# Patient Record
Sex: FEMALE | Race: WHITE | NOT HISPANIC OR LATINO | URBAN - METROPOLITAN AREA
[De-identification: names, ages, dates, MRNs, and addresses within clinical notes are randomized per-mention and may not be internally consistent; named-entity substitution may affect disease eponyms.]

---

## 2021-11-18 ENCOUNTER — INPATIENT (INPATIENT)
Facility: HOSPITAL | Age: 86
LOS: 3 days | Discharge: NURSING, REHAB OR LONG-TERM CARE | DRG: 603 | End: 2021-11-22
Attending: INTERNAL MEDICINE | Admitting: HOSPITALIST
Payer: MEDICARE

## 2021-11-18 VITALS
SYSTOLIC BLOOD PRESSURE: 109 MMHG | HEIGHT: 60 IN | WEIGHT: 89.95 LBS | TEMPERATURE: 98 F | DIASTOLIC BLOOD PRESSURE: 71 MMHG | OXYGEN SATURATION: 97 % | RESPIRATION RATE: 18 BRPM | HEART RATE: 91 BPM

## 2021-11-18 PROCEDURE — 99285 EMERGENCY DEPT VISIT HI MDM: CPT

## 2021-11-18 PROCEDURE — 72170 X-RAY EXAM OF PELVIS: CPT | Mod: 26

## 2021-11-18 PROCEDURE — 93010 ELECTROCARDIOGRAM REPORT: CPT

## 2021-11-18 PROCEDURE — 72100 X-RAY EXAM L-S SPINE 2/3 VWS: CPT | Mod: 26

## 2021-11-18 NOTE — ED PROVIDER NOTE - PHYSICAL EXAMINATION
Gen: NAD, thin lady  Neuro: AOx3  Head: atraumatic, normocephalic  Chest: CTAB, s1s2, RRR, no MRG. Outline of pacemaker present in upper L chest  Abdomen: soft, NTND, +BS  MSK: 4/5 strength in b/l legs, full ROM, able to move independently. Intact sensation.   Skin: no bruising noted

## 2021-11-18 NOTE — ED PROVIDER NOTE - PROGRESS NOTE DETAILS
I was called for evaluation for CDU for PT consult. I spoke with patient's emergency contact Katlyn Brumfiedl [phone]. She states that patient is alert and oriented to name and  and that she has "moderate dementia." States that sometimes that she can become confused.   Had a fall on Oct 21, 2021. Was hospitalized and sent to rehab, now residing in the [facility]. At that time states that patient hurt her R foot, for which she is now being treated with Doxycycline (per paperwork from rehab on day 2) for cellulitis.   I discussed conversation with ED team and reevaluated patient with team. Plan now for admission for PT evaluation and cellulitis. I then explained plan for admission for antibiotics and PT consult with Isiah- who is agreeable to plan.  - Amanda Carolina PA-C I was called for evaluation for CDU for PT consult. I spoke with patient's emergency contact Katlyn Brumfield [phone]. She states that patient is alert and oriented to name/ , and sometimes place and that she has "moderate dementia." States that sometimes that she can become confused.   Had a fall on Oct 21, 2021. Was hospitalized and sent to rehab, now residing in the [facility]. At that time states that patient hurt her R foot, for which she is now being treated with Doxycycline (per paperwork from rehab on day 2) for cellulitis.   I discussed conversation with ED team and reevaluated patient with team. Plan now for admission for PT evaluation and cellulitis. I then explained plan for admission for antibiotics and PT consult with Isiah- who is agreeable to plan.  - Amanda Carolina PA-C

## 2021-11-18 NOTE — ED PROVIDER NOTE - ATTENDING CONTRIBUTION TO CARE
89 y/o lady w/no known PMH presents from home after a fall this AM at [facility] onto her buttock from wheelchair, no head injury or loc, no signs of trauma or reproducible pain films to lspine and pelvis ordered, no head injury. pt also with r foot droop stating it is old to confirm with family, if so likely d.c back to nh with nl films. 89 y/o lady w/no known PMH presents from home after a fall this AM at nh onto her buttock from wheelchair, no head injury or loc, no signs of trauma or reproducible pain films to lspine and pelvis ordered, no head injury. pt also with r foot droop stating it is old to confirm with family. signed out pending work up.

## 2021-11-18 NOTE — ED PROVIDER NOTE - OBJECTIVE STATEMENT
89 y/o lady w/no known PMH presents from home after a fall this AM.     She was in her bathroom, trying to get from standing to her wheelchair/rocking chair, when she slipped and landed on her coccyx. She did not feel pain, was able to get back into her bed. She denies LOC or head strike during the fall. She came to the ED just to make sure "everything was ok". No loss of sensation/numbness/tingling in any of her extremities. No bowel or bladder incontinence.   She denies recent illness, fevers, chills, nausea/vomiting, chest pain, dyspnea, abdominal pain, headaches, diarrhea/constipation, dysuria.     12 point ROS otherwise negative.

## 2021-11-18 NOTE — ED PROVIDER NOTE - CLINICAL SUMMARY MEDICAL DECISION MAKING FREE TEXT BOX
89 y/o lady presents after a mechanical fall onto her coccyx. Will get XR pelvis and lumbar spine. She is not in pain, exam not concerning for cord compression. Will plan to DC if imaging is non-revealing. Case discussed w/Dr. Gomez. 89 y/o lady presents after a mechanical fall onto her coccyx. Will get XR pelvis and lumbar spine. She is not in pain, exam not concerning for cord compression. Will plan to DC if imaging is non-revealing. Case discussed w/Dr. Gomez.  Progress update 11/19 1253 hrs: Neuro evaluated patient and believe L foot drop is chronic, recommend admission for PT eval and possible AFO boot. Patient L foot shows open wound on dorsum (covered by dry bandage). Foot is warm. Obtained collateral from lizeth Brumfield, who stated that foot was injured back in October. Paperwork from [facility] shows Doxycycline since 11/17. Will escalate to Unasyn now given warmth and recent fall. Basic labs ordered, XR of L foot ordered. Will consult Podiatry and plan to admit to Medicine.

## 2021-11-19 DIAGNOSIS — F05 DELIRIUM DUE TO KNOWN PHYSIOLOGICAL CONDITION: ICD-10-CM

## 2021-11-19 DIAGNOSIS — L03.90 CELLULITIS, UNSPECIFIED: ICD-10-CM

## 2021-11-19 LAB
ALBUMIN SERPL ELPH-MCNC: 3.7 G/DL — SIGNIFICANT CHANGE UP (ref 3.3–5)
ALP SERPL-CCNC: 77 U/L — SIGNIFICANT CHANGE UP (ref 40–120)
ALT FLD-CCNC: 18 U/L — SIGNIFICANT CHANGE UP (ref 10–45)
ANION GAP SERPL CALC-SCNC: 13 MMOL/L — SIGNIFICANT CHANGE UP (ref 5–17)
AST SERPL-CCNC: 24 U/L — SIGNIFICANT CHANGE UP (ref 10–40)
BILIRUB SERPL-MCNC: 0.4 MG/DL — SIGNIFICANT CHANGE UP (ref 0.2–1.2)
BUN SERPL-MCNC: 16 MG/DL — SIGNIFICANT CHANGE UP (ref 7–23)
CALCIUM SERPL-MCNC: 9.1 MG/DL — SIGNIFICANT CHANGE UP (ref 8.4–10.5)
CHLORIDE SERPL-SCNC: 97 MMOL/L — SIGNIFICANT CHANGE UP (ref 96–108)
CO2 SERPL-SCNC: 23 MMOL/L — SIGNIFICANT CHANGE UP (ref 22–31)
CREAT SERPL-MCNC: 0.79 MG/DL — SIGNIFICANT CHANGE UP (ref 0.5–1.3)
CRP SERPL-MCNC: 46 MG/L — HIGH (ref 0–4)
ERYTHROCYTE [SEDIMENTATION RATE] IN BLOOD: 48 MM/HR — HIGH (ref 0–20)
GLUCOSE SERPL-MCNC: 93 MG/DL — SIGNIFICANT CHANGE UP (ref 70–99)
HCT VFR BLD CALC: 29.6 % — LOW (ref 34.5–45)
HGB BLD-MCNC: 9.6 G/DL — LOW (ref 11.5–15.5)
MCHC RBC-ENTMCNC: 27.4 PG — SIGNIFICANT CHANGE UP (ref 27–34)
MCHC RBC-ENTMCNC: 32.4 GM/DL — SIGNIFICANT CHANGE UP (ref 32–36)
MCV RBC AUTO: 84.3 FL — SIGNIFICANT CHANGE UP (ref 80–100)
NRBC # BLD: 0 /100 WBCS — SIGNIFICANT CHANGE UP (ref 0–0)
PLATELET # BLD AUTO: 326 K/UL — SIGNIFICANT CHANGE UP (ref 150–400)
POTASSIUM SERPL-MCNC: 4 MMOL/L — SIGNIFICANT CHANGE UP (ref 3.5–5.3)
POTASSIUM SERPL-SCNC: 4 MMOL/L — SIGNIFICANT CHANGE UP (ref 3.5–5.3)
PROT SERPL-MCNC: 6.6 G/DL — SIGNIFICANT CHANGE UP (ref 6–8.3)
RBC # BLD: 3.51 M/UL — LOW (ref 3.8–5.2)
RBC # FLD: 15.3 % — HIGH (ref 10.3–14.5)
SARS-COV-2 RNA SPEC QL NAA+PROBE: SIGNIFICANT CHANGE UP
SODIUM SERPL-SCNC: 133 MMOL/L — LOW (ref 135–145)
WBC # BLD: 8.4 K/UL — SIGNIFICANT CHANGE UP (ref 3.8–10.5)
WBC # FLD AUTO: 8.4 K/UL — SIGNIFICANT CHANGE UP (ref 3.8–10.5)

## 2021-11-19 PROCEDURE — 99223 1ST HOSP IP/OBS HIGH 75: CPT

## 2021-11-19 PROCEDURE — 99222 1ST HOSP IP/OBS MODERATE 55: CPT | Mod: GC

## 2021-11-19 PROCEDURE — 99222 1ST HOSP IP/OBS MODERATE 55: CPT

## 2021-11-19 PROCEDURE — 73630 X-RAY EXAM OF FOOT: CPT | Mod: 26,RT

## 2021-11-19 RX ORDER — MUPIROCIN 20 MG/G
1 OINTMENT TOPICAL
Refills: 0 | Status: DISCONTINUED | OUTPATIENT
Start: 2021-11-19 | End: 2021-11-22

## 2021-11-19 RX ORDER — BACITRACIN ZINC 500 UNIT/G
0 OINTMENT IN PACKET (EA) TOPICAL
Qty: 0 | Refills: 0 | DISCHARGE

## 2021-11-19 RX ORDER — AMPICILLIN SODIUM AND SULBACTAM SODIUM 250; 125 MG/ML; MG/ML
1.5 INJECTION, POWDER, FOR SUSPENSION INTRAMUSCULAR; INTRAVENOUS ONCE
Refills: 0 | Status: COMPLETED | OUTPATIENT
Start: 2021-11-19 | End: 2021-11-19

## 2021-11-19 RX ORDER — HALOPERIDOL DECANOATE 100 MG/ML
0.5 INJECTION INTRAMUSCULAR ONCE
Refills: 0 | Status: COMPLETED | OUTPATIENT
Start: 2021-11-19 | End: 2021-11-19

## 2021-11-19 RX ORDER — AMPICILLIN SODIUM AND SULBACTAM SODIUM 250; 125 MG/ML; MG/ML
1.5 INJECTION, POWDER, FOR SUSPENSION INTRAMUSCULAR; INTRAVENOUS EVERY 6 HOURS
Refills: 0 | Status: DISCONTINUED | OUTPATIENT
Start: 2021-11-19 | End: 2021-11-19

## 2021-11-19 RX ORDER — LATANOPROST 0.05 MG/ML
1 SOLUTION/ DROPS OPHTHALMIC; TOPICAL AT BEDTIME
Refills: 0 | Status: DISCONTINUED | OUTPATIENT
Start: 2021-11-19 | End: 2021-11-22

## 2021-11-19 RX ORDER — SOD,AMMONIUM,POTASSIUM LACTATE
0 CREAM (GRAM) TOPICAL
Qty: 0 | Refills: 0 | DISCHARGE

## 2021-11-19 RX ORDER — SODIUM CHLORIDE 9 MG/ML
1000 INJECTION INTRAMUSCULAR; INTRAVENOUS; SUBCUTANEOUS
Refills: 0 | Status: DISCONTINUED | OUTPATIENT
Start: 2021-11-19 | End: 2021-11-20

## 2021-11-19 RX ORDER — AMPICILLIN SODIUM AND SULBACTAM SODIUM 250; 125 MG/ML; MG/ML
INJECTION, POWDER, FOR SUSPENSION INTRAMUSCULAR; INTRAVENOUS
Refills: 0 | Status: DISCONTINUED | OUTPATIENT
Start: 2021-11-19 | End: 2021-11-19

## 2021-11-19 RX ORDER — HALOPERIDOL DECANOATE 100 MG/ML
0.5 INJECTION INTRAMUSCULAR ONCE
Refills: 0 | Status: DISCONTINUED | OUTPATIENT
Start: 2021-11-19 | End: 2021-11-19

## 2021-11-19 RX ORDER — CEFAZOLIN SODIUM 1 G
1000 VIAL (EA) INJECTION EVERY 8 HOURS
Refills: 0 | Status: DISCONTINUED | OUTPATIENT
Start: 2021-11-19 | End: 2021-11-19

## 2021-11-19 RX ORDER — ACETAMINOPHEN 500 MG
650 TABLET ORAL EVERY 6 HOURS
Refills: 0 | Status: DISCONTINUED | OUTPATIENT
Start: 2021-11-19 | End: 2021-11-21

## 2021-11-19 RX ORDER — LEVOTHYROXINE SODIUM 125 MCG
50 TABLET ORAL DAILY
Refills: 0 | Status: DISCONTINUED | OUTPATIENT
Start: 2021-11-19 | End: 2021-11-22

## 2021-11-19 RX ORDER — METOPROLOL TARTRATE 50 MG
25 TABLET ORAL DAILY
Refills: 0 | Status: DISCONTINUED | OUTPATIENT
Start: 2021-11-19 | End: 2021-11-22

## 2021-11-19 RX ORDER — COLLAGENASE CLOSTRIDIUM HIST. 250 UNIT/G
1 OINTMENT (GRAM) TOPICAL
Qty: 0 | Refills: 0 | DISCHARGE

## 2021-11-19 RX ORDER — HEPARIN SODIUM 5000 [USP'U]/ML
5000 INJECTION INTRAVENOUS; SUBCUTANEOUS EVERY 12 HOURS
Refills: 0 | Status: DISCONTINUED | OUTPATIENT
Start: 2021-11-19 | End: 2021-11-22

## 2021-11-19 RX ORDER — LANOLIN ALCOHOL/MO/W.PET/CERES
3 CREAM (GRAM) TOPICAL AT BEDTIME
Refills: 0 | Status: DISCONTINUED | OUTPATIENT
Start: 2021-11-19 | End: 2021-11-22

## 2021-11-19 RX ORDER — CEFAZOLIN SODIUM 1 G
1000 VIAL (EA) INJECTION EVERY 12 HOURS
Refills: 0 | Status: DISCONTINUED | OUTPATIENT
Start: 2021-11-19 | End: 2021-11-20

## 2021-11-19 RX ORDER — FAMOTIDINE 10 MG/ML
1 INJECTION INTRAVENOUS
Qty: 0 | Refills: 0 | DISCHARGE

## 2021-11-19 RX ADMIN — HEPARIN SODIUM 5000 UNIT(S): 5000 INJECTION INTRAVENOUS; SUBCUTANEOUS at 18:59

## 2021-11-19 RX ADMIN — SODIUM CHLORIDE 50 MILLILITER(S): 9 INJECTION INTRAMUSCULAR; INTRAVENOUS; SUBCUTANEOUS at 15:15

## 2021-11-19 RX ADMIN — HALOPERIDOL DECANOATE 0.5 MILLIGRAM(S): 100 INJECTION INTRAMUSCULAR at 09:36

## 2021-11-19 RX ADMIN — Medication 100 MILLIGRAM(S): at 19:00

## 2021-11-19 RX ADMIN — HALOPERIDOL DECANOATE 0.5 MILLIGRAM(S): 100 INJECTION INTRAMUSCULAR at 08:23

## 2021-11-19 RX ADMIN — LATANOPROST 1 DROP(S): 0.05 SOLUTION/ DROPS OPHTHALMIC; TOPICAL at 23:49

## 2021-11-19 RX ADMIN — AMPICILLIN SODIUM AND SULBACTAM SODIUM 100 GRAM(S): 250; 125 INJECTION, POWDER, FOR SUSPENSION INTRAMUSCULAR; INTRAVENOUS at 07:29

## 2021-11-19 NOTE — BEHAVIORAL HEALTH ASSESSMENT NOTE - OTHER
Unable to assess as above. Niece is a protective factor [facility] unable to assess. acute deterioration in function

## 2021-11-19 NOTE — ED ADULT NURSE NOTE - IS THE PATIENT ABLE TO BE SCREENED?
No If you are a smoker, it is important for your health to stop smoking. Please be aware that second hand smoke is also harmful.

## 2021-11-19 NOTE — DISCHARGE NOTE PROVIDER - CARE PROVIDER_API CALL
Wan Belcher)  Psychiatry  [address] [place] [address]  Phone: [phone]  Fax: [phone]  Established Patient  Follow Up Time:

## 2021-11-19 NOTE — BEHAVIORAL HEALTH ASSESSMENT NOTE - CASE SUMMARY
89F , living in [facility], with no formal PPHx, no h/o SA or psych admissions, no active substance abuse, with PMH significant for h/o PPM a/w fall, psychiatry consulted for management of agitation.  Pt presently calm, resting in stretcher, states we are "in the [place], it's so keren," gives her full name, disoriented to date/situation.  Denies SIIP/HIIP, denies AVH or paranoia.  Denies depressed mood or anxiety.  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  Recs: Haldol PRN as above provided QT<500, agree with fellow's assessment and plan as above.  CL psych will f/u.

## 2021-11-19 NOTE — H&P ADULT - NSHPPHYSICALEXAM_GEN_ALL_CORE
PHYSICAL EXAMINATION:  Vital Signs Last 24 Hrs  T(C): 36.7 (18 Nov 2021 20:33), Max: 36.7 (18 Nov 2021 20:33)  T(F): 98.1 (18 Nov 2021 20:33), Max: 98.1 (18 Nov 2021 20:33)  HR: 93 (19 Nov 2021 05:34) (91 - 93)  BP: 181/86 (19 Nov 2021 05:34) (109/71 - 181/86)  BP(mean): --  RR: 18 (19 Nov 2021 05:34) (18 - 18)  SpO2: 99% (19 Nov 2021 05:34) (97% - 99%)  CAPILLARY BLOOD GLUCOSE            GENERAL: NAD, well-groomed,  HEAD:  atraumatic, normocephalic  EYES: sclera anicteric  ENMT: mucous membranes moist  NECK: supple, No JVD  CHEST/LUNG: clear to auscultation bilaterally;    no      rales   ,   no rhonchi,   HEART: normal S1, S2  ABDOMEN: BS+, soft, ND, NT   EXTREMITIES:    no    edema    b/l LEs  NEURO: awake, ,     moves all extremities  right foot  drop, with mild  redness  SKIN: no     rash

## 2021-11-19 NOTE — BEHAVIORAL HEALTH ASSESSMENT NOTE - NSBHCONSULTRECOMMENDOTHER_PSY_A_CORE FT
Haldol 0.25 mg PO/IV/IM q 6hrs PRN severe agitation, Please monitor QTC < 500, patient may need a corrected Qtc measurement as she has a pacemaker (as reported by her HCP); cardiology input appreciated. Psychiatry will continue to monitor and follow.

## 2021-11-19 NOTE — H&P ADULT - ASSESSMENT
89 yo RH female     with no known PMHx      presents to the ED after a fall.     Per ED team, patient was in her bathroom, trying to sit down when she slipped and fell onto her coccyx. She was able to get off the floor and back into her bed. Denied coccyx pain, LOC, head strike. Patient came to the ED to "make sure everything was okay."    XR lumbar spine AP and lateral negative for acute fracture.   Neurology consulted for R foot drop. 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 Chronic, per patient.     * s/p fall, suspect from foor  drop  xray pelvis  and foot, no fx   no  syncope   pe r pt, foot  drop is  c/c, hence  per  neuro, no w/p needed   PT eval  *   redness  /  shallow ulcer, right foot, seen by podiatry  on iv ancef   on dvt ppx       ra       89 yo RH female     with no known PMHx      presents to the ED after a fall.     Per ED team, patient was in her bathroom, trying to sit down when she slipped and fell onto her coccyx. She was able to get off the floor and back into her bed. Denied coccyx pain, LOC, head strike. Patient came to the ED to "make sure everything was okay."    XR lumbar spine AP and lateral negative for acute fracture.   Neurology consulted for R foot drop. 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 Chronic, per patient.     * s/p fall, suspect from foot   drop  per  hcp, pt  has  had  mlple falls.  and foot ha s been  swollen since last fall 10/21/ 21, at  home  xray pelvis  and foot, no fx   no  syncope   per  pt, foot  drop is  c/c, hence  per  neuro, no w/p needed   PT eval  *   redness  /  shallow ulcer, right foot, seen by podiatry  on iv ancef   on dvt ppx     GOC   hcp Benjamin stanley  . pt is  a full code/  resides  at [facility]       89 yo RH female     with no known PMHx      presents to the ED after a fall.  h/o mlple  falls, dementia  with constatnt agitation pe r hcp     Per ED team, patient was in her bathroom, trying to sit down when she slipped and fell onto her coccyx. She was able to get off the floor and back into her bed. Denied coccyx pain, LOC, head strike. Patient came to the ED to "make sure everything was okay."    XR lumbar spine AP and lateral negative for acute fracture.   Neurology consulted for R foot drop. 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 Chronic, per patient.     * s/p fall, suspect from foot   drop  per  hcp, pt  has  had  mlple falls.  and foot ha s been  swollen since last fall 10/21/ 21, at  home  xray pelvis  and foot, no fx   no  syncope   per  pt, foot  drop is  c/c, hence  per  neuro, no w/p needed   PT eval  *   redness  /  shallow ulcer, right foot, seen by podiatry  on iv ancef  *  Dementia  with constant agitation per neice/ and is requesting psych eval   on dvt ppx     GOC   hcp Katlyn. niece  pt has no children  . pt is  a full code    resides  at [facility]       89 yo RH female     with no known PMHx      presents to the ED after a fall.  h/o mlple  falls, dementia  with constatnt agitation pe r hcp     Per ED team, patient was in her bathroom, trying to sit down when she slipped and fell onto her coccyx. She was able to get off the floor and back into her bed. Denied coccyx pain, LOC, head strike. Patient came to the ED to "make sure everything was okay."    XR lumbar spine AP and lateral negative for acute fracture.   Neurology consulted for R foot drop. 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 Chronic, per patient.     * s/p fall, suspect from foot   drop  per  hcp, pt  has  had  mlple falls.  and foot ha s been  swollen since last fall 10/21/ 21, at  home  xray pelvis  and foot, no fx   no  syncope   per  pt, foot  drop is  c/c, hence  per  neuro, no w/p needed   PT eval  *   had   redness per  podiatry, , right foot, seen by podiatry   minimal cellulitis, on iv ancef  for today per iD, switch to keflex  in am  *  Dementia  with constant agitation per neice/ and is requesting psych eval   on dvt ppx     GOC   hcp Katlyn. niece  pt has no children  . pt is  a full code    resides  at [facility]

## 2021-11-19 NOTE — BEHAVIORAL HEALTH ASSESSMENT NOTE - NSBHCONSULTMEDSEVERE_PSY_A_CORE FT
Haldol 0.25 mg PO/IV/IM q 6hrs PRN severe agitation, Please monitor QTC < 500, patient may need a corrected Qtc measurement as she has a pacemaker (as reported by her HCP) Haldol 0.25mg-0.5mg PO/IV/IM q 6hrs PRN severe agitation, Please monitor QTC < 500, patient may need a corrected Qtc measurement as she has a pacemaker (as reported by her HCP)

## 2021-11-19 NOTE — BEHAVIORAL HEALTH ASSESSMENT NOTE - NSBHCHARTREVIEWVS_PSY_A_CORE FT
ICU Vital Signs Last 24 Hrs  T(C): 36.4 (19 Nov 2021 10:21), Max: 36.7 (18 Nov 2021 20:33)  T(F): 97.6 (19 Nov 2021 10:21), Max: 98.1 (18 Nov 2021 20:33)  HR: 90 (19 Nov 2021 10:21) (90 - 93)  BP: 155/82 (19 Nov 2021 10:21) (109/71 - 181/86)  BP(mean): --  ABP: --  ABP(mean): --  RR: 19 (19 Nov 2021 10:21) (18 - 19)  SpO2: 99% (19 Nov 2021 10:21) (97% - 99%)

## 2021-11-19 NOTE — DISCHARGE NOTE PROVIDER - NSDCMRMEDTOKEN_GEN_ALL_CORE_FT
acetaminophen 500 mg oral tablet: 2 tab(s) orally 3 times a day, As Needed  Daily Melodie oral tablet: 1 tab(s) orally once a day  folic acid 1 mg oral tablet: 1 tab(s) orally once a day  levothyroxine 50 mcg (0.05 mg) oral tablet: 1 tab(s) orally once a day  multivitamin with minerals: 1 tab(s) orally once a day  travoprost 0.004% ophthalmic solution: 1 drop(s) to each affected eye once a day (in the evening)  Vitamin B1 100 mg oral tablet: 1 tab(s) orally once a day   acetaminophen 500 mg oral tablet: 2 tab(s) orally 3 times a day, As Needed  AFO  brace for the Right foot drop:   Daily Melodie oral tablet: 1 tab(s) orally once a day  folic acid 1 mg oral tablet: 1 tab(s) orally once a day  levothyroxine 50 mcg (0.05 mg) oral tablet: 1 tab(s) orally once a day  multivitamin with minerals: 1 tab(s) orally once a day  travoprost 0.004% ophthalmic solution: 1 drop(s) to each affected eye once a day (in the evening)  Vitamin B1 100 mg oral tablet: 1 tab(s) orally once a day   acetaminophen 500 mg oral tablet: 2 tab(s) orally 3 times a day, As Needed  AFO  brace for the Right foot drop:   Daily Melodie oral tablet: 1 tab(s) orally once a day  folic acid 1 mg oral tablet: 1 tab(s) orally once a day  haloperidol 0.5 mg oral tablet: 1 tab(s) orally once a day (at bedtime)  levothyroxine 50 mcg (0.05 mg) oral tablet: 1 tab(s) orally once a day  losartan 25 mg oral tablet: 1 tab(s) orally once a day  melatonin 3 mg oral tablet: 1 tab(s) orally once a day (at bedtime), As needed, Insomnia  metoprolol succinate 25 mg oral tablet, extended release: 1 tab(s) orally once a day  mupirocin 2% topical ointment: 1 application topically 2 times a day  Physical Therapy:   travoprost 0.004% ophthalmic solution: 1 drop(s) to each affected eye once a day (in the evening)  Vitamin B1 100 mg oral tablet: 1 tab(s) orally once a day

## 2021-11-19 NOTE — H&P ADULT - HISTORY OF PRESENT ILLNESS
HPI: 89 yo RH female     with no known PMHx     presents to the ED after a fall.     Per ED team, patient was in her bathroom, trying to sit down when she slipped and fell onto her coccyx. She was able to get off the floor and back into her bed. Denied coccyx pain, LOC, head strike.    Patient came to the ED to "make sure everything was okay."     XR lumbar spine AP and lateral negative for acute fracture.     Neurology consulted for R foot drop. At the time of neurology assessment, patient was woken from sleep in the early AM and was confused. She did not remember that she was in the hospital and asked repeatedly why she came here and if everything was okay.     Regarding the R foot drop, patient states this is a chronic issue that she has had for "years." States she never saw a doctor about it because she has been able to "deal with it" by being very careful when she walks.     She states she has a walker at home but rarely uses it, typically ambulating without assistive device.     She also notes the foot drop is associated with chronic RLE calf and shin "muscle pain."   Denies HA, dizziness, vision changes, numbness/tingling, new weakness.

## 2021-11-19 NOTE — BEHAVIORAL HEALTH ASSESSMENT NOTE - RISK ASSESSMENT
Low Acute Suicide Risk Risk factors: acute medical condition, decline in rational thinking ability   Protective factors: no current SIIP/HIIP, no h/o SA/SIB, no h/o psych admissions, social supports

## 2021-11-19 NOTE — BEHAVIORAL HEALTH ASSESSMENT NOTE - SUMMARY
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 Patient is AAOx 0 currently and notably a poor historian and continues to report she is cold and to talk to "son Katlyn." Patient vaguely recalls she had a fall but is uncooperative for the remainder of the assessment. Unable to assess if presence of hypomania/sully, PTSD, psychosis or OCD symptoms. Denies any SI/HI/AVH currently. 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 Patient is AAOx 0 currently and notably a poor historian and continues to report she is cold and to talk to "son Katlyn." Patient vaguely recalls she had a fall but is uncooperative for the remainder of the assessment. Denies any SI/HI/AVH currently.

## 2021-11-19 NOTE — ED ADULT NURSE REASSESSMENT NOTE - NS ED NURSE REASSESS COMMENT FT1
Pt. attempting to get out of bed and verbally aggressive with staff. Pt. placed on 1:1 for risk for elopement. Received report from Maurilio KIM. Pt. angry and attempting to get out of bed. Verbally aggressive with staff. Pt. placed on 1:1 for risk for elopement.

## 2021-11-19 NOTE — CONSULT NOTE ADULT - SUBJECTIVE AND OBJECTIVE BOX
Patient is a 89y old  Female who presents with a chief complaint of   From HPI" HPI:    HPI: 89 yo RH female     with no known PMHx     presents to the ED after a fall.     Per ED team, patient was in her bathroom, trying to sit down when she slipped and fell onto her coccyx. She was able to get off the floor and back into her bed. Denied coccyx pain, LOC, head strike.    Patient came to the ED to "make sure everything was okay."     XR lumbar spine AP and lateral negative for acute fracture.     Neurology consulted for R foot drop. At the time of neurology assessment, patient was woken from sleep in the early AM and was confused. She did not remember that she was in the hospital and asked repeatedly why she came here and if everything was okay.     Regarding the R foot drop, patient states this is a chronic issue that she has had for "years." States she never saw a doctor about it because she has been able to "deal with it" by being very careful when she walks.     She states she has a walker at home but rarely uses it, typically ambulating without assistive device.     She also notes the foot drop is associated with chronic RLE calf and shin "muscle pain."   Denies HA, dizziness, vision changes, numbness/tingling, new weakness.     (19 Nov 2021 09:21)  "    Above verified-agree with above unless noted below.    ID consulted     PAST MEDICAL & SURGICAL HISTORY:  No pertinent past medical history        Social history:   denies   Smoking,    ETOH,      IVDU       FAMILY HISTORY:  - Family history of medical problems in all first degree relatives reviewed.None of these were found to be related to patients current illness.    REVIEW OF SYSTEMS  pt not reliable historian  denies any complaints     General:	Denies any malaise fatigue or chills. Fevers absent    Skin:No rash  	  Ophthalmologic:Denies any visual complaints,discharge redness or photophobia  	  ENMT:No nasal discharge,headache,sinus congestion or throat pain.No dental complaints    Respiratory and Thorax:No cough,sputum or chest pain.Denies shortness of breath  	  Cardiovascular:	No chest pain,palpitaions or dizziness    Gastrointestinal:	NO nausea,abdominal pain or diarrhea.    Genitourinary:	No dysuria,frequency. No flank pain    Musculoskeletal:	No joint swelling or pain.No weakness    Neurological:No confusion,diziness.No extremity weakness.No bladder or bowel incontinence	      Allergic/Immunologic:	No hives or rash   Allergies    No Known Allergies    Intolerances        Antimicrobials:    ceFAZolin   IVPB 1000 milliGRAM(s) IV Intermittent every 12 hours      Prior Antimicrobials:  MEDICATIONS  (STANDING):    ampicillin/sulbactam  IVPB   100 mL/Hr IV Intermittent (11-19-21 @ 07:29)      Other medications reviewed      Vital Signs Last 24 Hrs  T(C): 36.4 (19 Nov 2021 10:21), Max: 36.7 (18 Nov 2021 20:33)  T(F): 97.6 (19 Nov 2021 10:21), Max: 98.1 (18 Nov 2021 20:33)  HR: 90 (19 Nov 2021 10:21) (90 - 93)  BP: 155/82 (19 Nov 2021 10:21) (109/71 - 181/86)  BP(mean): --  RR: 19 (19 Nov 2021 10:21) (18 - 19)  SpO2: 99% (19 Nov 2021 10:21) (97% - 99%)    PHYSICAL EXAM:Pleasant patient in no acute distress.      Constitutional:Comfortable.Awake and alert  No cachexia     Eyes:PERRL EOMI.NO discharge or conjunctival injection    ENMT:No sinus tenderness.No thrush.No pharyngeal exudate or erythema.Fair dental hygiene    Neck:Supple,No LN,no JVD      Respiratory:Good air entry bilaterally,CTA    Cardiovascular:S1 S2 wnl, ESM no rub or gallops    Gastrointestinal:Soft BS(+) no tenderness no masses ,No rebound or guarding    Genitourinary:No CVA tendereness         Extremities:R foot dorsal surface small ulcer- no discharge malodor or purulence  No surrounding erythema - minimals welling     Vascular:peripheral pulses felt    Neurological:Alert awake but confused         Musculoskeletal:No joint swelling or LOM              Labs:                            9.6    8.40  )-----------( 326      ( 19 Nov 2021 05:46 )             29.6     WBC Count: 8.40 (11-19-21 @ 05:46)      11-19    133<L>  |  97  |  16  ----------------------------<  93  4.0   |  23  |  0.79    Ca    9.1      19 Nov 2021 05:46    TPro  6.6  /  Alb  3.7  /  TBili  0.4  /  DBili  x   /  AST  24  /  ALT  18  /  AlkPhos  77  11-19        < from: Xray Foot AP + Lateral + Oblique, Right (11.19.21 @ 05:24) >    IMPRESSION:    Diffuse osteopenia. No acute fracture or dislocation. No cortical erosions or gas tracking.    MRI would provide a more sensitive evaluation, if clinically warranted.    --- End of Report ---            < end of copied text >            Imaging studies(films) were independently reviewed.Findings as detailed in report above   
Podiatry pager #: [phone]/ [phone]    Patient is a 89y old  Female who presents with a chief complaint of RF erythema    HPI:    She was in her bathroom, trying to get from standing to her wheelchair/rocking chair, when she slipped and landed on her coccyx. She did not feel pain, was able to get back into her bed. She denies LOC or head strike during the fall. She came to the ED just to make sure "everything was ok". No loss of sensation/numbness/tingling in any of her extremities. No bowel or bladder incontinence.   	She denies recent illness, fevers, chills, nausea/vomiting, chest pain, dyspnea, abdominal pain, headaches, diarrhea/constipation, dysuria.     	12 point ROS otherwise negative.  PAST MEDICAL & SURGICAL HISTORY:      MEDICATIONS  (STANDING):  ampicillin/sulbactam  IVPB      ampicillin/sulbactam  IVPB 1.5 Gram(s) IV Intermittent once  ampicillin/sulbactam  IVPB 1.5 Gram(s) IV Intermittent every 6 hours    MEDICATIONS  (PRN):      Allergies    No Known Allergies    Intolerances        VITALS:    Vital Signs Last 24 Hrs  T(C): 36.7 (18 Nov 2021 20:33), Max: 36.7 (18 Nov 2021 20:33)  T(F): 98.1 (18 Nov 2021 20:33), Max: 98.1 (18 Nov 2021 20:33)  HR: 93 (19 Nov 2021 05:34) (91 - 93)  BP: 181/86 (19 Nov 2021 05:34) (109/71 - 181/86)  BP(mean): --  RR: 18 (19 Nov 2021 05:34) (18 - 18)  SpO2: 99% (19 Nov 2021 05:34) (97% - 99%)    LABS:                          9.6    8.40  )-----------( 326      ( 19 Nov 2021 05:46 )             29.6       11-19    133<L>  |  97  |  16  ----------------------------<  93  4.0   |  23  |  0.79    Ca    9.1      19 Nov 2021 05:46    TPro  6.6  /  Alb  3.7  /  TBili  0.4  /  DBili  x   /  AST  24  /  ALT  18  /  AlkPhos  77  11-19      CAPILLARY BLOOD GLUCOSE              LOWER EXTREMITY PHYSICAL EXAM:    Vasular: DP/PT 1/4, B/L, CFT <3 seconds B/L, Temperature gradient WNL, B/L.   Neuro: Epicritic sensation diminished to the level of digits, B/L.  Musculoskeletal/Ortho: unremarkable    Skin: Right foot erythema to the ankle, wound to subQ on dorsum of the foot w/ fibrotic wound base, does not probe beyond subQ, no malodor, no drainage      RADIOLOGY & ADDITIONAL STUDIES:    
CHIEF COMPLAINT:Patient is a 89y old  Female who presents with a chief complaint of     HPI:  89 yo RH female with no known PMHxpresents to the ED after a fall.     Per ED team, patient was in her bathroom, trying to sit down when she slipped and fell onto her coccyx. She was able to get off the floor and back into her bed. Denied coccyx pain, LOC, head strike.    Patient came to the ED to "make sure everything was okay."     XR lumbar spine AP and lateral negative for acute fracture.     Neurology consulted for R foot drop. At the time of neurology assessment, patient was woken from sleep in the early AM and was confused. She did not remember that she was in the hospital and asked repeatedly why she came here and if everything was okay.     Regarding the R foot drop, patient states this is a chronic issue that she has had for "years." States she never saw a doctor about it because she has been able to "deal with it" by being very careful when she walks.     She states she has a walker at home but rarely uses it, typically ambulating without assistive device.     She also notes the foot drop is associated with chronic RLE calf and shin "muscle pain."   Denies HA, dizziness, vision changes, numbness/tingling, new weakness.        PAST MEDICAL & SURGICAL HISTORY:  No pertinent past medical history        MEDICATIONS  (STANDING):  ceFAZolin   IVPB 1000 milliGRAM(s) IV Intermittent every 12 hours  heparin   Injectable 5000 Unit(s) SubCutaneous every 12 hours  latanoprost 0.005% Ophthalmic Solution 1 Drop(s) Both EYES at bedtime  levothyroxine 50 MICROGram(s) Oral daily  mupirocin 2% Ointment 1 Application(s) Topical two times a day  sodium chloride 0.9%. 1000 milliLiter(s) (50 mL/Hr) IV Continuous <Continuous>    MEDICATIONS  (PRN):      FAMILY HISTORY:      SOCIAL HISTORY:    [x] Non-smoker  [ ] Smoker  [ ] Alcohol    Allergies    No Known Allergies    Intolerances    	    REVIEW OF SYSTEMS:  CONSTITUTIONAL: No fever, weight loss, or fatigue  EYES: No eye pain, visual disturbances, or discharge  ENT:  No difficulty hearing, tinnitus, vertigo; No sinus or throat pain  NECK: No pain or stiffness  RESPIRATORY: No cough, wheezing, chills or hemoptysis; No Shortness of Breath  CARDIOVASCULAR: No chest pain, palpitations, passing out, dizziness, or leg swelling  GASTROINTESTINAL: No abdominal or epigastric pain. No nausea, vomiting, or hematemesis; No diarrhea or constipation. No melena or hematochezia.  GENITOURINARY: No dysuria, frequency, hematuria, or incontinence  NEUROLOGICAL: No headaches, memory loss, loss of strength, numbness, or tremors  SKIN: No itching, burning, rashes, or lesions   LYMPH Nodes: No enlarged glands  ENDOCRINE: No heat or cold intolerance; No hair loss  MUSCULOSKELETAL: No joint pain or swelling; No muscle, back, or extremity pain  PSYCHIATRIC: No depression, anxiety, mood swings, or difficulty sleeping  HEME/LYMPH: No easy bruising, or bleeding gums  ALLERGY AND IMMUNOLOGIC: No hives or eczema	    [ ] All others negative	  [x ] Unable to obtain    PHYSICAL EXAM:  T(C): 36.9 (11-19-21 @ 16:29), Max: 36.9 (11-19-21 @ 16:29)  HR: 81 (11-19-21 @ 16:29) (81 - 93)  BP: 170/89 (11-19-21 @ 16:29) (109/71 - 181/86)  RR: 17 (11-19-21 @ 16:29) (17 - 19)  SpO2: 97% (11-19-21 @ 16:29) (97% - 99%)  Wt(kg): --  I&O's Summary      Appearance: Normal	  HEENT:   Normal oral mucosa, PERRL, EOMI	  Lymphatic: No lymphadenopathy  Cardiovascular: Normal S1 S2, No JVD, + murmurs, No edema  Respiratory: rghonchi  Gastrointestinal:  Soft, Non-tender, + BS	  Skin: No rashes, No ecchymoses, No cyanosis	  Neurologic: Non-focal  Extremities: Normal range of motion, No clubbing, cyanosis or edema  Vascular: decrease pulses    TELEMETRY: 	    ECG:  	  RADIOLOGY:  OTHER: 	  	  LABS:	 	    CARDIAC MARKERS:                              9.6    8.40  )-----------( 326      ( 19 Nov 2021 05:46 )             29.6     11-19    133<L>  |  97  |  16  ----------------------------<  93  4.0   |  23  |  0.79    Ca    9.1      19 Nov 2021 05:46    TPro  6.6  /  Alb  3.7  /  TBili  0.4  /  DBili  x   /  AST  24  /  ALT  18  /  AlkPhos  77  11-19    proBNP:   Lipid Profile:   HgA1c:   TSH:       PREVIOUS DIAGNOSTIC TESTING:      < from: Xray Foot AP + Lateral + Oblique, Right (11.19.21 @ 05:24) >  Diffuse osteopenia. No acute fracture or dislocation. No cortical erosions or gas tracking.    MRI would provide a more sensitive evaluation, if clinically warranted.    Pt is 89yo who presents w/ RF cellulitis and wound to subQ  -pt seen and examined  -Afebrile, no leukocytosis  -Right foot erythema to the ankle, wound to subQ on dorsum of the foot w/ fibrotic wound base, does not probe beyond subQ, no malodor, no drainage  -wound culture not obtained as it would yield contaminant  -Recommend IV antibiotics  -Pod plan for local wound care      
JUSTINE OROURKE  Female  MRN-[id number]    HPI: 89 yo RH female with no known PMHx presents to the ED after a fall. Per ED team, patient was in her bathroom, trying to sit down when she slipped and fell onto her coccyx. She was able to get off the floor and back into her bed. Denied coccyx pain, LOC, head strike. Patient came to the ED to "make sure everything was okay." XR lumbar spine AP and lateral negative for acute fracture. Neurology consulted for R foot drop. At the time of neurology assessment, patient was woken from sleep in the early AM and was confused. She did not remember that she was in the hospital and asked repeatedly why she came here and if everything was okay. Regarding the R foot drop, patient states this is a chronic issue that she has had for "years." States she never saw a doctor about it because she has been able to "deal with it" by being very careful when she walks. She states she has a walker at home but rarely uses it, typically ambulating without assistive device. She also notes the foot drop is associated with chronic RLE calf and shin "muscle pain." Denies HA, dizziness, vision changes, numbness/tingling, new weakness.    ROS: All negative except as mentioned in HPI.    PAST MEDICAL & SURGICAL HISTORY:  No known Past Medical History    Allergies    No Known Allergies    Vital Signs Last 24 Hrs  T(C): 36.7 (18 Nov 2021 20:33), Max: 36.7 (18 Nov 2021 20:33)  T(F): 98.1 (18 Nov 2021 20:33), Max: 98.1 (18 Nov 2021 20:33)  HR: 91 (18 Nov 2021 20:33) (91 - 91)  BP: 109/71 (18 Nov 2021 20:33) (109/71 - 109/71)  RR: 18 (18 Nov 2021 20:33) (18 - 18)  SpO2: 97% (18 Nov 2021 20:33) (97% - 97%)    General Exam:  Constitutional: Lying on stretcher, NAD.  Head: Normocephalic, atraumatic.  Extremities: No edema.    Neuro Exam:   MS: Sleeping. Awakens to voice. Oriented to self, age, month, and year. Unable to state location. Speech is fluent, not slurred. Able to name objects and repeat. Follows commands.  CN: PERRL. VFF. EOMI. V1-V3 intact. Face symmetric. Hearing decreased bilaterally to normal conversation. Tongue midline.   Motor:            Deltoid	Biceps	Triceps	   	R	5	5	5	5	 	  	L	5	5	5	5	    		H-Flex	K-Flex	K-Ext	Ankle In   Ankle Ev   D-Flex   P-Flex  	R	5	5	5	5	  3	      0	      4	 	   	L	5	5	5	5	  5	      5	      5	    Sensory: Intact to LT throughout. No extinction.  Reflexes: 2+ throughout bilateral upper extremities. 2+ bilateral Patellars, 1+ bilateral Achilles. Babinski absent bilaterally.   Coordination: No dysmetria on FNF or on HTS bilaterally.  Gait: Patient did not want to try to walk at time of exam.    RADIOLOGY:    -11/18 XR Lumbar Spine AP + Lateral (Preliminary Read): No acute fracture.

## 2021-11-19 NOTE — BEHAVIORAL HEALTH ASSESSMENT NOTE - NSBHCHARTREVIEWIMAGING_PSY_A_CORE FT
INTERPRETATION:  CLINICAL INFORMATION: Cellulitis. Evaluate for osteomyelitis and/or fracture.    TECHNIQUE: 3-views of the right foot    COMPARISON: None    FINDINGS: There is no acute fracture or dislocation. There are scattered mild midfoot degenerative changes. The soft tissues are unremarkable. No cortical erosions or gas tracking. There is diffuse osteopenia. Vascular atherosclerotic calcifications are present.    IMPRESSION:    Diffuse osteopenia. No acute fracture or dislocation. No cortical erosions or gas tracking.    MRI would provide a more sensitive evaluation, if clinically warranted.

## 2021-11-19 NOTE — DISCHARGE NOTE PROVIDER - NSDCFUADDINST_GEN_ALL_CORE_FT
Podiatry Discharge Instructions:  Follow up: Please follow up with Dr. Villegas within 1 week of discharge from the hospital, please call [phone] for appointment and discuss that you recently were seen in the hospital.  Wound Care: Please apply mupirocin to RF wound daily followed by 4x4 guaze and juvencio.   Weight bearing: Please weight bear as tolerated   Antibiotics: Please continue as instructed.

## 2021-11-19 NOTE — BEHAVIORAL HEALTH ASSESSMENT NOTE - NSBHCHARTREVIEWLAB_PSY_A_CORE FT
9.6    8.40  )-----------( 326      ( 19 Nov 2021 05:46 )             29.6     11-19    133<L>  |  97  |  16  ----------------------------<  93  4.0   |  23  |  0.79    Ca    9.1      19 Nov 2021 05:46    TPro  6.6  /  Alb  3.7  /  TBili  0.4  /  DBili  x   /  AST  24  /  ALT  18  /  AlkPhos  77  11-19

## 2021-11-19 NOTE — H&P ADULT - NSHPLABSRESULTS_GEN_ALL_CORE
LABS:                        9.6    8.40  )-----------( 326      ( 19 Nov 2021 05:46 )             29.6     11-19    133<L>  |  97  |  16  ----------------------------<  93  4.0   |  23  |  0.79    Ca    9.1      19 Nov 2021 05:46    TPro  6.6  /  Alb  3.7  /  TBili  0.4  /  DBili  x   /  AST  24  /  ALT  18  /  AlkPhos  77  11-19

## 2021-11-19 NOTE — DISCHARGE NOTE PROVIDER - NSDCCPCAREPLAN_GEN_ALL_CORE_FT
PRINCIPAL DISCHARGE DIAGNOSIS  Diagnosis: Cellulitis  Assessment and Plan of Treatment: Right Foot ulcer  Seen by Infectious disease and Podiatry  Complete course or oral antibiotic therapy  Continue with local wound care to foot ulcer:   Aply mupirocin to right foot wound daily, and dress with sterile 4x4 gauze and juvencio.      SECONDARY DISCHARGE DIAGNOSES  Diagnosis: Delirium due to another medical condition  Assessment and Plan of Treatment: Supportive care    Diagnosis: Right foot drop  Assessment and Plan of Treatment: Received right AFO to control the drop-foot condition.  A Darco shoe was incorporated with the brace ,and 2 socks.  Physical therapy for increase mobility and strengthening     PRINCIPAL DISCHARGE DIAGNOSIS  Diagnosis: Cellulitis  Assessment and Plan of Treatment: Right Foot ulcer  Seen by Infectious disease and Podiatry  Completed course or oral antibiotic therapy  Continue with local wound care to foot ulcer:   Aply mupirocin to right foot wound daily, and dress with sterile 4x4 gauze and juvencio.      SECONDARY DISCHARGE DIAGNOSES  Diagnosis: Delirium due to another medical condition  Assessment and Plan of Treatment: Supportive care    Diagnosis: Right foot drop  Assessment and Plan of Treatment: Received right AFO to control the drop-foot condition.  A Darco shoe was incorporated with the brace ,and 2 socks.  Physical therapy for increase mobility and strengthening

## 2021-11-19 NOTE — BEHAVIORAL HEALTH ASSESSMENT NOTE - HPI (INCLUDE ILLNESS QUALITY, SEVERITY, DURATION, TIMING, CONTEXT, MODIFYING FACTORS, ASSOCIATED SIGNS AND SYMPTOMS)
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 Patient is AAOx 0 currently and notably a poor historian and continues to report she is cold and to talk to "son Katlyn." Patient vaguely recalls she had a fall but is uncooperative for the remainder of the assessment. Unable to assess if presence of hypomania/sully, PTSD, psychosis or OCD symptoms. Denies any SI/HI/AVH currently.    Spoke with Patient's Niece ( Brother's daughter) Katlyn who identifies herself as HCP:    Per niece, patient was fairly high functioning prior to the last three years when she started to experience some cognitive decline. Patient has been noncompliant with medication such as synthroid and also has had gait imbalance. Patient had one additional fall in October 2021 and was hospitalized in [place]. She was admitted to [facility] assisted living a couple weeks ago but has her own one bedroom apartment which niece states appeared to be inhabitable. Notes patient has been incontinent of stool in the interim and has not been doing well ADL/IADL wise. She has been handling patient's financial. No history of psychiatric admissions or parasuicidal gestures reported. No recreational substance use reported.

## 2021-11-19 NOTE — CONSULT NOTE ADULT - ATTENDING COMMENTS
I have examined the pt at bedside.  The risks and the benefits of the proposed diagnostic/therapeutic approach were thoroughly discussed.   I agree with the above plan and have modified it where necessary.    89yo woman admitted to ER for a recent accidental fall.  She was found to have R foot ulcer, with possible cellulitis. Started on ABX.  We were consulted for foot drop, which by pt's admission is old, years old.    Pt partially oriented in the ER, but fully cooperative.   No focal signs, save for inability to flex or extend R foot. Sensation maintained.    Exam points to a possible R common peroneal damage. Pt needs EMG/NCV, unavailable in [facility].  Will refer to outpt neurology for evaluation.    Pt given Haldol in ER, please refrain from giving antipsychotics, rather re-orient and if needed, use low dose Seroquel or Olanzapine, if not contraindicated.

## 2021-11-19 NOTE — CONSULT NOTE ADULT - ASSESSMENT
89 yo RH female with no known PMHxpresents to the ED after a fall.     Per ED team, patient was in her bathroom, trying to sit down when she slipped and fell onto her coccyx. She was able to get off the floor and back into her bed. Denied coccyx pain, LOC, head strike.    Patient came to the ED to "make sure everything was okay."     XR lumbar spine AP and lateral negative for acute fracture.     Neurology consulted for R foot drop. At the time of neurology assessment, patient was woken from sleep in the early AM and was confused. She did not remember that she was in the hospital and asked repeatedly why she came here and if everything was okay.     Regarding the R foot drop, patient states this is a chronic issue that she has had for "years." States she never saw a doctor about it because she has been able to "deal with it" by being very careful when she walks.     She states she has a walker at home but rarely uses it, typically ambulating without assistive device.     She also notes the foot drop is associated with chronic RLE calf and shin "muscle pain."   Denies HA, dizziness, vision changes, numbness/tingling, new weakness.  unknown fall or syncope  suspect orthostatic hypotension  check orthostatic  add metoprolol er 25 mg daily  foot ulcer, iv abx ?PVD may consider LE arterial doppler  lipid pane  tsh/ b12 level  awaiting ECG  
Pt is 89yo who presents w/ RF cellulitis and wound to subQ  -pt seen and examined  -Afebrile, no leukocytosis  -Right foot erythema to the ankle, wound to subQ on dorsum of the foot w/ fibrotic wound base, does not probe beyond subQ, no malodor, no drainage  -wound culture not obtained as it would yield contaminant  -Recommend IV antibiotics  -Pod plan for local wound care  -Will follow
Assessment: 89 yo RH female with no known PMHx presents to the ED after a fall. Per ED team, patient was in her bathroom, trying to sit down when she slipped and fell onto her coccyx. She was able to get off the floor and back into her bed. Denied coccyx pain, LOC, head strike. Patient came to the ED to "make sure everything was okay." XR lumbar spine AP and lateral negative for acute fracture. Neurology consulted for R foot drop. 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 Chronic, per patient.    Recommendations:  [] Would send CBC, CMP, Magnesium, Phosphorus, and CPK.  [] Confirm chronicity of R foot drop. If it is confirmed to be a chronic problem, then would not pursue imaging.  [] Patient should be fitted with a AFO for the R foot.  [] PT/OT evals.  [] Rest of care per ED.    Case to be discussed with neurology attending Dr. Lorenzo.
89 yo woman with ?dementia,pleasantly confused  s/p fall at home  R foot ulcer- not deep  No/minimal  s/s cellulitis on my exam -discussed with podiatry -there was erythema and swelling per podiatry earlier  no s/s systemic dissemination  Rec:  A) Foot ulcer  ? cellulitis  Monitor clinically  continue IV ancef for now -can change to po keflex soon  short course   wound care per podiatry    B) falls  will defer to primary team     Will tailor plan for ID issues  per course,results.Will defer to primary team on management of other issues.  Assessment, plan and recommendations as detailed above were discussed with the medical/primary  team-Dr martinez.  Infectious Diseases Service will cover over weekend.  Please call [phone] if issues

## 2021-11-19 NOTE — ED ADULT NURSE NOTE - ED STAT RN HANDOFF WHERE
Problem: Mobility Impaired (Adult and Pediatric) Goal: *Acute Goals and Plan of Care (Insert Text) Description LTG: 
(1.)Mr. Marroquin will move from supine to sit and sit to supine  in bed with SUPERVISION within 7 treatment day(s). (2.)Mr. Marroquin will transfer from bed to chair and chair to bed with SUPERVISION using the least restrictive device within 7 treatment day(s). (3.)Mr. Marroquin will ambulate with SUPERVISION for 650 feet with the least restrictive device within 7 treatment day(s). (4)Mr. Marroquin will perform HEP with cues and assistance to increase safety on her feet in 7 days. (5)Mr. Marroquin will go up a flight of steps CGA with rail in 7 days. ________________________________________________________________________________________________ Outcome: Progressing Towards Goal 
  
PHYSICAL THERAPY: Initial Assessment and AM 4/9/2019 INPATIENT: PT Visit Days : 1 [place] / [place] / Product Type: Medicare /   
  
NAME/AGE/GENDER: Eugene Hernandez is a 76 y.o. male PRIMARY DIAGNOSIS: Small bowel obstruction ([address] [K56.609] Small bowel obstruction ([facility] [address] Small bowel obstruction ([facility] [address] Procedure(s) (LRB): 
EXPLORATORY LAPAROTOMY, LYSIS OF ADHESIONS, SMALL BOWEL RESECTION X1, ENTEROTOMY REPAIR X3, REPAIR OF CYSTOTOMY (N/A) 6 Days Post-Op ICD-10: Treatment Diagnosis:  
 Generalized Muscle Weakness (M62.81) Other abnormalities of gait and mobility (R26.89) Precaution/Allergies: 
Patient has no known allergies. ASSESSMENT:  
 
Mr. Óscar Radford presents with general weakness and decreased functional mobility s/p EXPLORATORY LAPAROTOMY, LYSIS OF ADHESIONS, SMALL BOWEL RESECTION X1, ENTEROTOMY REPAIR X3, REPAIR OF CYSTOTOMY on 4/3/19. He lives alone and was independent prior to hospitalization. He plans to go home. He ambulated in the [facility] CGA/min assist and used IV pole for an assistive device. He is generally weak with some unsteadiness on his feet.   Will follow. This section established at most recent assessment PROBLEM LIST (Impairments causing functional limitations): 
Decreased Strength Decreased ADL/Functional Activities Decreased Transfer Abilities Decreased Ambulation Ability/Technique Decreased Balance Decreased Activity Tolerance Increased Fatigue Increased Shortness of Breath Decreased Flexibility/Joint Mobility Decreased Graham with Home Exercise Program 
 INTERVENTIONS PLANNED: (Benefits and precautions of physical therapy have been discussed with the patient.) Balance Exercise Bed Mobility Family Education Gait Training Home Exercise Program (HEP) Range of Motion (ROM) Therapeutic Activites Therapeutic Exercise/Strengthening Transfer Training TREATMENT PLAN: Frequency/Duration: daily for duration of hospital stay Rehabilitation Potential For Stated Goals: Good RECOMMENDED REHABILITATION/EQUIPMENT: (at time of discharge pending progress): Due to the probability of continued deficits (see above) this patient will likely need continued skilled physical therapy after discharge. Equipment:  
None at this time HISTORY:  
History of Present Injury/Illness (Reason for Referral): EXPLORATORY LAPAROTOMY, LYSIS OF ADHESIONS, SMALL BOWEL RESECTION X1, ENTEROTOMY REPAIR X3, REPAIR OF CYSTOTOMY on 4/9/19. Past Medical History/Comorbidities:  
Mr. Demetri Coe  has a past medical history of Family history of malignant neoplasm of gastrointestinal tract, Fecal incontinence, Former cigarette smoker, History of rectal cancer, Hypothyroid, Infection and inflammatory reaction due to other internal prosthetic devices, implants and grafts, subsequent encounter (2017), Insomnia, Osteoarthritis, hand, Personal history of colonic polyps (9/2013), Personal history of malignant neoplasm of rectum, rectosigmoid junction, and anus (9/2013), and Psychiatric disorder.  He also has no past medical history of Adverse effect of anesthesia, Aneurysm ([address] Arrhythmia, Asthma, Autoimmune disease ([address] CAD (coronary artery disease), Chronic kidney disease, Chronic obstructive pulmonary disease ([address] Chronic pain, Coagulation disorder ([address] Diabetes ([address] Difficult intubation, Endocarditis, GERD (gastroesophageal reflux disease), Heart failure ([address] Hypertension, Ill-defined condition, Liver disease, Malignant hyperthermia due to anesthesia, Morbid obesity ([address] Nausea & vomiting, Other ill-defined conditions(799.89), Pseudocholinesterase deficiency, PUD (peptic ulcer disease), Rheumatic fever, Seizures ([address] Stroke ([address] Thromboembolus ([address] Unspecified adverse effect of anesthesia, or Unspecified sleep apnea. Mr. Dave Young  has a past surgical history that includes endoscopy, colon, diagnostic (last 2/3/15); hx polypectomy; hx other surgical (10/7/2013); hx colectomy (11/2013); hx colectomy (Right, 8/2014); hx hernia repair (3/2015, 5/2016); hx gi (4/2016); hx colostomy (5/11/16); hx hernia repair; hx other surgical (Bilateral); colonoscopy thru stoma (2/12/2018); colonoscopy (N/A, 2/12/2018); and hx vascular access (Left, 4/14). Social History/Living Environment:  
Home Environment: Other (comment)(townhouse/condo) One/Two Story Residence: Two story Living Alone: Yes Support Systems: Family member(s), Friends \ neighbors Patient Expects to be Discharged to[de-identified] Other (comment)(townhouse/condo) Current DME Used/Available at Home: None Prior Level of Function/Work/Activity: 
independent Number of Personal Factors/Comorbidities that affect the Plan of Care: 1-2: MODERATE COMPLEXITY EXAMINATION:  
Most Recent Physical Functioning:  
Gross Assessment: 
AROM: Generally decreased, functional 
Strength: Generally decreased, functional 
         
  
Posture: 
Posture (WDL): Exceptions to [facility] Posture Assessment: Forward head, Rounded shoulders Balance: 
Sitting: Intact; High guard Standing: With support Bed Mobility: Supine to Sit: Contact guard assistance Sit to Supine: Minimum assistance Wheelchair Mobility: 
  
Transfers: 
Sit to Stand: Contact guard assistance;Minimum assistance Stand to Sit: Contact guard assistance;Minimum assistance Gait: 
  
Speed/Alicia: Delayed Step Length: Left shortened;Right shortened Gait Abnormalities: Decreased step clearance Distance (ft): 650 Feet (ft) Assistive Device: Other (comment)(IV pole) Ambulation - Level of Assistance: Contact guard assistance;Minimal assistance Interventions: Safety awareness training;Verbal cues Duration: 10 Minutes Body Structures Involved: 
Bones Joints Muscles Ligaments Body Functions Affected: Movement Related Activities and Participation Affected: Mobility Number of elements that affect the Plan of Care: 3: MODERATE COMPLEXITY CLINICAL PRESENTATION:  
Presentation: Stable and uncomplicated: LOW COMPLEXITY CLINICAL DECISION MAKING:  
[facility] AM-PAC? ?6 Clicks? Basic Mobility Inpatient Short Form How much difficulty does the patient currently have. .. Unable A Lot A Little None 1. Turning over in bed (including adjusting bedclothes, sheets and blankets)? ? 1   ? 2   ? 3   ? 4  
2. Sitting down on and standing up from a chair with arms ( e.g., wheelchair, bedside commode, etc.)   ? 1   ? 2   ? 3   ? 4  
3. Moving from lying on back to sitting on the side of the bed?   ? 1   ? 2   ? 3   ? 4 How much help from another person does the patient currently need. .. Total A Lot A Little None 4. Moving to and from a bed to a chair (including a wheelchair)? ? 1   ? 2   ? 3   ? 4  
5. Need to walk in hospital room? ? 1   ? 2   ? 3   ? 4  
6. Climbing 3-5 steps with a railing? ? 1   ? 2   ? 3   ? 4  
© 2007, Trustees of [facility], under license to [employer]. All rights reserved Score:  Initial: 18 Most Recent: X (Date: -- ) Interpretation of Tool:  Represents activities that are increasingly more difficult (i.e. Bed mobility, Transfers, Gait). Medical Necessity:    
Patient is expected to demonstrate progress in strength, range of motion and balance 
 to decrease assistance required with theraputic exercises and functional mobility Candance Huger Reason for Services/Other Comments: 
Patient continues to require present interventions due to patient's inability to perform theraputic exercises and functional mobility Candance Huger Use of outcome tool(s) and clinical judgement create a POC that gives a: Clear prediction of patient's progress: LOW COMPLEXITY  
  
 
 
 
TREATMENT:  
(In addition to Assessment/Re-Assessment sessions the following treatments were rendered) Pre-treatment Symptoms/Complaints:  fatigue no pain 
Pain: Initial:  
   Post Session:  0 Gait Training (10 Minutes):  Gait training to improve and/or restore physical functioning as related to mobility, strength and balance. Ambulated 650 Feet (ft) with Contact guard assistance;Minimal assistance using a Other (comment)(IV pole) and minimal Safety awareness training;Verbal cues related to their stance phase and stride length to promote proper body alignment, promote proper body posture, promote proper body mechanics and promote proper body breathing techniques. Braces/Orthotics/Lines/Etc:  
IV 
brito catheter 
nasogastric tube O2 Device: Nasal cannula(3L) Treatment/Session Assessment:   
Response to Treatment:  Pt. Did fine. Could not get O2 sats to read after gait but no shortness of breath Interdisciplinary Collaboration:  
Registered Nurse Rehabilitation Attendant Respiratory Therapist 
After treatment position/precautions:  
Supine in bed Bed/Chair-wheels locked Bed in low position Call light within reach Nurse at bedside Compliance with Program/Exercises: Will assess as treatment progresses  no questions. Recommendations/Intent for next treatment session:   \"Next visit will focus on advancements to more challenging activities and reduction in assistance provided\". Total Treatment Duration: PT Patient Time In/Time Out Time In: 4405 Time Out: 1155 Moreno Gilbert, PT  
    
 
 
 gold 18-Oct-2021 06:00

## 2021-11-19 NOTE — ED ADULT NURSE NOTE - OBJECTIVE STATEMENT
89 y/o female presenting to the ER c/o lower back pain. Pt reports going to bathroom while getting up from standing from wheel chair pt slipped and lande don her coccyx, pt didn't feel any pain, was able to get up independently and get back into bed. Pt presents to [facility] A&Ox1, disoriented to time and place, pt denies LOC/head trauma after sliding from wheelchair to ground, denies use of blood thinners, upon arrival pt has R. foot drop which pt endorses has been there "forever, i've been dealing w/ this for years". Pt denies PMHx and use of medications. Pt denies chest pain, SOB/difficulty breathing, fever/chills, HA, abd pain, N/V/D, lightheadedness/dizziness, numbness/tingling. Pt A&Ox1, breathing spontaneous and unlabored, abd soft, nondistended/nontender, bed locked and in lowest position.

## 2021-11-20 LAB
ANION GAP SERPL CALC-SCNC: 12 MMOL/L — SIGNIFICANT CHANGE UP (ref 5–17)
BUN SERPL-MCNC: 10 MG/DL — SIGNIFICANT CHANGE UP (ref 7–23)
CALCIUM SERPL-MCNC: 8.8 MG/DL — SIGNIFICANT CHANGE UP (ref 8.4–10.5)
CHLORIDE SERPL-SCNC: 101 MMOL/L — SIGNIFICANT CHANGE UP (ref 96–108)
CO2 SERPL-SCNC: 21 MMOL/L — LOW (ref 22–31)
COVID-19 NUCLEOCAPSID GAM AB INTERP: NEGATIVE — SIGNIFICANT CHANGE UP
COVID-19 NUCLEOCAPSID TOTAL GAM ANTIBODY RESULT: 0.08 INDEX — SIGNIFICANT CHANGE UP
COVID-19 SPIKE DOMAIN AB INTERP: POSITIVE
COVID-19 SPIKE DOMAIN ANTIBODY RESULT: >250 U/ML — HIGH
CREAT SERPL-MCNC: 0.69 MG/DL — SIGNIFICANT CHANGE UP (ref 0.5–1.3)
GLUCOSE SERPL-MCNC: 78 MG/DL — SIGNIFICANT CHANGE UP (ref 70–99)
HCT VFR BLD CALC: 28.7 % — LOW (ref 34.5–45)
HGB BLD-MCNC: 9 G/DL — LOW (ref 11.5–15.5)
MCHC RBC-ENTMCNC: 26.7 PG — LOW (ref 27–34)
MCHC RBC-ENTMCNC: 31.4 GM/DL — LOW (ref 32–36)
MCV RBC AUTO: 85.2 FL — SIGNIFICANT CHANGE UP (ref 80–100)
NRBC # BLD: 0 /100 WBCS — SIGNIFICANT CHANGE UP (ref 0–0)
PLATELET # BLD AUTO: 292 K/UL — SIGNIFICANT CHANGE UP (ref 150–400)
POTASSIUM SERPL-MCNC: 3.6 MMOL/L — SIGNIFICANT CHANGE UP (ref 3.5–5.3)
POTASSIUM SERPL-SCNC: 3.6 MMOL/L — SIGNIFICANT CHANGE UP (ref 3.5–5.3)
RBC # BLD: 3.37 M/UL — LOW (ref 3.8–5.2)
RBC # FLD: 15.2 % — HIGH (ref 10.3–14.5)
SARS-COV-2 IGG+IGM SERPL QL IA: 0.08 INDEX — SIGNIFICANT CHANGE UP
SARS-COV-2 IGG+IGM SERPL QL IA: >250 U/ML — HIGH
SARS-COV-2 IGG+IGM SERPL QL IA: NEGATIVE — SIGNIFICANT CHANGE UP
SARS-COV-2 IGG+IGM SERPL QL IA: POSITIVE
SODIUM SERPL-SCNC: 134 MMOL/L — LOW (ref 135–145)
TSH SERPL-MCNC: 2.23 UIU/ML — SIGNIFICANT CHANGE UP (ref 0.27–4.2)
VIT B12 SERPL-MCNC: 330 PG/ML — SIGNIFICANT CHANGE UP (ref 232–1245)
WBC # BLD: 6.12 K/UL — SIGNIFICANT CHANGE UP (ref 3.8–10.5)
WBC # FLD AUTO: 6.12 K/UL — SIGNIFICANT CHANGE UP (ref 3.8–10.5)

## 2021-11-20 RX ORDER — HALOPERIDOL DECANOATE 100 MG/ML
0.5 INJECTION INTRAMUSCULAR AT BEDTIME
Refills: 0 | Status: DISCONTINUED | OUTPATIENT
Start: 2021-11-20 | End: 2021-11-22

## 2021-11-20 RX ORDER — LOSARTAN POTASSIUM 100 MG/1
25 TABLET, FILM COATED ORAL DAILY
Refills: 0 | Status: DISCONTINUED | OUTPATIENT
Start: 2021-11-20 | End: 2021-11-22

## 2021-11-20 RX ORDER — CEPHALEXIN 500 MG
500 CAPSULE ORAL EVERY 12 HOURS
Refills: 0 | Status: DISCONTINUED | OUTPATIENT
Start: 2021-11-20 | End: 2021-11-22

## 2021-11-20 RX ADMIN — MUPIROCIN 1 APPLICATION(S): 20 OINTMENT TOPICAL at 17:36

## 2021-11-20 RX ADMIN — HEPARIN SODIUM 5000 UNIT(S): 5000 INJECTION INTRAVENOUS; SUBCUTANEOUS at 06:03

## 2021-11-20 RX ADMIN — Medication 50 MICROGRAM(S): at 06:04

## 2021-11-20 RX ADMIN — Medication 500 MILLIGRAM(S): at 17:35

## 2021-11-20 RX ADMIN — Medication 100 MILLIGRAM(S): at 06:04

## 2021-11-20 RX ADMIN — Medication 25 MILLIGRAM(S): at 06:03

## 2021-11-20 RX ADMIN — HALOPERIDOL DECANOATE 0.5 MILLIGRAM(S): 100 INJECTION INTRAMUSCULAR at 21:31

## 2021-11-20 RX ADMIN — MUPIROCIN 1 APPLICATION(S): 20 OINTMENT TOPICAL at 06:03

## 2021-11-20 RX ADMIN — HEPARIN SODIUM 5000 UNIT(S): 5000 INJECTION INTRAVENOUS; SUBCUTANEOUS at 17:35

## 2021-11-20 RX ADMIN — LOSARTAN POTASSIUM 25 MILLIGRAM(S): 100 TABLET, FILM COATED ORAL at 09:14

## 2021-11-20 NOTE — PROGRESS NOTE ADULT - ASSESSMENT
89 yo RH female     with no known PMHx      presents to the ED after a fall.  h/o mlple  falls, dementia  with constatnt agitation pe r hcp     Per ED team, patient was in her bathroom, trying to sit down when she slipped and fell onto her coccyx. She was able to get off the floor and back into her bed. Denied coccyx pain, LOC, head strike. Patient came to the ED to "make sure everything was okay."    XR lumbar spine AP and lateral negative for acute fracture.   Neurology consulted for R foot drop. 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 Chronic, per patient.     * s/p fall, suspect from foot   drop  per  hcp, pt  has  had  mlple falls.  and foot ha s been  swollen since last fall 10/21/ 21, at  home  xray pelvis  and foot, no fx   no  syncope   per  pt, foot  drop is  c/c, hence  per  neuro, no w/p needed   PT eval  *   had   redness per  podiatry, , right foot, seen by podiatry   minimal cellulitis, on iv ancef  per iD, switch to keflex   today/  no redness  now  *  Dementia  with constant agitation per neice/ and is requesting psych eval   on dvt ppx     GOC   hcp Katlyn. niece  pt has no children  . pt is  a full code    resides  at [facility]              89 yo RH female     with no known PMHx      presents to the ED after a fall.  h/o mlple  falls, dementia  with constatnt agitation pe r hcp     Per ED team, patient was in her bathroom, trying to sit down when she slipped and fell onto her coccyx. She was able to get off the floor and back into her bed. Denied coccyx pain, LOC, head strike. Patient came to the ED to "make sure everything was okay."    XR lumbar spine AP and lateral negative for acute fracture.   Neurology consulted for R foot drop. 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 Chronic, per patient.     * s/p fall, suspect from foot   drop  per  hcp, pt  has  had  mlple falls.  and foot ha s been  swollen since last fall 10/21/ 21, at  home  xray pelvis  and foot, no fx   no  syncope   per  pt, foot  drop is  c/c, hence  per  neuro, no w/p needed   PT eval  *   had   redness per  podiatry, , right foot, seen by podiatry   minimal cellulitis, on iv ancef  per iD, switch to keflex   today/  no redness  now  *  Dementia  with constant agitation per neice/ and is requesting psych eval  no  need  for     blood  work, a s per  jimbo,  given pt;s  advanced  dementia   on haldol prn per  psych   on dvt ppx  labs  pending        [facility]   hcp Katlyn. niece  pt has no children  . pt is  a full code    resides  at [facility]

## 2021-11-21 PROCEDURE — 99232 SBSQ HOSP IP/OBS MODERATE 35: CPT

## 2021-11-21 RX ORDER — HALOPERIDOL DECANOATE 100 MG/ML
1 INJECTION INTRAMUSCULAR EVERY 4 HOURS
Refills: 0 | Status: DISCONTINUED | OUTPATIENT
Start: 2021-11-21 | End: 2021-11-22

## 2021-11-21 RX ORDER — PREGABALIN 225 MG/1
1000 CAPSULE ORAL DAILY
Refills: 0 | Status: DISCONTINUED | OUTPATIENT
Start: 2021-11-21 | End: 2021-11-21

## 2021-11-21 RX ADMIN — Medication 50 MICROGRAM(S): at 06:28

## 2021-11-21 RX ADMIN — HALOPERIDOL DECANOATE 1 MILLIGRAM(S): 100 INJECTION INTRAMUSCULAR at 23:24

## 2021-11-21 RX ADMIN — MUPIROCIN 1 APPLICATION(S): 20 OINTMENT TOPICAL at 20:56

## 2021-11-21 RX ADMIN — Medication 25 MILLIGRAM(S): at 06:27

## 2021-11-21 RX ADMIN — LATANOPROST 1 DROP(S): 0.05 SOLUTION/ DROPS OPHTHALMIC; TOPICAL at 00:45

## 2021-11-21 RX ADMIN — Medication 500 MILLIGRAM(S): at 20:56

## 2021-11-21 RX ADMIN — LOSARTAN POTASSIUM 25 MILLIGRAM(S): 100 TABLET, FILM COATED ORAL at 06:28

## 2021-11-21 RX ADMIN — HEPARIN SODIUM 5000 UNIT(S): 5000 INJECTION INTRAVENOUS; SUBCUTANEOUS at 20:58

## 2021-11-21 RX ADMIN — LATANOPROST 1 DROP(S): 0.05 SOLUTION/ DROPS OPHTHALMIC; TOPICAL at 23:25

## 2021-11-21 RX ADMIN — Medication 3 MILLIGRAM(S): at 00:41

## 2021-11-21 RX ADMIN — Medication 500 MILLIGRAM(S): at 06:27

## 2021-11-21 NOTE — PROGRESS NOTE ADULT - ASSESSMENT
89 yo woman with ?dementia,pleasantly confused  s/p fall at home  R foot ulcer- not deep  No/minimal  s/s cellulitis on my exam -discussed with podiatry -there was erythema and swelling per podiatry earlier  no s/s systemic dissemination  Rec:  A) Foot ulcer  ? cellulitis appears benign  suggest: Continue po keflex through 11/24  short course   wound care per podiatry    B) falls  will defer to primary team   consider Neuro evaluation of RLE parathesias, foot drop

## 2021-11-21 NOTE — CHART NOTE - NSCHARTNOTEFT_GEN_A_CORE
JUSTINE OROURKE    Notified by RN patient is very agitated and not able to take po Haldol.      Interventions taken     Haldol 1mg iv prn for severe agitation  psych consult  cont assess and monitor  f/u with am team.                    Jonnie Courtney ANP-BC  Spectralink #[id number]

## 2021-11-21 NOTE — PHYSICAL THERAPY INITIAL EVALUATION ADULT - ADDITIONAL COMMENTS
As per the pt/chart, she lives alone in a Sr. living appt ([facility]). +elevator. PTA, pt stated she was able to ambulate and perform her BADls Independently using RW.

## 2021-11-21 NOTE — PROGRESS NOTE ADULT - ASSESSMENT
89 yo RH female     with no known PMHx      presents to the ED after a fall.  h/o mlple  falls, dementia  with constatnt agitation pe r hcp     Per ED team, patient was in her bathroom, trying to sit down when she slipped and fell onto her coccyx. She was able to get off the floor and back into her bed. Denied coccyx pain, LOC, head strike. Patient came to the ED to "make sure everything was okay."    XR lumbar spine AP and lateral negative for acute fracture.   Neurology consulted for R foot drop. 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 Chronic, per patient.     * s/p fall, suspect from foot   drop  per  hcp, pt  has  had  mlple falls.  and foot ha s been  swollen since last fall 10/21/ 21, at  home  xray pelvis  and foot, no fx   no  syncope   per  pt, foot  drop is  c/c, hence  per  neuro, no w/p needed   PT eval  *   had   redness per  podiatry, , right foot, seen by podiatry   minimal cellulitis, on iv ancef  per iD,   on  keflex  now,   no redness  now  *  Dementia  with constant agitation per neice/ and is requesting psych eval  no  need  for     blood  work, a s per  jimbo,  given pt;s  advanced  dementia   on haldol prn per  psych   on dvt ppz  plan.   d/c  on  monday     [facility]   hcp Katlyn. niece  pt has no children  . pt is  a full code    resides  at [facility]

## 2021-11-22 VITALS
RESPIRATION RATE: 18 BRPM | TEMPERATURE: 98 F | HEART RATE: 86 BPM | OXYGEN SATURATION: 96 % | SYSTOLIC BLOOD PRESSURE: 158 MMHG | DIASTOLIC BLOOD PRESSURE: 80 MMHG

## 2021-11-22 LAB — SARS-COV-2 RNA SPEC QL NAA+PROBE: SIGNIFICANT CHANGE UP

## 2021-11-22 PROCEDURE — 93005 ELECTROCARDIOGRAM TRACING: CPT

## 2021-11-22 PROCEDURE — 82607 VITAMIN B-12: CPT

## 2021-11-22 PROCEDURE — 80048 BASIC METABOLIC PNL TOTAL CA: CPT

## 2021-11-22 PROCEDURE — 99285 EMERGENCY DEPT VISIT HI MDM: CPT | Mod: 25

## 2021-11-22 PROCEDURE — 84443 ASSAY THYROID STIM HORMONE: CPT

## 2021-11-22 PROCEDURE — U0003: CPT

## 2021-11-22 PROCEDURE — 73630 X-RAY EXAM OF FOOT: CPT

## 2021-11-22 PROCEDURE — 86769 SARS-COV-2 COVID-19 ANTIBODY: CPT

## 2021-11-22 PROCEDURE — 86140 C-REACTIVE PROTEIN: CPT

## 2021-11-22 PROCEDURE — 99232 SBSQ HOSP IP/OBS MODERATE 35: CPT

## 2021-11-22 PROCEDURE — 96374 THER/PROPH/DIAG INJ IV PUSH: CPT

## 2021-11-22 PROCEDURE — 80053 COMPREHEN METABOLIC PANEL: CPT

## 2021-11-22 PROCEDURE — 85027 COMPLETE CBC AUTOMATED: CPT

## 2021-11-22 PROCEDURE — 72170 X-RAY EXAM OF PELVIS: CPT

## 2021-11-22 PROCEDURE — 85652 RBC SED RATE AUTOMATED: CPT

## 2021-11-22 PROCEDURE — 97162 PT EVAL MOD COMPLEX 30 MIN: CPT

## 2021-11-22 PROCEDURE — U0005: CPT

## 2021-11-22 PROCEDURE — 72100 X-RAY EXAM L-S SPINE 2/3 VWS: CPT

## 2021-11-22 RX ORDER — LANOLIN ALCOHOL/MO/W.PET/CERES
1 CREAM (GRAM) TOPICAL
Qty: 14 | Refills: 0
Start: 2021-11-22 | End: 2021-12-05

## 2021-11-22 RX ORDER — MUPIROCIN 20 MG/G
1 OINTMENT TOPICAL
Qty: 28 | Refills: 0
Start: 2021-11-22 | End: 2021-12-05

## 2021-11-22 RX ORDER — METOPROLOL TARTRATE 50 MG
1 TABLET ORAL
Qty: 14 | Refills: 0
Start: 2021-11-22 | End: 2021-12-05

## 2021-11-22 RX ORDER — HALOPERIDOL DECANOATE 100 MG/ML
1 INJECTION INTRAMUSCULAR
Qty: 14 | Refills: 0
Start: 2021-11-22 | End: 2021-12-05

## 2021-11-22 RX ORDER — MULTIVIT-MIN/FERROUS GLUCONATE 9 MG/15 ML
1 LIQUID (ML) ORAL
Qty: 0 | Refills: 0 | DISCHARGE

## 2021-11-22 RX ORDER — LOSARTAN POTASSIUM 100 MG/1
1 TABLET, FILM COATED ORAL
Qty: 14 | Refills: 0
Start: 2021-11-22 | End: 2021-12-05

## 2021-11-22 RX ADMIN — Medication 50 MICROGRAM(S): at 06:32

## 2021-11-22 RX ADMIN — MUPIROCIN 1 APPLICATION(S): 20 OINTMENT TOPICAL at 06:33

## 2021-11-22 RX ADMIN — MUPIROCIN 1 APPLICATION(S): 20 OINTMENT TOPICAL at 17:37

## 2021-11-22 RX ADMIN — LOSARTAN POTASSIUM 25 MILLIGRAM(S): 100 TABLET, FILM COATED ORAL at 06:32

## 2021-11-22 RX ADMIN — Medication 25 MILLIGRAM(S): at 06:33

## 2021-11-22 NOTE — PROGRESS NOTE ADULT - ASSESSMENT
89 yo RH female     with no known PMHx      presents to the ED after a fall.  h/o mlple  falls, dementia  with constatnt agitation pe r hcp     Per ED team, patient was in her bathroom, trying to sit down when she slipped and fell onto her coccyx. She was able to get off the floor and back into her bed. Denied coccyx pain, LOC, head strike. Patient came to the ED to "make sure everything was okay."    XR lumbar spine AP and lateral negative for acute fracture.   Neurology consulted for R foot drop. 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 Chronic, per patient.     * s/p fall, suspect from foot   drop  per  hcp, pt  has  had  mlple falls.  and foot ha s been  swollen since last fall 10/21/ 21, at  home  xray pelvis  and foot, no fx   no  syncope   per  pt, foot  drop is  c/c, hence  per  neuro, no w/p needed   PT eval  *   had   redness per  podiatry, , right foot, seen by podiatry   minimal cellulitis, on iv ancef  per iD,   on  keflex  now,   no redness  now  *  Dementia  with constant agitation per neice/ and is requesting psych eval  no  need  for     blood  work, a s per  jimbo,  given pt;s  advanced  dementia   on haldol prn per  psych   on dvt ppz  plan.   d/c  on  monday  cleraed for  d/c      C   hcp Katlyn. niece  pt has no children  . pt is  a full code    resides  at [facility]

## 2021-11-22 NOTE — PROGRESS NOTE ADULT - ASSESSMENT
89 yo woman with ?dementia,pleasantly confused  s/p fall at home  R foot ulcer- not deep  ? cellulitis  recd 3 days of ancef-keflex already   no s/s systemic dissemination  foot exam not s/o infection  Rec:  A) Foot ulcer  ? cellulitis  Monitor clinically  observe off abx  wound care per podiatry    B) falls  will defer to primary team   Will defer to primary team on management of other issues.  Assessment, plan and recommendations as detailed above were discussed with the medical/primary  team-Dr martinez.  ID will follow as needed,please call [phone] if any questions or issues.

## 2021-11-22 NOTE — PROGRESS NOTE ADULT - ASSESSMENT
89yo w/ RF cellulitis now resolved, and wound to subQ  -pt seen and examined  -Afebrile, no leukocytosis  -Right foot erythema resolved, wound to subQ on dorsum of the foot w/ fibrotic wound base, does not probe beyond subQ, no malodor, no drainage  -wound culture not obtained as it would yield contaminant  -Recommend continued LWC w/ mupirocin and bandaid to dorsal R foot wound daily  - no further pod intervention at this time, re-consult as needed   - seen w/ attending

## 2021-11-22 NOTE — DISCHARGE NOTE NURSING/CASE MANAGEMENT/SOCIAL WORK - PATIENT PORTAL LINK FT
You can access the FollowMyHealth Patient Portal offered by [facility] by registering at the following website: [email or web address]. By joining [facility]’s FollowMyHealth portal, you will also be able to view your health information using other applications (apps) compatible with our system.

## 2021-11-22 NOTE — PROGRESS NOTE ADULT - SUBJECTIVE AND OBJECTIVE BOX
CARDIOLOGY     PROGRESS  NOTE   ________________________________________________    CHIEF COMPLAINT:Patient is a 89y old  Female who presents with a chief complaint of slip and fall (21 Nov 2021 14:00)  doing better.  	  REVIEW OF SYSTEMS:  CONSTITUTIONAL: No fever, weight loss, or fatigue  EYES: No eye pain, visual disturbances, or discharge  ENT:  No difficulty hearing, tinnitus, vertigo; No sinus or throat pain  NECK: No pain or stiffness  RESPIRATORY: No cough, wheezing, chills or hemoptysis; No Shortness of Breath  CARDIOVASCULAR: No chest pain, palpitations, passing out, dizziness, or leg swelling  GASTROINTESTINAL: No abdominal or epigastric pain. No nausea, vomiting, or hematemesis; No diarrhea or constipation. No melena or hematochezia.  GENITOURINARY: No dysuria, frequency, hematuria, or incontinence  NEUROLOGICAL: No headaches, memory loss, loss of strength, numbness, or tremors  SKIN: No itching, burning, rashes, or lesions   LYMPH Nodes: No enlarged glands  ENDOCRINE: No heat or cold intolerance; No hair loss  MUSCULOSKELETAL: No joint pain or swelling; No muscle, back, or extremity pain  PSYCHIATRIC: No depression, anxiety, mood swings, or difficulty sleeping  HEME/LYMPH: No easy bruising, or bleeding gums  ALLERGY AND IMMUNOLOGIC: No hives or eczema	    [ ] All others negative	  [ ] Unable to obtain    PHYSICAL EXAM:  T(C): 36.6 (11-22-21 @ 05:09), Max: 36.8 (11-21-21 @ 20:58)  HR: 78 (11-22-21 @ 05:09) (70 - 94)  BP: 157/71 (11-22-21 @ 05:09) (137/56 - 159/78)  RR: 18 (11-22-21 @ 05:09) (18 - 18)  SpO2: 96% (11-22-21 @ 05:09) (95% - 97%)  Wt(kg): --  I&O's Summary    20 Nov 2021 07:01  -  21 Nov 2021 07:00  --------------------------------------------------------  IN: 540 mL / OUT: 0 mL / NET: 540 mL    21 Nov 2021 07:01  -  22 Nov 2021 06:37  --------------------------------------------------------  IN: 560 mL / OUT: 900 mL / NET: -340 mL        Appearance: Normal	  HEENT:   Normal oral mucosa, PERRL, EOMI	  Lymphatic: No lymphadenopathy  Cardiovascular: Normal S1 S2, No JVD, + murmurs, No edema  Gastrointestinal:  Soft, Non-tender, + BS	  Skin: No rashes, No ecchymoses, No cyanosis	  Neurologic: Non-focal  Extremities: Normal range of motion, No clubbing, cyanosis or edema  Vascular: Peripheral pulses palpable 2+ bilaterally    MEDICATIONS  (STANDING):  cephalexin 500 milliGRAM(s) Oral every 12 hours  haloperidol     Tablet 0.5 milliGRAM(s) Oral at bedtime  heparin   Injectable 5000 Unit(s) SubCutaneous every 12 hours  latanoprost 0.005% Ophthalmic Solution 1 Drop(s) Both EYES at bedtime  levothyroxine 50 MICROGram(s) Oral daily  losartan 25 milliGRAM(s) Oral daily  metoprolol succinate ER 25 milliGRAM(s) Oral daily  mupirocin 2% Ointment 1 Application(s) Topical two times a day      TELEMETRY: 	    ECG:  	  RADIOLOGY:  OTHER: 	  	  LABS:	 	    CARDIAC MARKERS:                                9.0    6.12  )-----------( 292      ( 20 Nov 2021 11:56 )             28.7     11-20    134<L>  |  101  |  10  ----------------------------<  78  3.6   |  21<L>  |  0.69    Ca    8.8      20 Nov 2021 11:56      proBNP:   Lipid Profile:   HgA1c:   TSH: Thyroid Stimulating Hormone, Serum: 2.23 uIU/mL (11-20 @ 14:31)          Assessment and plan  ---------------------------  89 yo RH female with no known PMHxpresents to the ED after a fall.     Per ED team, patient was in her bathroom, trying to sit down when she slipped and fell onto her coccyx. She was able to get off the floor and back into her bed. Denied coccyx pain, LOC, head strike.    Patient came to the ED to "make sure everything was okay."     XR lumbar spine AP and lateral negative for acute fracture.     Neurology consulted for R foot drop. At the time of neurology assessment, patient was woken from sleep in the early AM and was confused. She did not remember that she was in the hospital and asked repeatedly why she came here and if everything was okay.     Regarding the R foot drop, patient states this is a chronic issue that she has had for "years." States she never saw a doctor about it because she has been able to "deal with it" by being very careful when she walks.     She states she has a walker at home but rarely uses it, typically ambulating without assistive device.     She also notes the foot drop is associated with chronic RLE calf and shin "muscle pain."   Denies HA, dizziness, vision changes, numbness/tingling, new weakness.  unknown fall or syncope  suspect orthostatic hypotension  check orthostatic  add metoprolol er 25 mg daily  foot ulcer, iv abx ?PVD may consider LE arterial doppler  lipid panel  tsh/ b12 level notred  ecg atrial sensed with paced  still with increase bp increase losartan to 50 mg daily  check orthostatic      	        
  Follow Up:  foot ulcer    Interval History/ROS: "disgusted, I want to go home and be with my dog"  notes parathesias in RLE    Allergies  No Known Allergies    ANTIMICROBIALS:  cephalexin 500 every 12 hours      OTHER MEDS:  MEDICATIONS  (STANDING):  acetaminophen     Tablet .. 650 every 6 hours PRN  haloperidol     Tablet 0.5 at bedtime  heparin   Injectable 5000 every 12 hours  levothyroxine 50 daily  losartan 25 daily  melatonin 3 at bedtime PRN  metoprolol succinate ER 25 daily      Vital Signs Last 24 Hrs  T(C): 36.5 (21 Nov 2021 05:10), Max: 37.2 (20 Nov 2021 16:31)  T(F): 97.7 (21 Nov 2021 05:10), Max: 99 (20 Nov 2021 16:31)  HR: 94 (21 Nov 2021 09:31) (70 - 94)  BP: 137/56 (21 Nov 2021 09:31) (132/76 - 158/75)  BP(mean): --  RR: 18 (21 Nov 2021 05:10) (18 - 18)  SpO2: 95% (21 Nov 2021 09:31) (93% - 97%)    PHYSICAL EXAM:  General: WN/WD NAD, Non-toxic  Neurology: A&Ox3, nonfocal, right foot drop  Respiratory: Clear to auscultation bilaterally  CV: RRR, S1S2, no murmurs, rubs or gallops  Abdominal: Soft, Non-tender, non-distended, normal bowel sounds  Extremities: No edema, dorsum of foot with small eschar with area of reactive erythema, no purulence noted  Line Sites: Clear  Skin: No rash                          9.0    6.12  )-----------( 292      ( 20 Nov 2021 11:56 )             28.7       11-20    134<L>  |  101  |  10  ----------------------------<  78  3.6   |  21<L>  |  0.69    Ca    8.8      20 Nov 2021 11:56    RADIOLOGY:  < from: Xray Lumbar Spine AP + Lateral (11.18.21 @ 23:15) >  No acute fracture identified radiographically. Extensive degenerative changes as above.    < end of copied text >      Bernardo Bundy MD; Division of Infectious Disease; Pager: [phone]; nights and weekends: [phone]
           CARDIOLOGY     PROGRESS  NOTE   ________________________________________________    CHIEF COMPLAINT:Patient is a 89y old  Female who presents with a chief complaint of change of mental status (19 Nov 2021 18:06)  no complain.  	  REVIEW OF SYSTEMS:  CONSTITUTIONAL: No fever, weight loss, or fatigue  EYES: No eye pain, visual disturbances, or discharge  ENT:  No difficulty hearing, tinnitus, vertigo; No sinus or throat pain  NECK: No pain or stiffness  RESPIRATORY: No cough, wheezing, chills or hemoptysis; No Shortness of Breath  CARDIOVASCULAR: No chest pain, palpitations, passing out, dizziness, or leg swelling  GASTROINTESTINAL: No abdominal or epigastric pain. No nausea, vomiting, or hematemesis; No diarrhea or constipation. No melena or hematochezia.  GENITOURINARY: No dysuria, frequency, hematuria, or incontinence  NEUROLOGICAL: No headaches, memory loss, loss of strength, numbness, or tremors  SKIN: No itching, burning, rashes, or lesions   LYMPH Nodes: No enlarged glands  ENDOCRINE: No heat or cold intolerance; No hair loss  MUSCULOSKELETAL: No joint pain or swelling; No muscle, back, or extremity pain  PSYCHIATRIC: No depression, anxiety, mood swings, or difficulty sleeping  HEME/LYMPH: No easy bruising, or bleeding gums  ALLERGY AND IMMUNOLOGIC: No hives or eczema	    [ ] All others negative	  [ ] Unable to obtain    PHYSICAL EXAM:  T(C): 36.6 (11-20-21 @ 06:27), Max: 36.9 (11-19-21 @ 16:29)  HR: 76 (11-20-21 @ 06:27) (76 - 90)  BP: 169/78 (11-20-21 @ 06:27) (155/82 - 172/90)  RR: 16 (11-20-21 @ 06:27) (15 - 19)  SpO2: 96% (11-20-21 @ 06:27) (95% - 99%)  Wt(kg): --  I&O's Summary    19 Nov 2021 07:01  -  20 Nov 2021 07:00  --------------------------------------------------------  IN: 700 mL / OUT: 0 mL / NET: 700 mL        Appearance: Normal	  HEENT:   Normal oral mucosa, PERRL, EOMI	  Lymphatic: No lymphadenopathy  Cardiovascular: Normal S1 S2, No JVD, + murmurs, No edema  Respiratory: Lungs clear to auscultation	  Gastrointestinal:  Soft, Non-tender, + BS	  Skin: No rashes, No ecchymoses, No cyanosis	  Neurologic: Non-focal  Extremities: Normal range of motion, No clubbing, cyanosis or edema  Vascular: Peripheral pulses palpable 2+ bilaterally    MEDICATIONS  (STANDING):  cephalexin 500 milliGRAM(s) Oral every 12 hours  haloperidol     Tablet 0.5 milliGRAM(s) Oral at bedtime  heparin   Injectable 5000 Unit(s) SubCutaneous every 12 hours  latanoprost 0.005% Ophthalmic Solution 1 Drop(s) Both EYES at bedtime  levothyroxine 50 MICROGram(s) Oral daily  metoprolol succinate ER 25 milliGRAM(s) Oral daily  mupirocin 2% Ointment 1 Application(s) Topical two times a day      TELEMETRY: 	    ECG:  	  RADIOLOGY:  OTHER: 	  	  LABS:	 	    CARDIAC MARKERS:                                9.6    8.40  )-----------( 326      ( 19 Nov 2021 05:46 )             29.6     11-19    133<L>  |  97  |  16  ----------------------------<  93  4.0   |  23  |  0.79    Ca    9.1      19 Nov 2021 05:46    TPro  6.6  /  Alb  3.7  /  TBili  0.4  /  DBili  x   /  AST  24  /  ALT  18  /  AlkPhos  77  11-19    proBNP:   Lipid Profile:   HgA1c:   TSH:         Assessment and plan  ---------------------------  89 yo RH female with no known PMHxpresents to the ED after a fall.     Per ED team, patient was in her bathroom, trying to sit down when she slipped and fell onto her coccyx. She was able to get off the floor and back into her bed. Denied coccyx pain, LOC, head strike.    Patient came to the ED to "make sure everything was okay."     XR lumbar spine AP and lateral negative for acute fracture.     Neurology consulted for R foot drop. At the time of neurology assessment, patient was woken from sleep in the early AM and was confused. She did not remember that she was in the hospital and asked repeatedly why she came here and if everything was okay.     Regarding the R foot drop, patient states this is a chronic issue that she has had for "years." States she never saw a doctor about it because she has been able to "deal with it" by being very careful when she walks.     She states she has a walker at home but rarely uses it, typically ambulating without assistive device.     She also notes the foot drop is associated with chronic RLE calf and shin "muscle pain."   Denies HA, dizziness, vision changes, numbness/tingling, new weakness.  unknown fall or syncope  suspect orthostatic hypotension  check orthostatic  add metoprolol er 25 mg daily  foot ulcer, iv abx ?PVD may consider LE arterial doppler  lipid pane  tsh/ b12 level  awaiting ECG, ordered  add losartan 25 mg daily  check orthostatic    	        
           CARDIOLOGY     PROGRESS  NOTE   ________________________________________________    CHIEF COMPLAINT:Patient is a 89y old  Female who presents with a chief complaint of change of mental status (19 Nov 2021 18:06)  no complain/ sleeping.  	  REVIEW OF SYSTEMS:  CONSTITUTIONAL: No fever, weight loss, or fatigue  EYES: No eye pain, visual disturbances, or discharge  ENT:  No difficulty hearing, tinnitus, vertigo; No sinus or throat pain  NECK: No pain or stiffness  RESPIRATORY: No cough, wheezing, chills or hemoptysis; No Shortness of Breath  CARDIOVASCULAR: No chest pain, palpitations, passing out, dizziness, or leg swelling  GASTROINTESTINAL: No abdominal or epigastric pain. No nausea, vomiting, or hematemesis; No diarrhea or constipation. No melena or hematochezia.  GENITOURINARY: No dysuria, frequency, hematuria, or incontinence  NEUROLOGICAL: No headaches, memory loss, loss of strength, numbness, or tremors  SKIN: No itching, burning, rashes, or lesions   LYMPH Nodes: No enlarged glands  ENDOCRINE: No heat or cold intolerance; No hair loss  MUSCULOSKELETAL: No joint pain or swelling; No muscle, back, or extremity pain  PSYCHIATRIC: No depression, anxiety, mood swings, or difficulty sleeping  HEME/LYMPH: No easy bruising, or bleeding gums  ALLERGY AND IMMUNOLOGIC: No hives or eczema	    [ ] All others negative	  [ ] Unable to obtain    PHYSICAL EXAM:  T(C): 36.5 (11-21-21 @ 05:10), Max: 37.2 (11-20-21 @ 16:31)  HR: 70 (11-21-21 @ 05:10) (70 - 82)  BP: 140/69 (11-21-21 @ 05:10) (132/76 - 158/75)  RR: 18 (11-21-21 @ 05:10) (18 - 18)  SpO2: 93% (11-21-21 @ 05:10) (93% - 97%)  Wt(kg): --  I&O's Summary    20 Nov 2021 07:01  -  21 Nov 2021 07:00  --------------------------------------------------------  IN: 540 mL / OUT: 0 mL / NET: 540 mL        Appearance: Normal	  HEENT:   Normal oral mucosa, PERRL, EOMI	  Lymphatic: No lymphadenopathy  Cardiovascular: Normal S1 S2, No JVD, +murmurs, No edema  Respiratory: Lungs clear to auscultation	  Psychiatry: sleeping  Gastrointestinal:  Soft, Non-tender, + BS	  Skin: No rashes, No ecchymoses, No cyanosis	  Neurologic: Non-focal  Extremities: Normal range of motion, No clubbing, cyanosis or edema  Vascular: Peripheral pulses palpable 2+ bilaterally    MEDICATIONS  (STANDING):  cephalexin 500 milliGRAM(s) Oral every 12 hours  haloperidol     Tablet 0.5 milliGRAM(s) Oral at bedtime  heparin   Injectable 5000 Unit(s) SubCutaneous every 12 hours  latanoprost 0.005% Ophthalmic Solution 1 Drop(s) Both EYES at bedtime  levothyroxine 50 MICROGram(s) Oral daily  losartan 25 milliGRAM(s) Oral daily  metoprolol succinate ER 25 milliGRAM(s) Oral daily  mupirocin 2% Ointment 1 Application(s) Topical two times a day      TELEMETRY: 	    ECG:  	  RADIOLOGY:  OTHER: 	  	  LABS:	 	    CARDIAC MARKERS:                                9.0    6.12  )-----------( 292      ( 20 Nov 2021 11:56 )             28.7     11-20    134<L>  |  101  |  10  ----------------------------<  78  3.6   |  21<L>  |  0.69    Ca    8.8      20 Nov 2021 11:56      proBNP:   Lipid Profile:   HgA1c:   TSH: Thyroid Stimulating Hormone, Serum: 2.23 uIU/mL (11-20 @ 14:31)      Vitamin B12, Serum in AM (11.20.21 @ 14:31)    Vitamin B12, Serum: 330 pg/mL    < from: 12 Lead ECG (11.19.21 @ 17:30) >  Diagnosis Line Atrial-sensed ventricular-paced rhythm  ABNORMAL ECG  NO PREVIOUS ECGS AVAILABLE        Assessment and plan  ---------------------------  89 yo RH female with no known PMHxpresents to the ED after a fall.     Per ED team, patient was in her bathroom, trying to sit down when she slipped and fell onto her coccyx. She was able to get off the floor and back into her bed. Denied coccyx pain, LOC, head strike.    Patient came to the ED to "make sure everything was okay."     XR lumbar spine AP and lateral negative for acute fracture.     Neurology consulted for R foot drop. At the time of neurology assessment, patient was woken from sleep in the early AM and was confused. She did not remember that she was in the hospital and asked repeatedly why she came here and if everything was okay.     Regarding the R foot drop, patient states this is a chronic issue that she has had for "years." States she never saw a doctor about it because she has been able to "deal with it" by being very careful when she walks.     She states she has a walker at home but rarely uses it, typically ambulating without assistive device.     She also notes the foot drop is associated with chronic RLE calf and shin "muscle pain."   Denies HA, dizziness, vision changes, numbness/tingling, new weakness.  unknown fall or syncope  suspect orthostatic hypotension  check orthostatic  add metoprolol er 25 mg daily  foot ulcer, iv abx ?PVD may consider LE arterial doppler  lipid panel  tsh/ b12 level notred  ecg atrial sensed with paced  add losartan 25 mg daily, will increase metoprolol to 50 mg daily if increase bp, pt has ppm  check orthostatic    	        
    afberile  REVIEW OF SYSTEMS:  GEN: no fever,    no chills  RESP: no SOB,   no cough  CVS: no chest pain,   no palpitations  GI: no abdominal pain,   no nausea,   no vomiting,   no constipation,   no diarrhea  : no dysuria,   no frequency  NEURO: no headache,   no dizziness  PSYCH: no depression,   not anxious  Derm : no rash    MEDICATIONS  (STANDING):  cephalexin 500 milliGRAM(s) Oral every 12 hours  haloperidol     Tablet 0.5 milliGRAM(s) Oral at bedtime  heparin   Injectable 5000 Unit(s) SubCutaneous every 12 hours  latanoprost 0.005% Ophthalmic Solution 1 Drop(s) Both EYES at bedtime  levothyroxine 50 MICROGram(s) Oral daily  losartan 25 milliGRAM(s) Oral daily  metoprolol succinate ER 25 milliGRAM(s) Oral daily  mupirocin 2% Ointment 1 Application(s) Topical two times a day    MEDICATIONS  (PRN):  acetaminophen     Tablet .. 650 milliGRAM(s) Oral every 6 hours PRN Temp greater or equal to 38C (100.4F), Mild Pain (1 - 3)  melatonin 3 milliGRAM(s) Oral at bedtime PRN Insomnia      Vital Signs Last 24 Hrs  T(C): 36.5 (21 Nov 2021 05:10), Max: 37.2 (20 Nov 2021 16:31)  T(F): 97.7 (21 Nov 2021 05:10), Max: 99 (20 Nov 2021 16:31)  HR: 70 (21 Nov 2021 05:10) (70 - 82)  BP: 140/69 (21 Nov 2021 05:10) (132/76 - 158/75)  BP(mean): --  RR: 18 (21 Nov 2021 05:10) (18 - 18)  SpO2: 93% (21 Nov 2021 05:10) (93% - 97%)  CAPILLARY BLOOD GLUCOSE        I&O's Summary    19 Nov 2021 07:01  -  20 Nov 2021 07:00  --------------------------------------------------------  IN: 700 mL / OUT: 0 mL / NET: 700 mL    20 Nov 2021 07:01  -  21 Nov 2021 06:53  --------------------------------------------------------  IN: 540 mL / OUT: 0 mL / NET: 540 mL        PHYSICAL EXAM:  HEAD:  Atraumatic, Normocephalic  NECK: Supple, No   JVD  CHEST/LUNG:   no     rales,     no,    rhonchi  HEART: Regular rate and rhythm;         murmur  ABDOMEN: Soft, Nontender, ;   EXTREMITIES:   no    edema  NEUROLOGY:  alert    LABS:                        9.0    6.12  )-----------( 292      ( 20 Nov 2021 11:56 )             28.7     11-20    134<L>  |  101  |  10  ----------------------------<  78  3.6   |  21<L>  |  0.69    Ca    8.8      20 Nov 2021 11:56                      Thyroid Stimulating Hormone, Serum: 2.23 uIU/mL (11-20 @ 14:31)          Consultant(s) Notes Reviewed:      Care Discussed with Consultants/Other Providers:    
    afberile  REVIEW OF SYSTEMS:  GEN: no fever,    no chills  RESP: no SOB,   no cough  CVS: no chest pain,   no palpitations  GI: no abdominal pain,   no nausea,   no vomiting,   no constipation,   no diarrhea  : no dysuria,   no frequency  NEURO: no headache,   no dizziness  PSYCH: no depression,   not anxious  Derm : no rash    MEDICATIONS  (STANDING):  cephalexin 500 milliGRAM(s) Oral every 12 hours  haloperidol     Tablet 0.5 milliGRAM(s) Oral at bedtime  heparin   Injectable 5000 Unit(s) SubCutaneous every 12 hours  latanoprost 0.005% Ophthalmic Solution 1 Drop(s) Both EYES at bedtime  levothyroxine 50 MICROGram(s) Oral daily  losartan 25 milliGRAM(s) Oral daily  metoprolol succinate ER 25 milliGRAM(s) Oral daily  mupirocin 2% Ointment 1 Application(s) Topical two times a day    MEDICATIONS  (PRN):  melatonin 3 milliGRAM(s) Oral at bedtime PRN Insomnia      Vital Signs Last 24 Hrs  T(C): 36.6 (22 Nov 2021 05:09), Max: 36.8 (21 Nov 2021 20:58)  T(F): 97.9 (22 Nov 2021 05:09), Max: 98.3 (21 Nov 2021 20:58)  HR: 78 (22 Nov 2021 05:09) (70 - 94)  BP: 157/71 (22 Nov 2021 05:09) (137/56 - 159/78)  BP(mean): --  RR: 18 (22 Nov 2021 05:09) (18 - 18)  SpO2: 96% (22 Nov 2021 05:09) (95% - 97%)  CAPILLARY BLOOD GLUCOSE        I&O's Summary    21 Nov 2021 07:01  -  22 Nov 2021 07:00  --------------------------------------------------------  IN: 560 mL / OUT: 900 mL / NET: -340 mL        PHYSICAL EXAM:  HEAD:  Atraumatic, Normocephalic  NECK: Supple, No   JVD  CHEST/LUNG:   no     rales,     no,    rhonchi  HEART: Regular rate and rhythm;         murmur  ABDOMEN: Soft, Nontender, ;   EXTREMITIES:     no   edema  NEUROLOGY:  alert    LABS:                        9.0    6.12  )-----------( 292      ( 20 Nov 2021 11:56 )             28.7     11-20    134<L>  |  101  |  10  ----------------------------<  78  3.6   |  21<L>  |  0.69    Ca    8.8      20 Nov 2021 11:56                      Thyroid Stimulating Hormone, Serum: 2.23 uIU/mL (11-20 @ 14:31)          Consultant(s) Notes Reviewed:      Care Discussed with Consultants/Other Providers:    
    afebrile  REVIEW OF SYSTEMS:  GEN: no fever,    no chills  RESP: no SOB,   no cough  CVS: no chest pain,   no palpitations  GI: no abdominal pain,   no nausea,   no vomiting,   no constipation,   no diarrhea  : no dysuria,   no frequency  NEURO: no headache,   no dizziness  PSYCH: no depression,   not anxious  Derm : no rash    MEDICATIONS  (STANDING):  ceFAZolin   IVPB 1000 milliGRAM(s) IV Intermittent every 12 hours  heparin   Injectable 5000 Unit(s) SubCutaneous every 12 hours  latanoprost 0.005% Ophthalmic Solution 1 Drop(s) Both EYES at bedtime  levothyroxine 50 MICROGram(s) Oral daily  metoprolol succinate ER 25 milliGRAM(s) Oral daily  mupirocin 2% Ointment 1 Application(s) Topical two times a day  sodium chloride 0.9%. 1000 milliLiter(s) (50 mL/Hr) IV Continuous <Continuous>    MEDICATIONS  (PRN):  acetaminophen     Tablet .. 650 milliGRAM(s) Oral every 6 hours PRN Temp greater or equal to 38C (100.4F), Mild Pain (1 - 3)  melatonin 3 milliGRAM(s) Oral at bedtime PRN Insomnia      Vital Signs Last 24 Hrs  T(C): 36.6 (20 Nov 2021 06:27), Max: 36.9 (19 Nov 2021 16:29)  T(F): 97.9 (20 Nov 2021 06:27), Max: 98.4 (19 Nov 2021 16:29)  HR: 76 (20 Nov 2021 06:27) (76 - 90)  BP: 169/78 (20 Nov 2021 06:27) (155/82 - 172/90)  BP(mean): --  RR: 16 (20 Nov 2021 06:27) (15 - 19)  SpO2: 96% (20 Nov 2021 06:27) (95% - 99%)  CAPILLARY BLOOD GLUCOSE        I&O's Summary    19 Nov 2021 07:01  -  20 Nov 2021 07:00  --------------------------------------------------------  IN: 700 mL / OUT: 0 mL / NET: 700 mL        PHYSICAL EXAM:  HEAD:  Atraumatic, Normocephalic  NECK: Supple, No   JVD  CHEST/LUNG:   no     rales,     no,    rhonchi  HEART: Regular rate and rhythm;         murmur  ABDOMEN: Soft, Nontender, ;   EXTREMITIES:    no    edema  NEUROLOGY:  alert    LABS:                        9.6    8.40  )-----------( 326      ( 19 Nov 2021 05:46 )             29.6     11-19    133<L>  |  97  |  16  ----------------------------<  93  4.0   |  23  |  0.79    Ca    9.1      19 Nov 2021 05:46    TPro  6.6  /  Alb  3.7  /  TBili  0.4  /  DBili  x   /  AST  24  /  ALT  18  /  AlkPhos  77  11-19                            Consultant(s) Notes Reviewed:      Care Discussed with Consultants/Other Providers:    
Patient is a 89y old  Female who presents with a chief complaint of slip and fall (21 Nov 2021 14:00)    Being followed by ID for foot cellulitis    Interval history:denies any foot pain  No acute events      ROS:Not reliable historian  No cough,SOB,CP  No N/V/D./abd pain  No other complaints      Antimicrobials:  Keflex Dced today     Other medications reviewed    Vital Signs Last 24 Hrs  T(C): 36.6 (11-22-21 @ 05:09), Max: 36.8 (11-21-21 @ 20:58)  T(F): 97.9 (11-22-21 @ 05:09), Max: 98.3 (11-21-21 @ 20:58)  HR: 78 (11-22-21 @ 05:09) (78 - 88)  BP: 157/71 (11-22-21 @ 05:09) (157/71 - 159/78)  BP(mean): --  RR: 18 (11-22-21 @ 05:09) (18 - 18)  SpO2: 96% (11-22-21 @ 05:09) (95% - 96%)    Physical Exam:        HEENT PERRLA EOMI    No oral exudate or erythema    Chest Good AE,CTA    CVS RRR S1 S2 WNl No murmur or rub or gallop    Abd soft BS normal No tenderness no masses  R foot wound - no discharge no erythema or tenderness  No s/s cellulitis     IV site no erythema tenderness or discharge    CNS AAO X 1 no focal    Lab Data:      WBC Count: 6.12 (11-20-21 @ 11:56)  WBC Count: 8.40 (11-19-21 @ 05:46)          Creatinine, Serum: 0.69 mg/dL (11-20-21 @ 11:56)  Creatinine, Serum: 0.79 mg/dL (11-19-21 @ 05:46)                      
Podiatry pager #: [phone] ([facility])/ [phone] ([facility])    Patient is a 89y old  Female who presents with a chief complaint of slip and fall (21 Nov 2021 14:00)       INTERVAL HPI/OVERNIGHT EVENTS:  Patient seen and evaluated at bedside.  Pt is resting comfortable in NAD. Denies N/V/F/C.     Allergies    No Known Allergies    Intolerances        Vital Signs Last 24 Hrs  T(C): 36.6 (22 Nov 2021 05:09), Max: 36.8 (21 Nov 2021 20:58)  T(F): 97.9 (22 Nov 2021 05:09), Max: 98.3 (21 Nov 2021 20:58)  HR: 78 (22 Nov 2021 05:09) (70 - 88)  BP: 157/71 (22 Nov 2021 05:09) (157/71 - 159/78)  BP(mean): --  RR: 18 (22 Nov 2021 05:09) (18 - 18)  SpO2: 96% (22 Nov 2021 05:09) (95% - 97%)    LABS:                        9.0    6.12  )-----------( 292      ( 20 Nov 2021 11:56 )             28.7     11-20    134<L>  |  101  |  10  ----------------------------<  78  3.6   |  21<L>  |  0.69    Ca    8.8      20 Nov 2021 11:56          CAPILLARY BLOOD GLUCOSE          Lower Extremity Physical Exam:  Vasular: DP/PT 1/4, B/L, CFT <3 seconds B/L, Temperature gradient WNL, B/L.   Neuro: Epicritic sensation diminished to the level of digits, B/L.  Musculoskeletal/Ortho: unremarkable    Skin: Right foot resolved erythema, wound to subQ on dorsum of the foot w/ fibrotic wound base, does not probe beyond subQ, no malodor, no drainage    RADIOLOGY & ADDITIONAL TESTS:  
Pt. is a 89 YOF who slipped and fell. Pt. has an ulceration on the right foot and is bandaged. Pt. was measured and evaluated for Right AFO to control the dropfoot condition. Pt. was fitted with a size extra small . A Darco shoe was incorporated with the brace ,and 2 socks. Brace ,shoe and sock were placed in pt's closet. Any questions ,federico call [facility] at [phone]

## 2021-11-22 NOTE — PROGRESS NOTE ADULT - PROVIDER SPECIALTY LIST ADULT
Cardiology
Infectious Disease
Orthopedics
Podiatry
Internal Medicine
Internal Medicine
Intervent Cardiology
Infectious Disease

## 2022-03-12 ENCOUNTER — EMERGENCY (EMERGENCY)
Facility: HOSPITAL | Age: 87
LOS: 1 days | Discharge: HOME | End: 2022-03-12
Attending: EMERGENCY MEDICINE
Payer: MEDICARE

## 2022-03-12 VITALS
DIASTOLIC BLOOD PRESSURE: 75 MMHG | TEMPERATURE: 98 F | SYSTOLIC BLOOD PRESSURE: 157 MMHG | HEART RATE: 81 BPM | RESPIRATION RATE: 16 BRPM | OXYGEN SATURATION: 95 %

## 2022-03-12 VITALS
DIASTOLIC BLOOD PRESSURE: 76 MMHG | RESPIRATION RATE: 18 BRPM | SYSTOLIC BLOOD PRESSURE: 160 MMHG | OXYGEN SATURATION: 96 % | HEIGHT: 60 IN | WEIGHT: 110.01 LBS | HEART RATE: 80 BPM | TEMPERATURE: 98 F

## 2022-03-12 PROCEDURE — 99283 EMERGENCY DEPT VISIT LOW MDM: CPT | Mod: 25

## 2022-03-12 PROCEDURE — 73630 X-RAY EXAM OF FOOT: CPT | Mod: 26,RT

## 2022-03-12 PROCEDURE — 12004 RPR S/N/AX/GEN/TRK7.6-12.5CM: CPT

## 2022-03-12 PROCEDURE — 73630 X-RAY EXAM OF FOOT: CPT

## 2022-03-12 PROCEDURE — 90715 TDAP VACCINE 7 YRS/> IM: CPT

## 2022-03-12 PROCEDURE — 90471 IMMUNIZATION ADMIN: CPT

## 2022-03-12 RX ORDER — TETANUS TOXOID, REDUCED DIPHTHERIA TOXOID AND ACELLULAR PERTUSSIS VACCINE, ADSORBED 5; 2.5; 8; 8; 2.5 [IU]/.5ML; [IU]/.5ML; UG/.5ML; UG/.5ML; UG/.5ML
0.5 SUSPENSION INTRAMUSCULAR ONCE
Refills: 0 | Status: COMPLETED | OUTPATIENT
Start: 2022-03-12 | End: 2022-03-12

## 2022-03-12 RX ADMIN — TETANUS TOXOID, REDUCED DIPHTHERIA TOXOID AND ACELLULAR PERTUSSIS VACCINE, ADSORBED 0.5 MILLILITER(S): 5; 2.5; 8; 8; 2.5 SUSPENSION INTRAMUSCULAR at 16:16

## 2022-03-12 NOTE — ED PROVIDER NOTE - PROGRESS NOTE DETAILS
Spoke to patient sending facility, the [facility]. Advised pt laceration repaired. Made aware that pt attempted to ambulate but had difficulty with walker given recent laceration and discomfort. I was advised that it is safe for pt to return given that she already is a 2 person assist at the facility and does not ambulate independently. Non-emergent form submitted, pending return to the [facility]   Saundra Alves PA-C

## 2022-03-12 NOTE — ED PROVIDER NOTE - OBJECTIVE STATEMENT
89 year old female with pmhx HTN, hypothyroid, Breast CA s/p mastectomy, dementia, PPM presents to ED c/o R foot laceration. Pt was being moved from bed to chair and states that there was metal on the bed which she cut the bottom of her R foot on. Denies other injuries, only c/o R foot pain 89 year old female with pmhx HTN, hypothyroid, Breast CA s/p mastectomy, dementia, PPM presents to ED c/o R foot laceration. Pt was being moved from bed to chair and states that there was metal on the bed which she cut the bottom of her R foot on. Denies other injuries, only c/o R foot pain    Attendinyo female presents with right foot laceration.  pt cut the foot when transferring to/from a bed with a metal railing.  no active bleeding.  no other injury

## 2022-03-12 NOTE — ED ADULT NURSE REASSESSMENT NOTE - NS ED NURSE REASSESS COMMENT FT1
ED PA at bedside for laceration repair
Pt readjusted in bed, provided pillow for comfort. Pending nonemergent back to [facility]. Will continue to monitor.
Uche Vuong (#[phone]) spoke with Nursing Home Director Tawanda. Verbalized understanding of discharge instructions and pt currently pending nonemergent ambulance back to facility.
rt foot sutures and dsg intact, no active bleeding

## 2022-03-12 NOTE — ED ADULT NURSE NOTE - NSIMPLEMENTINTERV_GEN_ALL_ED
Implemented All Fall with Harm Risk Interventions:  [place] to call system. Call bell, personal items and telephone within reach. Instruct patient to call for assistance. Room bathroom lighting operational. Non-slip footwear when patient is off stretcher. Physically safe environment: no spills, clutter or unnecessary equipment. Stretcher in lowest position, wheels locked, appropriate side rails in place. Provide visual cue, wrist band, yellow gown, etc. Monitor gait and stability. Monitor for mental status changes and reorient to person, place, and time. Review medications for side effects contributing to fall risk. Reinforce activity limits and safety measures with patient and family. Provide visual clues: red socks.

## 2022-03-12 NOTE — ED ADULT NURSE NOTE - OBJECTIVE STATEMENT
Pt arrived via EMS from [facility] floor post injury to rt foot. Pt A/O x 2 ( disoriented to date) and states "they were getting me out from bed into the chair and my foot got sliced by the metal framing of the bed."  Neurovascular check to rt foot intact.

## 2022-03-12 NOTE — ED ADULT NURSE NOTE - ED STAT RN HANDOFF DETAILS
report given to receiving RN NIMA Driver. waiting for nonemergent ambulance for transfer back to [facility] living

## 2022-03-12 NOTE — ED PROVIDER NOTE - PHYSICAL EXAMINATION
CONSTITUTIONAL: Patient is awake, alert Patient is well appearing and in no acute distress  HEAD: NCAT  NECK: supple, FROM  LUNGS: CTA B/L, no wheezing or rales   HEART: PPM visualized L upper chest, RRR.+S1S2 no murmurs   ABDOMEN: Soft nd/nttp  EXTREMITY: FROM upper and lower ext b/l  SKIN: Approx 8cm horizontal laceration to R heel without active bleeding   NEURO: No focal deficits

## 2022-03-12 NOTE — ED PROVIDER NOTE - PATIENT PORTAL LINK FT
You can access the FollowMyHealth Patient Portal offered by [facility] by registering at the following website: [email or web address]. By joining [facility]’s FollowMyHealth portal, you will also be able to view your health information using other applications (apps) compatible with our system.

## 2023-01-01 ENCOUNTER — EMERGENCY (EMERGENCY)
Facility: HOSPITAL | Age: 88
LOS: 1 days | Discharge: NURSING, REHAB OR LONG-TERM CARE | End: 2023-01-01
Attending: EMERGENCY MEDICINE
Payer: MEDICARE

## 2023-01-01 ENCOUNTER — EMERGENCY (EMERGENCY)
Facility: HOSPITAL | Age: 88
LOS: 1 days | Discharge: HOME | End: 2023-01-01
Attending: EMERGENCY MEDICINE
Payer: MEDICARE

## 2023-01-01 VITALS
OXYGEN SATURATION: 97 % | TEMPERATURE: 98 F | SYSTOLIC BLOOD PRESSURE: 113 MMHG | HEART RATE: 107 BPM | DIASTOLIC BLOOD PRESSURE: 76 MMHG | RESPIRATION RATE: 20 BRPM

## 2023-01-01 VITALS
TEMPERATURE: 99 F | DIASTOLIC BLOOD PRESSURE: 85 MMHG | SYSTOLIC BLOOD PRESSURE: 154 MMHG | RESPIRATION RATE: 18 BRPM | HEART RATE: 97 BPM | OXYGEN SATURATION: 96 %

## 2023-01-01 VITALS
OXYGEN SATURATION: 96 % | TEMPERATURE: 98 F | RESPIRATION RATE: 18 BRPM | HEART RATE: 88 BPM | DIASTOLIC BLOOD PRESSURE: 89 MMHG | SYSTOLIC BLOOD PRESSURE: 166 MMHG

## 2023-01-01 VITALS
DIASTOLIC BLOOD PRESSURE: 72 MMHG | OXYGEN SATURATION: 100 % | HEART RATE: 88 BPM | RESPIRATION RATE: 19 BRPM | SYSTOLIC BLOOD PRESSURE: 130 MMHG | TEMPERATURE: 98 F

## 2023-01-01 VITALS
SYSTOLIC BLOOD PRESSURE: 121 MMHG | TEMPERATURE: 98 F | DIASTOLIC BLOOD PRESSURE: 77 MMHG | RESPIRATION RATE: 20 BRPM | OXYGEN SATURATION: 96 % | HEART RATE: 69 BPM

## 2023-01-01 VITALS
SYSTOLIC BLOOD PRESSURE: 128 MMHG | TEMPERATURE: 98 F | DIASTOLIC BLOOD PRESSURE: 74 MMHG | HEART RATE: 57 BPM | OXYGEN SATURATION: 100 % | RESPIRATION RATE: 16 BRPM | HEIGHT: 59 IN | WEIGHT: 89.95 LBS

## 2023-01-01 LAB
-  AMIKACIN: SIGNIFICANT CHANGE UP
-  AMIKACIN: SIGNIFICANT CHANGE UP
-  AMPICILLIN: SIGNIFICANT CHANGE UP
-  AZTREONAM: SIGNIFICANT CHANGE UP
-  AZTREONAM: SIGNIFICANT CHANGE UP
-  CEFEPIME: SIGNIFICANT CHANGE UP
-  CEFEPIME: SIGNIFICANT CHANGE UP
-  CEFTAZIDIME: SIGNIFICANT CHANGE UP
-  CEFTAZIDIME: SIGNIFICANT CHANGE UP
-  CIPROFLOXACIN: SIGNIFICANT CHANGE UP
-  GENTAMICIN: SIGNIFICANT CHANGE UP
-  GENTAMICIN: SIGNIFICANT CHANGE UP
-  IMIPENEM: SIGNIFICANT CHANGE UP
-  IMIPENEM: SIGNIFICANT CHANGE UP
-  LEVOFLOXACIN: SIGNIFICANT CHANGE UP
-  MEROPENEM: SIGNIFICANT CHANGE UP
-  MEROPENEM: SIGNIFICANT CHANGE UP
-  NITROFURANTOIN: SIGNIFICANT CHANGE UP
-  PIPERACILLIN/TAZOBACTAM: SIGNIFICANT CHANGE UP
-  PIPERACILLIN/TAZOBACTAM: SIGNIFICANT CHANGE UP
-  TETRACYCLINE: SIGNIFICANT CHANGE UP
-  TOBRAMYCIN: SIGNIFICANT CHANGE UP
-  TOBRAMYCIN: SIGNIFICANT CHANGE UP
-  VANCOMYCIN: SIGNIFICANT CHANGE UP
ALBUMIN SERPL ELPH-MCNC: 3.6 G/DL — SIGNIFICANT CHANGE UP (ref 3.3–5)
ALBUMIN SERPL ELPH-MCNC: 3.9 G/DL — SIGNIFICANT CHANGE UP (ref 3.3–5)
ALBUMIN SERPL ELPH-MCNC: 4 G/DL — SIGNIFICANT CHANGE UP (ref 3.3–5)
ALBUMIN SERPL ELPH-MCNC: 4 G/DL — SIGNIFICANT CHANGE UP (ref 3.3–5)
ALP SERPL-CCNC: 67 U/L — SIGNIFICANT CHANGE UP (ref 40–120)
ALP SERPL-CCNC: 75 U/L — SIGNIFICANT CHANGE UP (ref 40–120)
ALP SERPL-CCNC: 75 U/L — SIGNIFICANT CHANGE UP (ref 40–120)
ALP SERPL-CCNC: 87 U/L — SIGNIFICANT CHANGE UP (ref 40–120)
ALT FLD-CCNC: 12 U/L — SIGNIFICANT CHANGE UP (ref 10–45)
ALT FLD-CCNC: 13 U/L — SIGNIFICANT CHANGE UP (ref 10–45)
ANION GAP SERPL CALC-SCNC: 10 MMOL/L — SIGNIFICANT CHANGE UP (ref 5–17)
ANION GAP SERPL CALC-SCNC: 10 MMOL/L — SIGNIFICANT CHANGE UP (ref 5–17)
ANION GAP SERPL CALC-SCNC: 13 MMOL/L — SIGNIFICANT CHANGE UP (ref 5–17)
ANION GAP SERPL CALC-SCNC: 15 MMOL/L — SIGNIFICANT CHANGE UP (ref 5–17)
APPEARANCE UR: CLEAR — SIGNIFICANT CHANGE UP
APTT BLD: 28.2 SEC — SIGNIFICANT CHANGE UP (ref 24.5–35.6)
AST SERPL-CCNC: 21 U/L — SIGNIFICANT CHANGE UP (ref 10–40)
AST SERPL-CCNC: 21 U/L — SIGNIFICANT CHANGE UP (ref 10–40)
AST SERPL-CCNC: 22 U/L — SIGNIFICANT CHANGE UP (ref 10–40)
AST SERPL-CCNC: 27 U/L — SIGNIFICANT CHANGE UP (ref 10–40)
BACTERIA # UR AUTO: ABNORMAL
BACTERIA # UR AUTO: NEGATIVE — SIGNIFICANT CHANGE UP
BACTERIA # UR AUTO: NEGATIVE — SIGNIFICANT CHANGE UP
BASOPHILS # BLD AUTO: 0.02 K/UL — SIGNIFICANT CHANGE UP (ref 0–0.2)
BASOPHILS # BLD AUTO: 0.07 K/UL — SIGNIFICANT CHANGE UP (ref 0–0.2)
BASOPHILS # BLD AUTO: 0.07 K/UL — SIGNIFICANT CHANGE UP (ref 0–0.2)
BASOPHILS # BLD AUTO: 0.08 K/UL — SIGNIFICANT CHANGE UP (ref 0–0.2)
BASOPHILS NFR BLD AUTO: 0.2 % — SIGNIFICANT CHANGE UP (ref 0–2)
BASOPHILS NFR BLD AUTO: 0.8 % — SIGNIFICANT CHANGE UP (ref 0–2)
BASOPHILS NFR BLD AUTO: 0.9 % — SIGNIFICANT CHANGE UP (ref 0–2)
BASOPHILS NFR BLD AUTO: 0.9 % — SIGNIFICANT CHANGE UP (ref 0–2)
BILIRUB SERPL-MCNC: 0.2 MG/DL — SIGNIFICANT CHANGE UP (ref 0.2–1.2)
BILIRUB SERPL-MCNC: 0.3 MG/DL — SIGNIFICANT CHANGE UP (ref 0.2–1.2)
BILIRUB UR-MCNC: NEGATIVE — SIGNIFICANT CHANGE UP
BUN SERPL-MCNC: 21 MG/DL — SIGNIFICANT CHANGE UP (ref 7–23)
BUN SERPL-MCNC: 28 MG/DL — HIGH (ref 7–23)
BUN SERPL-MCNC: 34 MG/DL — HIGH (ref 7–23)
BUN SERPL-MCNC: 34 MG/DL — HIGH (ref 7–23)
CALCIUM SERPL-MCNC: 9.2 MG/DL — SIGNIFICANT CHANGE UP (ref 8.4–10.5)
CALCIUM SERPL-MCNC: 9.5 MG/DL — SIGNIFICANT CHANGE UP (ref 8.4–10.5)
CALCIUM SERPL-MCNC: 9.9 MG/DL — SIGNIFICANT CHANGE UP (ref 8.4–10.5)
CALCIUM SERPL-MCNC: 9.9 MG/DL — SIGNIFICANT CHANGE UP (ref 8.4–10.5)
CHLORIDE SERPL-SCNC: 100 MMOL/L — SIGNIFICANT CHANGE UP (ref 96–108)
CHLORIDE SERPL-SCNC: 100 MMOL/L — SIGNIFICANT CHANGE UP (ref 96–108)
CHLORIDE SERPL-SCNC: 102 MMOL/L — SIGNIFICANT CHANGE UP (ref 96–108)
CHLORIDE SERPL-SCNC: 102 MMOL/L — SIGNIFICANT CHANGE UP (ref 96–108)
CO2 SERPL-SCNC: 21 MMOL/L — LOW (ref 22–31)
CO2 SERPL-SCNC: 22 MMOL/L — SIGNIFICANT CHANGE UP (ref 22–31)
CO2 SERPL-SCNC: 24 MMOL/L — SIGNIFICANT CHANGE UP (ref 22–31)
CO2 SERPL-SCNC: 24 MMOL/L — SIGNIFICANT CHANGE UP (ref 22–31)
COLOR SPEC: YELLOW — SIGNIFICANT CHANGE UP
CREAT SERPL-MCNC: 0.85 MG/DL — SIGNIFICANT CHANGE UP (ref 0.5–1.3)
CREAT SERPL-MCNC: 0.88 MG/DL — SIGNIFICANT CHANGE UP (ref 0.5–1.3)
CREAT SERPL-MCNC: 1.01 MG/DL — SIGNIFICANT CHANGE UP (ref 0.5–1.3)
CREAT SERPL-MCNC: 1.01 MG/DL — SIGNIFICANT CHANGE UP (ref 0.5–1.3)
CULTURE RESULTS: SIGNIFICANT CHANGE UP
DIFF PNL FLD: ABNORMAL
DIFF PNL FLD: ABNORMAL
DIFF PNL FLD: NEGATIVE — SIGNIFICANT CHANGE UP
DIFF PNL FLD: NEGATIVE — SIGNIFICANT CHANGE UP
EGFR: 52 ML/MIN/1.73M2 — LOW
EGFR: 52 ML/MIN/1.73M2 — LOW
EGFR: 61 ML/MIN/1.73M2 — SIGNIFICANT CHANGE UP
EGFR: 63 ML/MIN/1.73M2 — SIGNIFICANT CHANGE UP
EOSINOPHIL # BLD AUTO: 0.12 K/UL — SIGNIFICANT CHANGE UP (ref 0–0.5)
EOSINOPHIL # BLD AUTO: 0.16 K/UL — SIGNIFICANT CHANGE UP (ref 0–0.5)
EOSINOPHIL # BLD AUTO: 0.25 K/UL — SIGNIFICANT CHANGE UP (ref 0–0.5)
EOSINOPHIL # BLD AUTO: 0.25 K/UL — SIGNIFICANT CHANGE UP (ref 0–0.5)
EOSINOPHIL NFR BLD AUTO: 1.5 % — SIGNIFICANT CHANGE UP (ref 0–6)
EOSINOPHIL NFR BLD AUTO: 1.6 % — SIGNIFICANT CHANGE UP (ref 0–6)
EOSINOPHIL NFR BLD AUTO: 3.1 % — SIGNIFICANT CHANGE UP (ref 0–6)
EOSINOPHIL NFR BLD AUTO: 3.1 % — SIGNIFICANT CHANGE UP (ref 0–6)
EPI CELLS # UR: 1 /HPF — SIGNIFICANT CHANGE UP
EPI CELLS # UR: 1 — SIGNIFICANT CHANGE UP
EPI CELLS # UR: 1 — SIGNIFICANT CHANGE UP
FLUAV AG NPH QL: SIGNIFICANT CHANGE UP
FLUAV AG NPH QL: SIGNIFICANT CHANGE UP
FLUBV AG NPH QL: SIGNIFICANT CHANGE UP
FLUBV AG NPH QL: SIGNIFICANT CHANGE UP
GAS PNL BLDV: SIGNIFICANT CHANGE UP
GLUCOSE SERPL-MCNC: 100 MG/DL — HIGH (ref 70–99)
GLUCOSE SERPL-MCNC: 100 MG/DL — HIGH (ref 70–99)
GLUCOSE SERPL-MCNC: 116 MG/DL — HIGH (ref 70–99)
GLUCOSE SERPL-MCNC: 94 MG/DL — SIGNIFICANT CHANGE UP (ref 70–99)
GLUCOSE UR QL: NEGATIVE — SIGNIFICANT CHANGE UP
HCT VFR BLD CALC: 33.3 % — LOW (ref 34.5–45)
HCT VFR BLD CALC: 35.5 % — SIGNIFICANT CHANGE UP (ref 34.5–45)
HCT VFR BLD CALC: 35.5 % — SIGNIFICANT CHANGE UP (ref 34.5–45)
HCT VFR BLD CALC: 36.5 % — SIGNIFICANT CHANGE UP (ref 34.5–45)
HGB BLD-MCNC: 10.7 G/DL — LOW (ref 11.5–15.5)
HGB BLD-MCNC: 11.4 G/DL — LOW (ref 11.5–15.5)
HGB BLD-MCNC: 11.4 G/DL — LOW (ref 11.5–15.5)
HGB BLD-MCNC: 11.7 G/DL — SIGNIFICANT CHANGE UP (ref 11.5–15.5)
HYALINE CASTS # UR AUTO: 0 /LPF — SIGNIFICANT CHANGE UP (ref 0–2)
HYALINE CASTS # UR AUTO: NEGATIVE /LPF — SIGNIFICANT CHANGE UP
HYALINE CASTS # UR AUTO: NEGATIVE /LPF — SIGNIFICANT CHANGE UP
IMM GRANULOCYTES NFR BLD AUTO: 0.2 % — SIGNIFICANT CHANGE UP (ref 0–0.9)
IMM GRANULOCYTES NFR BLD AUTO: 0.2 % — SIGNIFICANT CHANGE UP (ref 0–0.9)
IMM GRANULOCYTES NFR BLD AUTO: 0.4 % — SIGNIFICANT CHANGE UP (ref 0–0.9)
IMM GRANULOCYTES NFR BLD AUTO: 0.5 % — SIGNIFICANT CHANGE UP (ref 0–0.9)
INR BLD: 0.98 RATIO — SIGNIFICANT CHANGE UP (ref 0.85–1.18)
KETONES UR-MCNC: NEGATIVE — SIGNIFICANT CHANGE UP
LEUKOCYTE ESTERASE UR-ACNC: ABNORMAL
LYMPHOCYTES # BLD AUTO: 2.05 K/UL — SIGNIFICANT CHANGE UP (ref 1–3.3)
LYMPHOCYTES # BLD AUTO: 2.1 K/UL — SIGNIFICANT CHANGE UP (ref 1–3.3)
LYMPHOCYTES # BLD AUTO: 2.48 K/UL — SIGNIFICANT CHANGE UP (ref 1–3.3)
LYMPHOCYTES # BLD AUTO: 2.48 K/UL — SIGNIFICANT CHANGE UP (ref 1–3.3)
LYMPHOCYTES # BLD AUTO: 21 % — SIGNIFICANT CHANGE UP (ref 13–44)
LYMPHOCYTES # BLD AUTO: 25.2 % — SIGNIFICANT CHANGE UP (ref 13–44)
LYMPHOCYTES # BLD AUTO: 30.7 % — SIGNIFICANT CHANGE UP (ref 13–44)
LYMPHOCYTES # BLD AUTO: 30.7 % — SIGNIFICANT CHANGE UP (ref 13–44)
MCHC RBC-ENTMCNC: 28 PG — SIGNIFICANT CHANGE UP (ref 27–34)
MCHC RBC-ENTMCNC: 28.1 PG — SIGNIFICANT CHANGE UP (ref 27–34)
MCHC RBC-ENTMCNC: 28.1 PG — SIGNIFICANT CHANGE UP (ref 27–34)
MCHC RBC-ENTMCNC: 28.3 PG — SIGNIFICANT CHANGE UP (ref 27–34)
MCHC RBC-ENTMCNC: 32.1 GM/DL — SIGNIFICANT CHANGE UP (ref 32–36)
MCV RBC AUTO: 87.2 FL — SIGNIFICANT CHANGE UP (ref 80–100)
MCV RBC AUTO: 87.7 FL — SIGNIFICANT CHANGE UP (ref 80–100)
MCV RBC AUTO: 87.7 FL — SIGNIFICANT CHANGE UP (ref 80–100)
MCV RBC AUTO: 88.4 FL — SIGNIFICANT CHANGE UP (ref 80–100)
METHOD TYPE: SIGNIFICANT CHANGE UP
MONOCYTES # BLD AUTO: 0.47 K/UL — SIGNIFICANT CHANGE UP (ref 0–0.9)
MONOCYTES # BLD AUTO: 0.47 K/UL — SIGNIFICANT CHANGE UP (ref 0–0.9)
MONOCYTES # BLD AUTO: 0.69 K/UL — SIGNIFICANT CHANGE UP (ref 0–0.9)
MONOCYTES # BLD AUTO: 0.75 K/UL — SIGNIFICANT CHANGE UP (ref 0–0.9)
MONOCYTES NFR BLD AUTO: 5.8 % — SIGNIFICANT CHANGE UP (ref 2–14)
MONOCYTES NFR BLD AUTO: 5.8 % — SIGNIFICANT CHANGE UP (ref 2–14)
MONOCYTES NFR BLD AUTO: 7.5 % — SIGNIFICANT CHANGE UP (ref 2–14)
MONOCYTES NFR BLD AUTO: 8.5 % — SIGNIFICANT CHANGE UP (ref 2–14)
NEUTROPHILS # BLD AUTO: 4.8 K/UL — SIGNIFICANT CHANGE UP (ref 1.8–7.4)
NEUTROPHILS # BLD AUTO: 4.8 K/UL — SIGNIFICANT CHANGE UP (ref 1.8–7.4)
NEUTROPHILS # BLD AUTO: 5.22 K/UL — SIGNIFICANT CHANGE UP (ref 1.8–7.4)
NEUTROPHILS # BLD AUTO: 6.87 K/UL — SIGNIFICANT CHANGE UP (ref 1.8–7.4)
NEUTROPHILS NFR BLD AUTO: 59.3 % — SIGNIFICANT CHANGE UP (ref 43–77)
NEUTROPHILS NFR BLD AUTO: 59.3 % — SIGNIFICANT CHANGE UP (ref 43–77)
NEUTROPHILS NFR BLD AUTO: 64.1 % — SIGNIFICANT CHANGE UP (ref 43–77)
NEUTROPHILS NFR BLD AUTO: 68.7 % — SIGNIFICANT CHANGE UP (ref 43–77)
NITRITE UR-MCNC: NEGATIVE — SIGNIFICANT CHANGE UP
NRBC # BLD: 0 /100 WBCS — SIGNIFICANT CHANGE UP (ref 0–0)
ORGANISM # SPEC MICROSCOPIC CNT: SIGNIFICANT CHANGE UP
PH UR: 6.5 — SIGNIFICANT CHANGE UP (ref 5–8)
PH UR: 7 — SIGNIFICANT CHANGE UP (ref 5–8)
PH UR: 7.5 — SIGNIFICANT CHANGE UP (ref 5–8)
PH UR: 7.5 — SIGNIFICANT CHANGE UP (ref 5–8)
PLATELET # BLD AUTO: 219 K/UL — SIGNIFICANT CHANGE UP (ref 150–400)
PLATELET # BLD AUTO: 243 K/UL — SIGNIFICANT CHANGE UP (ref 150–400)
PLATELET # BLD AUTO: 243 K/UL — SIGNIFICANT CHANGE UP (ref 150–400)
PLATELET # BLD AUTO: 286 K/UL — SIGNIFICANT CHANGE UP (ref 150–400)
POTASSIUM SERPL-MCNC: 4 MMOL/L — SIGNIFICANT CHANGE UP (ref 3.5–5.3)
POTASSIUM SERPL-MCNC: 4.3 MMOL/L — SIGNIFICANT CHANGE UP (ref 3.5–5.3)
POTASSIUM SERPL-MCNC: 5.4 MMOL/L — HIGH (ref 3.5–5.3)
POTASSIUM SERPL-MCNC: 5.4 MMOL/L — HIGH (ref 3.5–5.3)
POTASSIUM SERPL-SCNC: 4 MMOL/L — SIGNIFICANT CHANGE UP (ref 3.5–5.3)
POTASSIUM SERPL-SCNC: 4.3 MMOL/L — SIGNIFICANT CHANGE UP (ref 3.5–5.3)
POTASSIUM SERPL-SCNC: 5.4 MMOL/L — HIGH (ref 3.5–5.3)
POTASSIUM SERPL-SCNC: 5.4 MMOL/L — HIGH (ref 3.5–5.3)
PROT SERPL-MCNC: 7.3 G/DL — SIGNIFICANT CHANGE UP (ref 6–8.3)
PROT SERPL-MCNC: 7.4 G/DL — SIGNIFICANT CHANGE UP (ref 6–8.3)
PROT UR-MCNC: ABNORMAL
PROT UR-MCNC: NEGATIVE — SIGNIFICANT CHANGE UP
PROT UR-MCNC: NEGATIVE — SIGNIFICANT CHANGE UP
PROT UR-MCNC: SIGNIFICANT CHANGE UP
PROTHROM AB SERPL-ACNC: 10.8 SEC — SIGNIFICANT CHANGE UP (ref 9.5–13)
RAPID RVP RESULT: DETECTED
RBC # BLD: 3.82 M/UL — SIGNIFICANT CHANGE UP (ref 3.8–5.2)
RBC # BLD: 4.05 M/UL — SIGNIFICANT CHANGE UP (ref 3.8–5.2)
RBC # BLD: 4.05 M/UL — SIGNIFICANT CHANGE UP (ref 3.8–5.2)
RBC # BLD: 4.13 M/UL — SIGNIFICANT CHANGE UP (ref 3.8–5.2)
RBC # FLD: 12.5 % — SIGNIFICANT CHANGE UP (ref 10.3–14.5)
RBC # FLD: 13.3 % — SIGNIFICANT CHANGE UP (ref 10.3–14.5)
RBC # FLD: 13.6 % — SIGNIFICANT CHANGE UP (ref 10.3–14.5)
RBC # FLD: 13.6 % — SIGNIFICANT CHANGE UP (ref 10.3–14.5)
RBC CASTS # UR COMP ASSIST: 3 /HPF — SIGNIFICANT CHANGE UP (ref 0–4)
RSV RNA NPH QL NAA+NON-PROBE: SIGNIFICANT CHANGE UP
RSV RNA NPH QL NAA+NON-PROBE: SIGNIFICANT CHANGE UP
SARS-COV-2 RNA SPEC QL NAA+PROBE: DETECTED
SODIUM SERPL-SCNC: 134 MMOL/L — LOW (ref 135–145)
SODIUM SERPL-SCNC: 136 MMOL/L — SIGNIFICANT CHANGE UP (ref 135–145)
SODIUM SERPL-SCNC: 136 MMOL/L — SIGNIFICANT CHANGE UP (ref 135–145)
SODIUM SERPL-SCNC: 137 MMOL/L — SIGNIFICANT CHANGE UP (ref 135–145)
SP GR SPEC: 1.02 — SIGNIFICANT CHANGE UP (ref 1.01–1.02)
SPECIMEN SOURCE: SIGNIFICANT CHANGE UP
TROPONIN T, HIGH SENSITIVITY RESULT: 23 NG/L — SIGNIFICANT CHANGE UP (ref 0–51)
UROBILINOGEN FLD QL: NEGATIVE — SIGNIFICANT CHANGE UP
UROBILINOGEN FLD QL: NEGATIVE — SIGNIFICANT CHANGE UP
UROBILINOGEN FLD QL: SIGNIFICANT CHANGE UP
UROBILINOGEN FLD QL: SIGNIFICANT CHANGE UP
WBC # BLD: 10 K/UL — SIGNIFICANT CHANGE UP (ref 3.8–10.5)
WBC # BLD: 8.09 K/UL — SIGNIFICANT CHANGE UP (ref 3.8–10.5)
WBC # BLD: 8.09 K/UL — SIGNIFICANT CHANGE UP (ref 3.8–10.5)
WBC # BLD: 8.14 K/UL — SIGNIFICANT CHANGE UP (ref 3.8–10.5)
WBC # FLD AUTO: 10 K/UL — SIGNIFICANT CHANGE UP (ref 3.8–10.5)
WBC # FLD AUTO: 8.09 K/UL — SIGNIFICANT CHANGE UP (ref 3.8–10.5)
WBC # FLD AUTO: 8.09 K/UL — SIGNIFICANT CHANGE UP (ref 3.8–10.5)
WBC # FLD AUTO: 8.14 K/UL — SIGNIFICANT CHANGE UP (ref 3.8–10.5)
WBC UR QL: 1 /HPF — SIGNIFICANT CHANGE UP (ref 0–5)
WBC UR QL: 1 /HPF — SIGNIFICANT CHANGE UP (ref 0–5)
WBC UR QL: 5 /HPF — SIGNIFICANT CHANGE UP (ref 0–5)

## 2023-01-01 PROCEDURE — 93005 ELECTROCARDIOGRAM TRACING: CPT

## 2023-01-01 PROCEDURE — 70498 CT ANGIOGRAPHY NECK: CPT | Mod: 26,MA

## 2023-01-01 PROCEDURE — 72170 X-RAY EXAM OF PELVIS: CPT

## 2023-01-01 PROCEDURE — 85610 PROTHROMBIN TIME: CPT

## 2023-01-01 PROCEDURE — 99285 EMERGENCY DEPT VISIT HI MDM: CPT | Mod: GC

## 2023-01-01 PROCEDURE — 81001 URINALYSIS AUTO W/SCOPE: CPT

## 2023-01-01 PROCEDURE — 70498 CT ANGIOGRAPHY NECK: CPT | Mod: MA

## 2023-01-01 PROCEDURE — 99285 EMERGENCY DEPT VISIT HI MDM: CPT | Mod: 25

## 2023-01-01 PROCEDURE — 71045 X-RAY EXAM CHEST 1 VIEW: CPT

## 2023-01-01 PROCEDURE — 80053 COMPREHEN METABOLIC PANEL: CPT

## 2023-01-01 PROCEDURE — 85025 COMPLETE CBC W/AUTO DIFF WBC: CPT

## 2023-01-01 PROCEDURE — 83605 ASSAY OF LACTIC ACID: CPT

## 2023-01-01 PROCEDURE — 36415 COLL VENOUS BLD VENIPUNCTURE: CPT

## 2023-01-01 PROCEDURE — 70450 CT HEAD/BRAIN W/O DYE: CPT | Mod: MA

## 2023-01-01 PROCEDURE — 82435 ASSAY OF BLOOD CHLORIDE: CPT

## 2023-01-01 PROCEDURE — 82330 ASSAY OF CALCIUM: CPT

## 2023-01-01 PROCEDURE — 87637 SARSCOV2&INF A&B&RSV AMP PRB: CPT

## 2023-01-01 PROCEDURE — 71045 X-RAY EXAM CHEST 1 VIEW: CPT | Mod: 26

## 2023-01-01 PROCEDURE — 84295 ASSAY OF SERUM SODIUM: CPT

## 2023-01-01 PROCEDURE — 82962 GLUCOSE BLOOD TEST: CPT

## 2023-01-01 PROCEDURE — 82803 BLOOD GASES ANY COMBINATION: CPT

## 2023-01-01 PROCEDURE — 82947 ASSAY GLUCOSE BLOOD QUANT: CPT

## 2023-01-01 PROCEDURE — 84132 ASSAY OF SERUM POTASSIUM: CPT

## 2023-01-01 PROCEDURE — 70450 CT HEAD/BRAIN W/O DYE: CPT | Mod: 26,MA

## 2023-01-01 PROCEDURE — 87077 CULTURE AEROBIC IDENTIFY: CPT

## 2023-01-01 PROCEDURE — 85014 HEMATOCRIT: CPT

## 2023-01-01 PROCEDURE — 85018 HEMOGLOBIN: CPT

## 2023-01-01 PROCEDURE — 72170 X-RAY EXAM OF PELVIS: CPT | Mod: 26

## 2023-01-01 PROCEDURE — 70496 CT ANGIOGRAPHY HEAD: CPT | Mod: 26,MA

## 2023-01-01 PROCEDURE — 0225U NFCT DS DNA&RNA 21 SARSCOV2: CPT

## 2023-01-01 PROCEDURE — 87186 SC STD MICRODIL/AGAR DIL: CPT

## 2023-01-01 PROCEDURE — 85730 THROMBOPLASTIN TIME PARTIAL: CPT

## 2023-01-01 PROCEDURE — 70496 CT ANGIOGRAPHY HEAD: CPT | Mod: MA

## 2023-01-01 PROCEDURE — 70450 CT HEAD/BRAIN W/O DYE: CPT | Mod: 26,XU,MA

## 2023-01-01 PROCEDURE — 87086 URINE CULTURE/COLONY COUNT: CPT

## 2023-01-01 PROCEDURE — 87040 BLOOD CULTURE FOR BACTERIA: CPT

## 2023-01-01 PROCEDURE — 84484 ASSAY OF TROPONIN QUANT: CPT

## 2023-01-01 RX ORDER — ACETAMINOPHEN 500 MG
650 TABLET ORAL ONCE
Refills: 0 | Status: COMPLETED | OUTPATIENT
Start: 2023-01-01 | End: 2023-01-01

## 2023-01-01 RX ORDER — SODIUM CHLORIDE 9 MG/ML
500 INJECTION INTRAMUSCULAR; INTRAVENOUS; SUBCUTANEOUS ONCE
Refills: 0 | Status: COMPLETED | OUTPATIENT
Start: 2023-01-01 | End: 2023-01-01

## 2023-01-01 RX ORDER — CIPROFLOXACIN LACTATE 400MG/40ML
500 VIAL (ML) INTRAVENOUS ONCE
Refills: 0 | Status: DISCONTINUED | OUTPATIENT
Start: 2023-01-01 | End: 2023-01-01

## 2023-01-01 RX ORDER — CIPROFLOXACIN LACTATE 400MG/40ML
500 VIAL (ML) INTRAVENOUS ONCE
Refills: 0 | Status: COMPLETED | OUTPATIENT
Start: 2023-01-01 | End: 2023-01-01

## 2023-01-01 RX ORDER — CIPROFLOXACIN LACTATE 400MG/40ML
5 VIAL (ML) INTRAVENOUS
Qty: 1 | Refills: 0
Start: 2023-01-01 | End: 2023-01-01

## 2023-01-01 RX ORDER — SODIUM CHLORIDE 9 MG/ML
1000 INJECTION, SOLUTION INTRAVENOUS ONCE
Refills: 0 | Status: DISCONTINUED | OUTPATIENT
Start: 2023-01-01 | End: 2023-01-01

## 2023-01-01 RX ORDER — SODIUM CHLORIDE 9 MG/ML
500 INJECTION, SOLUTION INTRAVENOUS ONCE
Refills: 0 | Status: COMPLETED | OUTPATIENT
Start: 2023-01-01 | End: 2023-01-01

## 2023-01-01 RX ORDER — CIPROFLOXACIN LACTATE 400MG/40ML
1 VIAL (ML) INTRAVENOUS
Qty: 10 | Refills: 0
Start: 2023-01-01 | End: 2023-01-01

## 2023-01-01 RX ORDER — SODIUM ZIRCONIUM CYCLOSILICATE 10 G/10G
5 POWDER, FOR SUSPENSION ORAL ONCE
Refills: 0 | Status: COMPLETED | OUTPATIENT
Start: 2023-01-01 | End: 2023-01-01

## 2023-01-01 RX ORDER — HALOPERIDOL DECANOATE 100 MG/ML
0.5 INJECTION INTRAMUSCULAR ONCE
Refills: 0 | Status: DISCONTINUED | OUTPATIENT
Start: 2023-01-01 | End: 2023-01-01

## 2023-01-01 RX ADMIN — SODIUM CHLORIDE 500 MILLILITER(S): 9 INJECTION, SOLUTION INTRAVENOUS at 13:22

## 2023-01-01 RX ADMIN — SODIUM CHLORIDE 500 MILLILITER(S): 9 INJECTION INTRAMUSCULAR; INTRAVENOUS; SUBCUTANEOUS at 14:32

## 2023-01-01 RX ADMIN — Medication 500 MILLIGRAM(S): at 15:31

## 2023-01-01 RX ADMIN — SODIUM CHLORIDE 500 MILLILITER(S): 9 INJECTION INTRAMUSCULAR; INTRAVENOUS; SUBCUTANEOUS at 14:33

## 2023-01-01 RX ADMIN — SODIUM ZIRCONIUM CYCLOSILICATE 5 GRAM(S): 10 POWDER, FOR SUSPENSION ORAL at 14:38

## 2023-06-09 NOTE — ED PROVIDER NOTE - ATTENDING CONTRIBUTION TO CARE
89-year-old female history of hypertension hypothyroidism breast cancer status post mastectomy dementia and a pacemaker living at the nursing home was brought in after an unwitnessed fall currently has no symptoms denies loss of consciousness and is not sure about head trauma denies chest pain shortness of breath abdominal pain nausea vomiting fever chills or recent infectious symptoms  On physical examination well-appearing a c-collar was applied after examination was able to clear and remove no tenderness over back and torso and no midline tenderness no obvious traumatic injury to the head neurologically grossly intact clear lungs S1-S2 soft nontender abdomen stable pelvis  Concern for traumatic brain injury we will do labs x-rays and CT of the head  Signed out pending head CT results as well as labs

## 2023-06-09 NOTE — ED PROVIDER NOTE - PATIENT PORTAL LINK FT
You can access the FollowMyHealth Patient Portal offered by [facility] by registering at the following website: [email or web address]. By joining [facility]’s FollowMyHealth portal, you will also be able to view your health information using other applications (apps) compatible with our system.

## 2023-06-09 NOTE — ED PROVIDER NOTE - OBJECTIVE STATEMENT
Patient is a 89-year-old female with a history of hypertension, hypothyroid, breast cancer status postmastectomy, dementia, pacemaker who presents to the ED to ED after she fell out of her wheelchair today at the nursing home.  Patient had unwitnessed fall but staff heard her fall and immediately came to her aid.  No LOC at that time, unsure of head trauma.  Patient currently not complaining of any pain.  Denies any chest pain, shortness of breath, nausea, vomiting, fevers, chills.

## 2023-06-09 NOTE — ED ADULT NURSE NOTE - NSFALLHARMRISKINTERV_ED_ALL_ED
Assistance OOB with selected safe patient handling equipment if applicable/Assistance with ambulation/Communicate risk of Fall with Harm to all staff, patient, and family/Monitor gait and stability/Monitor for mental status changes and reorient to person, place, and time, as needed/Move patient closer to nursing station/within visual sight of ED staff/Provide patient with walking aids/Provide visual cue: red socks, yellow wristband, yellow gown, etc/Reinforce activity limits and safety measures with patient and family/Toileting schedule using arm’s reach rule for commode and bathroom/Use of alarms - bed, stretcher, chair and/or video monitoring/Bed in lowest position, wheels locked, appropriate side rails in place/Call bell, personal items and telephone in reach/Instruct patient to call for assistance before getting out of bed/chair/stretcher/Non-slip footwear applied when patient is off stretcher/[place] to call system/Physically safe environment - no spills, clutter or unnecessary equipment/Purposeful Proactive Rounding/Room/bathroom lighting operational, light cord in reach

## 2023-06-09 NOTE — ED ADULT NURSE REASSESSMENT NOTE - NS ED NURSE REASSESS COMMENT FT1
pt seen attempting to get out of bed by md with pt placed on constant obs for safety reasons pt remains alert and verbal denies any pain talking quite a lot about her mother and family pt pending nonemergent transport to assisted living
pt received back to gold from radiology pt alert verbal oriented x 1 knows in hospital pt denies any pain states she feels off balance pt states she needs to call her mother? pt placed in fall risk gown motor sensory intact to extremities pt denies any headache pending results

## 2023-06-09 NOTE — ED PROVIDER NOTE - NSFOLLOWUPINSTRUCTIONS_ED_ALL_ED_FT
Your CT head, chest x-ray and pelvis x-ray were negative for fractures or any injuries from your fall. Your blood work was also normal. Please follow up with your Primary Care provider in 1 week. Please return to the Emergency Department if you develop any new or concerning symptoms such as chest pain, shortness of breath, or another fall.

## 2023-06-09 NOTE — ED ADULT NURSE NOTE - OBJECTIVE STATEMENT
Pt is a 89 y/o female with PMH dementia, HTN, hypothyroidism, breast cancer presenting to the ED from [facility] in [place] by EMS s/p fall. Per EMS, pt had single unwitnessed mechanical fall, fall was heard by staff and found patient right away on the ground. Unknown LOC and blood thinner use per EMS. Pt arrived to [facility] in A&OX1 to person in C-collar, speaking clearly, following commands, sensory/motor function intact. Pt endorsing mild lower back pain, denies chest pain, SOB, chills, weakness, headache, changes in vision, dizziness.

## 2023-06-09 NOTE — ED PROVIDER NOTE - PHYSICAL EXAMINATION
Const:   Thin elderly 80  Eyes: no conjunctival injection, and symmetrical lids.  HEENT: Head NCAT, no lesions. Atraumatic external nose and ears. Moist MM.  Neck: Symmetric, trachea midline.   RESP: Unlabored respiratory effort.   GI: Nontender/Nondistended,  MSK: Normocephalic/Atraumatic, Lower Extremities w/o calf tenderness or edema b/l.  no chest wall tenderness.  Full ROM of bilateral hips and knees.  Full ROM of bilateral arms.  Skin: Warm, dry and intact.   Neuro: CNs II-XII grossly intact. Motor & Sensation grossly intact.  Psych: Awake, Alert, & Oriented (AAO) x3. Appropriate mood and affect.

## 2023-06-09 NOTE — ED PROVIDER NOTE - CLINICAL SUMMARY MEDICAL DECISION MAKING FREE TEXT BOX
89-year-old female presenting after a unwitnessed fall.  We will get scans to evaluate for any fractures versus head bleed.

## 2023-06-09 NOTE — ED PROVIDER NOTE - PROGRESS NOTE DETAILS
Obi THORNTON, EM/IM PGY-2: Pt signed out to me at 7am. 89-year-old female with extensive past medical history including dementia, wheelchair-bound, presented after a fall.  Last set of vital signs at 0605 significant for heart rate 107, otherwise within normal limits. CBC and CMP unremarkable. Pending CT head, x-ray of the chest, x-ray of the pelvis.  Patient received Tylenol for pain. On reassessment pt says she is asymptomatic, is A&Ox1 and does not know why she is here, otherwise appears well without tenderness or bruising on the body. Attending MD Morris: Assumed care of patient in signout.  Work-up in emergency department revealed normal chemistry and normal CBC urinalysis is not consistent with UTI.  CT head negative for hemorrhage or acute findings.  Screening chest x-ray and pelvis x-ray without fracture.  Patient stable for discharge back to assisted living facility.  No acute emergent process at this time.

## 2023-06-12 NOTE — ED POST DISCHARGE NOTE - DETAILS
6/13/23 Jessica ABRAHAM- called [facility], left message at nurses station after 2 attempts to speak with RN caring for patient. left v/m to call back admin line. 6/13/23 Jessica ABRAHAM- attempted [facility] again and got through to pt's RN. spoke with RN jovani, informed of +Urine culture results. per our conversation, pt is more fatigued than normal. will treat with cipro x 5 days. rx sent over. urine culture result faxed by rep Chang. strict return precautions given to RN. all questions/concerns addressed.

## 2023-06-12 NOTE — ED POST DISCHARGE NOTE - RESULT SUMMARY
UCX prelim w/ pseudomonas, appears pt not on abx. If final culture unchanged would speak to pt nursing facility and likely recommend starting [facility] - Saundra Alves PA-C

## 2023-09-13 NOTE — ED PROVIDER NOTE - CARE PLAN
Principal Discharge DX:	Confusion   1 Principal Discharge DX:	Confusion  Secondary Diagnosis:	Acute UTI

## 2023-09-13 NOTE — ED PROVIDER NOTE - PATIENT PORTAL LINK FT
You can access the FollowMyHealth Patient Portal offered by [facility] by registering at the following website: [email or web address]. By joining [facility]’s FollowMyHealth portal, you will also be able to view your health information using other applications (apps) compatible with our system.

## 2023-09-13 NOTE — ED ADULT NURSE NOTE - NSFALLHARMRISKINTERV_ED_ALL_ED
Assistance OOB with selected safe patient handling equipment if applicable/Communicate risk of Fall with Harm to all staff, patient, and family/Monitor gait and stability/Provide patient with walking aids/Provide visual cue: red socks, yellow wristband, yellow gown, etc/Reinforce activity limits and safety measures with patient and family/Bed in lowest position, wheels locked, appropriate side rails in place/Call bell, personal items and telephone in reach/Instruct patient to call for assistance before getting out of bed/chair/stretcher/Non-slip footwear applied when patient is off stretcher/[place] to call system/Physically safe environment - no spills, clutter or unnecessary equipment/Purposeful Proactive Rounding/Room/bathroom lighting operational, light cord in reach

## 2023-09-13 NOTE — ED PROVIDER NOTE - PHYSICAL EXAMINATION
Physical Exam:  General: NAD, Conversive  Eyes: EOMI, Conjunctia and sclera clear  Neck: No JVD  Lungs: Clear to auscultation bilaterally, no wheeze, no rhonchi  Heart: Normal S1, S2, no murmurs  Abdomen: Soft, nontender, nondistended, no CVA tenderness  Extremities: 2+ peripheral pulses, no edema  Psych: AAO X3  Neurologic: Non-focal, no weakness, no focal deficit, no pronator drift

## 2023-09-13 NOTE — CONSULT NOTE PEDS - ASSESSMENT
Neurology - Consult Note - 09-13-23  -  Gissel Anglin MD  PGY-3 Neurology  -    Name: JUSTINE OROURKE; 89y (04-Apr-1929)    Reason for Admission:     Chief Complaint:  aphasia  HPI: 89y F w/ pmh of hypertension, dementia, hypercholesterolemia, pacemaker implantation presenting from residential facility w/ aphasia. Per chart patient was last known well at 11am, at which point she had aphasia for 30 mins, at which point she returned to baseline. She had no other focal neurologic deficits at the time, or any abnormal movements. Came to ED as code stroke, NIHSS on arrival 0, MRS 5.          Review of Systems:     CONSTITUTIONAL: No fevers or chills  EYES/ENT: No visual changes  NECK: No pain or stiffness  NEUROLOGICAL: +As stated in HPI above  All other review of systems is negative unless indicated above.    Allergies:  No Known Allergies      PMHx:   No pertinent past medical history      PFHx:     PSuHx:       Medications:  MEDICATIONS  (STANDING):  ciprofloxacin     Tablet 500 milliGRAM(s) Oral Once    MEDICATIONS  (PRN):      Vitals:  T(C): 36.5 (09-13-23 @ 12:20), Max: 36.5 (09-13-23 @ 12:20)  HR: 69 (09-13-23 @ 12:20) (69 - 69)  BP: 121/77 (09-13-23 @ 12:20) (121/77 - 121/77)  RR: 20 (09-13-23 @ 12:20) (20 - 20)  SpO2: 96% (09-13-23 @ 12:20) (96% - 96%)    Physical Examination:               Labs:                        10.7   8.14  )-----------( 219      ( 13 Sep 2023 12:32 )             33.3     09-13    134<L>  |  100  |  28<H>  ----------------------------<  116<H>  4.3   |  21<L>  |  0.85    Ca    9.2      13 Sep 2023 12:32    TPro  7.4  /  Alb  3.6  /  TBili  0.2  /  DBili  x   /  AST  27  /  ALT  12  /  AlkPhos  67  09-13    CAPILLARY BLOOD GLUCOSE  114 (13 Sep 2023 12:38)      POCT Blood Glucose.: 114 mg/dL (13 Sep 2023 12:30)    LIVER FUNCTIONS - ( 13 Sep 2023 12:32 )  Alb: 3.6 g/dL / Pro: 7.4 g/dL / ALK PHOS: 67 U/L / ALT: 12 U/L / AST: 27 U/L / GGT: x             PT/INR - ( 13 Sep 2023 12:32 )   PT: 10.8 sec;   INR: 0.98 ratio         PTT - ( 13 Sep 2023 12:32 )  PTT:28.2 sec  CSF:                  Radiology:     89y F w/ pmh of htn, hypercholesterolemia, pacemaker implantation presenting w/ transient aphasia lasting 30 mins now back to baseline. Patient has had stable imaging, and normal cbc and electrolytes, w/ UA concerning for possible UTI. Presentation most likely consistent w/ AMS 2/2 UTI. Recommend treating per primary team.     Impression: 89y F presenting w/ transient aphasia 2/2 to altered mental status in the setting of likely UTI.     PLAN:  [ ] UTI treatment per primary team  [ ] monitor for 24-48h for no further episodes.   [ ] rest of care per primary team.       ***Note not finalized until attending attestation and cosign.  89y F w/ pmh of htn, hypercholesterolemia, pacemaker implantation presenting w/ transient aphasia lasting 30 mins now back to baseline. Patient has had stable imaging, and normal cbc and electrolytes, w/ UA concerning for possible UTI. Presentation most likely consistent w/ AMS 2/2 UTI. Recommend treating per primary team.     Impression: 89y F presenting w/ transient aphasia 2/2 to altered mental status in the setting of likely UTI.     PLAN:  [ ] UTI treatment per primary team  [ ] rest of care per primary team.       ***Note not finalized until attending attestation and cosign.

## 2023-09-13 NOTE — CONSULT NOTE PEDS - SUBJECTIVE AND OBJECTIVE BOX
Neurology - Consult Note - 09-13-23  -  Gissel Anglin MD  PGY-3 Neurology  -    Name: JUSTINE OROURKE; 89y (04-Apr-1929)    Reason for Admission:     Chief Complaint:  aphasia  HPI: 89y F w/ pmh of hypertension, dementia, hypercholesterolemia, pacemaker implantation presenting from residential facility w/ aphasia. Per chart patient was last known well at 11am, at which point she had aphasia for 30 mins, at which point she returned to baseline. She had no other focal neurologic deficits at the time, or any abnormal movements. Came to ED as code stroke, NIHSS on arrival 0, MRS 5.          Review of Systems:     CONSTITUTIONAL: No fevers or chills  EYES/ENT: No visual changes  NECK: No pain or stiffness  NEUROLOGICAL: +As stated in HPI above  All other review of systems is negative unless indicated above.    Allergies:  No Known Allergies      PMHx:   No pertinent past medical history      PFHx:     PSuHx:       Medications:  MEDICATIONS  (STANDING):  ciprofloxacin     Tablet 500 milliGRAM(s) Oral Once    MEDICATIONS  (PRN):      Vitals:  T(C): 36.5 (09-13-23 @ 12:20), Max: 36.5 (09-13-23 @ 12:20)  HR: 69 (09-13-23 @ 12:20) (69 - 69)  BP: 121/77 (09-13-23 @ 12:20) (121/77 - 121/77)  RR: 20 (09-13-23 @ 12:20) (20 - 20)  SpO2: 96% (09-13-23 @ 12:20) (96% - 96%)    Physical Examination:     Constitutional: thin-appearing female in no acute distress  Cardiovascular: no swelling, warm-to-touch    Neurological Examination:    - Mental Status: Eyes open, attends to examiner; oriented to person, age, year, situation  but not month, day or location; speech is fluent with intact naming, intact repetition, and follows 1-step and 3-step mid-line crossing commands; good overall fund of knowledge (aware of current events, and vocabulary appropriate for level of education); immediate recall is 3/3 words and delayed recall is 1/3 words at 5 minutes; able to spell WORLD backwards and recite the days of the week in reverse.    - Cranial Nerves:  II: Visual fields are full to confrontation; pupils are equal, round, and reactive to light   III, IV, VI: Extraocular movements are intact without nystagmus  V: Facial sensation is intact in the V1-V3 distribution bilaterally  VII: Face is symmetric with normal eye closure and smile  VIII: Hearing is intact to conversation  IX, X: Uvula is midline and soft palate rises symmetrically  XI: Shoulder shrug intact  XII: Tongue protrudes in the midline    - Motor/Strength Testing:  - No drifts x 4 extremities.                                   Right           Left  Deltoid                     5                 5  Biceps                      5                 5  Triceps                     5                 5  Wrist Ext (radial)       5                 5  Hand                  5                 5    Hip Flex                   4                  4  Knee Ext	      5                  5  Dorsiflex                  5                  5  Plantarflex               5                  5    - There is no pronator drift.   - decreased muscle bulk and normal tone throughout.    - Reflexes:   Bicep (C5/C6):                  R 2+ --- L 2+   Brachioradialis (C5/C6) :   R 2+ --- L 2+   Patella (L3/L4) :                 R 2+ --- L 2+   Ankle (S1) :                       R 2+ --- L 2+     - Plant responses neutral.    - Sensory: Intact throughout to light touch x 4 extremities.  - Coordination: Finger-nose-finger intact without ataxia or dysmetria.    - Gait: did not assess.           Labs:                        10.7   8.14  )-----------( 219      ( 13 Sep 2023 12:32 )             33.3     09-13    134<L>  |  100  |  28<H>  ----------------------------<  116<H>  4.3   |  21<L>  |  0.85    Ca    9.2      13 Sep 2023 12:32    TPro  7.4  /  Alb  3.6  /  TBili  0.2  /  DBili  x   /  AST  27  /  ALT  12  /  AlkPhos  67  09-13    CAPILLARY BLOOD GLUCOSE  114 (13 Sep 2023 12:38)      POCT Blood Glucose.: 114 mg/dL (13 Sep 2023 12:30)    LIVER FUNCTIONS - ( 13 Sep 2023 12:32 )  Alb: 3.6 g/dL / Pro: 7.4 g/dL / ALK PHOS: 67 U/L / ALT: 12 U/L / AST: 27 U/L / GGT: x             PT/INR - ( 13 Sep 2023 12:32 )   PT: 10.8 sec;   INR: 0.98 ratio         PTT - ( 13 Sep 2023 12:32 )  PTT:28.2 sec                  Radiology:    < from: CT Brain Stroke Protocol (09.13.23 @ 12:50) >      IMPRESSION: Age-appropriate involutional and ischemic gliotic changes. No   change since 6/9/2023. Normal CTA of the head and neck. No large vessel   occlusion. Left-sided permanent pacemaker.    < end of copied text >         Neurology - Consult Note - 09-13-23  -  Gissel Anglin MD  PGY-3 Neurology  -    Name: JUSTINE OROURKE; 89y (04-Apr-1929)    Reason for Admission:     Chief Complaint:  aphasia  HPI: 89y F w/ pmh of hypertension, dementia, hypercholesterolemia, pacemaker implantation presenting from residential facility w/ aphasia. Per chart patient was last known well at 11am, at which point she had aphasia for 30 mins, at which point she returned to baseline. She had no other focal neurologic deficits at the time, or any abnormal movements. Came to ED as code stroke, NIHSS on arrival 0, MRS 5.          Review of Systems:     CONSTITUTIONAL: No fevers or chills  EYES/ENT: No visual changes  NECK: No pain or stiffness  NEUROLOGICAL: +As stated in HPI above  All other review of systems is negative unless indicated above.    Allergies:  No Known Allergies      PMHx:   No pertinent past medical history      PFHx: Non-contributory    PSuHx: No reports of current tobacco, alcohol, or illicit drug use      Medications:  MEDICATIONS  (STANDING):  ciprofloxacin     Tablet 500 milliGRAM(s) Oral Once    MEDICATIONS  (PRN):      Vitals:  T(C): 36.5 (09-13-23 @ 12:20), Max: 36.5 (09-13-23 @ 12:20)  HR: 69 (09-13-23 @ 12:20) (69 - 69)  BP: 121/77 (09-13-23 @ 12:20) (121/77 - 121/77)  RR: 20 (09-13-23 @ 12:20) (20 - 20)  SpO2: 96% (09-13-23 @ 12:20) (96% - 96%)    Physical Examination:     Constitutional: thin-appearing female in no acute distress  Cardiovascular: no swelling, warm-to-touch, peripheral pulses palpable  Eyes: Fundoscopy not well visualized    Neurological Examination:    - Mental Status: Eyes open, attends to examiner; oriented to person, age, year, situation  but not month, day or location; speech is fluent with intact naming, intact repetition, and follows 1-step and 3-step mid-line crossing commands; good overall fund of knowledge (aware of current events, and vocabulary appropriate for level of education); recent and remote memory with immediate recall is 3/3 words and delayed recall is 1/3 words at 5 minutes; attention/concentration intact as able to spell WORLD backwards and recite the days of the week in reverse.    - Cranial Nerves:  II: Visual fields are full to confrontation; pupils are equal, round, and reactive to light   III, IV, VI: Extraocular movements are intact without nystagmus  V: Facial sensation is intact in the V1-V3 distribution bilaterally  VII: Face is symmetric with normal eye closure and smile  VIII: Hearing is intact to conversation  IX, X: Uvula is midline and soft palate rises symmetrically  XI: Shoulder shrug intact  XII: Tongue protrudes in the midline    - Motor/Strength Testing:  - No drifts x 4 extremities.                                   Right           Left  Deltoid                     5                 5  Biceps                      5                 5  Triceps                     5                 5  Wrist Ext (radial)       5                 5  Hand                  5                 5    Hip Flex                   4                  4  Knee Ext	      5                  5  Dorsiflex                  5                  5  Plantarflex               5                  5    - There is no pronator drift.   - decreased muscle bulk and normal tone throughout.    - Reflexes:   Bicep (C5/C6):                  R 2+ --- L 2+   Brachioradialis (C5/C6) :   R 2+ --- L 2+   Patella (L3/L4) :                 R 2+ --- L 2+   Ankle (S1) :                       R 2+ --- L 2+     - Plant responses neutral.    - Sensory: Intact throughout to light touch x 4 extremities.  - Coordination: Finger-nose-finger intact without ataxia or dysmetria.    - Gait and station: did not assess due to fall risk/safety concerns          Labs:                        10.7   8.14  )-----------( 219      ( 13 Sep 2023 12:32 )             33.3     09-13    134<L>  |  100  |  28<H>  ----------------------------<  116<H>  4.3   |  21<L>  |  0.85    Ca    9.2      13 Sep 2023 12:32    TPro  7.4  /  Alb  3.6  /  TBili  0.2  /  DBili  x   /  AST  27  /  ALT  12  /  AlkPhos  67  09-13    CAPILLARY BLOOD GLUCOSE  114 (13 Sep 2023 12:38)      POCT Blood Glucose.: 114 mg/dL (13 Sep 2023 12:30)    LIVER FUNCTIONS - ( 13 Sep 2023 12:32 )  Alb: 3.6 g/dL / Pro: 7.4 g/dL / ALK PHOS: 67 U/L / ALT: 12 U/L / AST: 27 U/L / GGT: x             PT/INR - ( 13 Sep 2023 12:32 )   PT: 10.8 sec;   INR: 0.98 ratio         PTT - ( 13 Sep 2023 12:32 )  PTT:28.2 sec                  Radiology:    < from: CT Brain Stroke Protocol (09.13.23 @ 12:50) >      IMPRESSION: Age-appropriate involutional and ischemic gliotic changes. No   change since 6/9/2023. Normal CTA of the head and neck. No large vessel   occlusion. Left-sided permanent pacemaker.    < end of copied text >

## 2023-09-13 NOTE — ED PROVIDER NOTE - CONVERSATION DETAILS
Spoke to patient's healthcare proxy lizeth Brumfield phone #[phone].  Patient does not have any advance directives, but her niece is currently is considering completing a MOLST to sign DNR/DNI however at this time patient is a full code and niece cannot come in because she was diagnosed with COVID today.  What matters most discussed with niece who states that she and patient would like to be discharged home after labs and UA

## 2023-09-13 NOTE — ED PROVIDER NOTE - NSFOLLOWUPINSTRUCTIONS_ED_ALL_ED_FT
Urinary Tract Infection    A urinary tract infection (UTI) is an infection of any part of the urinary tract, which includes the kidneys, ureters, bladder, and urethra. Risk factors include ignoring your need to urinate, wiping back to front if female, being an uncircumcised male, and having diabetes or a weak immune system. Symptoms include frequent urination, pain or burning with urination, foul smelling urine, cloudy urine, pain in the lower abdomen, blood in the urine, and fever. If you were prescribed an antibiotic medicine, take it as told by your health care provider. Do not stop taking the antibiotic even if you start to feel better.    Take ciprofloxacin 5 cc (500 mg) twice a day for 7 days.    SEEK IMMEDIATE MEDICAL CARE IF YOU HAVE ANY OF THE FOLLOWING SYMPTOMS: severe back or abdominal pain, fever, inability to keep fluids or medicine down, dizziness/lightheadedness, or a change in mental status.

## 2023-09-13 NOTE — ED PROVIDER NOTE - ATTENDING CONTRIBUTION TO CARE
Dr. Corbett: I have personally performed a face to face bedside history and physical examination of this patient. I have discussed the history, examination, review of systems, assessment and plan of management with the fellow. I have reviewed the electronic medical record and amended it to reflect my history, review of systems, physical exam, assessment and plan.    Dr. Corbett: 89-year-old female history of hypertension, hypothyroidism, breast cancer status postmastectomy in the past, dementia, pacemaker, not on blood thinners, here from [facility] as a code stroke.  Patient brought in by EMS, last known well 30 minutes ago, developed brief episode of aphasia but now back to baseline.  No fevers, chills, chest pain, shortness of breath, nausea, vomiting, abdominal pain, dysuria.     On exam patient is very well-appearing, A&O x3, regular rate and rhythm, lungs clear to auscultation bilaterally, rectal temp 99, abdomen soft nontender nondistended.     Code stroke called upon arrival, seen by neurology at bedside, no tenecteplase or thrombectomy recommended as symptoms resolved.  As per neurology patient does not need inpatient work-up and can follow-up as outpatient.    Spoke to patient's healthcare proxy lizeth Potts Octaviano phone #[phone].  Patient does not have any advance directives, her niece is currently is considering completing a MOLST to sign DNR/DNI however at this time patient is a full code and niece cannot come in because she was diagnosed with COVID today.  What matters most discussed with niece who states that she and patient would like to be discharged home after labs and UA    Plan to f/u labs and UA and as per patient and family's wishes will plan to DC pt home.

## 2023-09-13 NOTE — ED PROVIDER NOTE - OBJECTIVE STATEMENT
89-year-old female history of hypertension, high cholesterol, Dementia, presenting for episode of aphasia.  Per facility states at approximately 11 AM experienced new mild mental status, aphasia for 30 minutes.  After expect baseline.  No focal deficits noted at that time, no weakness, patient is responsive, with no current complaints, requesting to go home at this time.

## 2023-09-13 NOTE — ED ADULT TRIAGE NOTE - CHIEF COMPLAINT QUOTE
more lethargic since this morning and had a period of aphasia that lasted for 30 mins at approx 1130    evaled by Dr Burns at charge at 1223. code stroke called at 1224

## 2023-09-13 NOTE — ED PROVIDER NOTE - PROGRESS NOTE DETAILS
Derek Fernandes MD:  Discussed with lizeth, patient's healthcare proxy, okay with DC to facility if medically cleared Derek Fernandes MD:  UTI positive, will DC with followup.

## 2023-09-13 NOTE — CONSULT NOTE PEDS - ATTENDING COMMENTS
HPI as per resident note, personally verified by me. Patient with transient episode of aphasia and confusion without abnormal movements or focal neurologic deficits. Now improved and back to baseline. Found to have UTI and COVID-19 infections.    Neurologic exam as per resident note with additions as below:  AAO x2.25 (2023 only for time), speech fluent  CN's II-XII intact  Strength BUE's 4+/5, BLE's 4/5 and symmetric  Sens intact all  FtN intact b/l  Neutral b/l plantar response    A/P:  Encephalopathy  Aphasia  HTN  UTI  COVID-19 infection  Hyponatremia (Na dec 134)    - Etiology for speech disturbance and mental status change likely toxic/metabolic process in setting of acute infections. No abnormal movements so seizure is less likely. No focal neurologic deficits and head imaging unrevealing for acute intracranial event so also cerebrovascular event less likely  - No additional neurologic testing at this time, will observe for improvement  - Continue to treat above medical problems, as you are doing  - Will continue to follow patient with you

## 2023-09-13 NOTE — ED PROVIDER NOTE - CLINICAL SUMMARY MEDICAL DECISION MAKING FREE TEXT BOX
89 Y F presenting with episode of aphasia, priamry concern rule out inarct, metabolic cause of AMS. common BW, CT, CTA head. common bw. dispo pending results.

## 2023-09-17 NOTE — ED POST DISCHARGE NOTE - RESULT SUMMARY
Prelim ucx >100k enterococcus faecalis. Pt dc'd on cipro. Will f/u sensitivities. Final ucx >10-49K enterococcus faecalis and >100k Pseudomonas aeruginosa. Pt dc'd on cipro. Will f/u sensitivities.

## 2023-10-11 NOTE — ED ADULT NURSE NOTE - OBJECTIVE STATEMENT
Detail Level: Detailed
Quality 110: Preventive Care And Screening: Influenza Immunization: Influenza Immunization not Administered for Documented Reasons.
Additional Notes: Patient declines flu vaccine
Quality 130: Documentation Of Current Medications In The Medical Record: Current Medications Documented
89 year old female presents to ED via EMS from [facility] complaining of aphasia. Per nursing home staff patient had 30 minute episode of AMS and inability to speak, was following commands but not responding to questions. Code stroke called upon EMS arrival. Taken directly to CT scan from EMS bay. Upon assessment patient A&O x4, strong x4 extremities, and without complaints. NIHSS 0. Bed locked and lowered. Comfort and safety measures maintained.

## 2023-10-20 NOTE — ED PROVIDER NOTE - CONVERSATION DETAILS
As per prior ED Provider note:  patient's healthcare proxy nimyron Brumfield phone #[phone].  Patient does not have any advance directives, but her niece is currently is considering completing a MOLST to sign DNR/DNI however at this time patient is a full code and niece cannot come in because she was diagnosed with COVID today.  What matters most discussed with niece who states that she and patient would like to be discharged home after labs and UA

## 2023-10-20 NOTE — ED PROVIDER NOTE - ATTENDING CONTRIBUTION TO CARE
Dr. Corbett: I have personally performed a face to face bedside history and physical examination of this patient. I have discussed the history, examination, review of systems, assessment and plan of management with the resident. I have reviewed the electronic medical record and amended it to reflect my history, review of systems, physical exam, assessment and plan.    see mdm

## 2023-10-20 NOTE — ED ADULT NURSE NOTE - OBJECTIVE STATEMENT
1329 pt 89yf alert x 2 bib ems/trip, per [facility] altered mental status dec in urine output, pt has extensive pmhx able to verbalize concerns, vss pending eval

## 2023-10-20 NOTE — ED PROVIDER NOTE - CLINICAL SUMMARY MEDICAL DECISION MAKING FREE TEXT BOX
Dr. Corbett: 89-year-old female history of hypertension, high cholesterol, dementia, A&O x2 at baseline, seen in the ED last month for similar episode and found to have a UTI and treated as an outpatient, brought in by EMS from [facility] for confusion since yesterday.  As per EMS patient has had decreased urine output.  Patient currently has no complaints and is calm and cooperative.    Gen: No acute distress  HEENT: Mucous membranes minimally dry, pink conjunctivae, EOMI  CV: RRR, no clubbing/cyanosis/edema  Resp: CTAB  GI: Abdomen soft, NT, ND. Normal BS. No rebound, no guarding  : No CVAT  Neuro: A&O x 2, moving all 4 extremities  MSK: No spine or joint tenderness to palpation  Skin: No rashes    Patient is well-appearing and appears baseline compared to my exam of her 1 month ago.  Will provide IV hydration, rule out UTI.  Patient is afebrile here with a normal white count.  Mild hyperkalemia noted.  Dehydration on labs noted.  Will give IV fluids and Lokelma and reassess.  Message left for healthcare proxy.  We will plan to treat as outpatient for UTI as per pt and HCP's wishes during last visit.  As per previous urine culture urine was pansensitive.

## 2023-10-20 NOTE — ED PROVIDER NOTE - CARE PLAN
Principal Discharge DX:	Dehydration  Secondary Diagnosis:	Hyperkalemia   1 Principal Discharge DX:	Dehydration  Secondary Diagnosis:	Hyperkalemia  Secondary Diagnosis:	COVID-19

## 2023-10-20 NOTE — ED ADULT NURSE NOTE - NSFALLASSESSNEED_ED_ALL_ED
no PAST MEDICAL HISTORY:  GERD (gastroesophageal reflux disease)     Ovarian cyst left ovary     PAST MEDICAL HISTORY:  COVID-19 Oct 2020    GERD (gastroesophageal reflux disease)     Ovarian cyst left ovary

## 2023-10-20 NOTE — ED ADULT TRIAGE NOTE - ADDITIONAL SAFETY/BANDS...
Diagnosis: abdominal pain, nausea  Referral Source: PCP or OB/GYN  Primary Care Provider: Mercedes Loyd  Has Diagnosis Been Addressed by PCP: Yes  Has patient been seen by a GI provider in the past 3 years? No   Recent related Imaging performed with results: Yes - Imaging: CT Abdomen/Pelvis, Date: 1/14/21, Results: normal  Recent Lab values related to diagnosis: Yes - Lab: Amylase, CBC, CMP, Date: 12/7/20, Results: normal  Medications currently taking regarding diagnosis: omeprazole  Medications previously tried regarding diagnosis: antacids  Have related medications been adjusted? Yes  Last EGD: N/A  Last Colonoscopy: N/A  Reviewed [facility] and printed related EGD, Colonoscopy, pathology reports, imaging, labs, etc.: NA  Reviewed Care Everywhere: Yes There is relevant information to diagnosis? No  Reviewed Media in Chart Review: Yes There is relevant information to diagnosis? No  If diagnosis has not been addressed by PCP, send referral to Nurse Practitioner to triage.  If diagnosis has been addressed , schedule with appropriate provider.    Additional Safety/Bands:

## 2023-10-20 NOTE — ED PROVIDER NOTE - NSFOLLOWUPINSTRUCTIONS_ED_ALL_ED_FT
You were found to have COVID. Remain hydrated. Follow up with your primary doctor within the next 3-4 days.

## 2023-10-20 NOTE — ED ADULT NURSE NOTE - NSFALLHARMRISKINTERV_ED_ALL_ED

## 2023-10-20 NOTE — ED PROVIDER NOTE - OBJECTIVE STATEMENT
89-year-old female history of hypertension, high cholesterol, Dementia, presenting for AMS from [facility]. unable to obtain history. A&Ox3 but confused. denying any active complaints. on arrival normotensive HR 57 afebrile 100%.

## 2023-10-20 NOTE — ED PROVIDER NOTE - PATIENT PORTAL LINK FT
You can access the FollowMyHealth Patient Portal offered by [facility] by registering at the following website: [email or web address]. By joining [facility]’s FollowMyHealth portal, you will also be able to view your health information using other applications (apps) compatible with our system.

## 2023-10-20 NOTE — ED PROVIDER NOTE - PROGRESS NOTE DETAILS
sg spoke with proxy jayshree, who said she was called by atrimary jane who said her diaper was not wet this morining and she was more confused. patient + covid. pending UA. hemodynamically stable. will talk to atrimary jane regarding transfer back

## 2023-10-20 NOTE — ED ADULT NURSE NOTE - NSICDXPASTMEDICALHX_GEN_ALL_CORE_FT
PAST MEDICAL HISTORY:  Dementia     H/O thyroid disease     Hard of hearing     Hypertension     Mild cognitive impairment

## 2023-10-20 NOTE — ED PROVIDER NOTE - PHYSICAL EXAMINATION
GENERAL: cachectic  HEENT: normal conjunctiva, oral mucosa moist, uvula midline, no tonsilar exudates, neck supple, no JVD  CARDIAC: regular rate and rhythm, normal S1S2, no appreciable murmurs, 2+ pulses in UE/LE b/l  PULM: limited exam, diminished breath sounds, crackles b/l  GI: abdomen nondistended, soft, nontender, no guarding, rebound tenderness  : no CVA tenderness b/l, no suprapubic tenderness  NEURO: no focal motor or sensory deficits,   MSK: no peripheral edema, no calf tenderness b/l  SKIN: well-perfused, extremities warm, no visible rashes  PSYCH: appropriate mood and affect

## 2024-01-01 ENCOUNTER — INPATIENT (INPATIENT)
Facility: HOSPITAL | Age: 89
LOS: 3 days | DRG: 91 | End: 2024-03-12
Attending: FAMILY MEDICINE | Admitting: FAMILY MEDICINE
Payer: MEDICARE

## 2024-01-01 ENCOUNTER — INPATIENT (INPATIENT)
Facility: HOSPITAL | Age: 89
LOS: 4 days | Discharge: HOME | DRG: 193 | End: 2024-03-02
Attending: INTERNAL MEDICINE | Admitting: INTERNAL MEDICINE
Payer: MEDICARE

## 2024-01-01 ENCOUNTER — EMERGENCY (EMERGENCY)
Facility: HOSPITAL | Age: 89
LOS: 1 days | Discharge: HOME | End: 2024-01-01
Attending: EMERGENCY MEDICINE
Payer: MEDICARE

## 2024-01-01 VITALS
HEART RATE: 61 BPM | OXYGEN SATURATION: 94 % | WEIGHT: 89.95 LBS | SYSTOLIC BLOOD PRESSURE: 119 MMHG | HEIGHT: 58 IN | RESPIRATION RATE: 17 BRPM | DIASTOLIC BLOOD PRESSURE: 62 MMHG

## 2024-01-01 VITALS
OXYGEN SATURATION: 95 % | HEART RATE: 83 BPM | SYSTOLIC BLOOD PRESSURE: 121 MMHG | RESPIRATION RATE: 18 BRPM | DIASTOLIC BLOOD PRESSURE: 70 MMHG

## 2024-01-01 VITALS
HEART RATE: 103 BPM | RESPIRATION RATE: 20 BRPM | OXYGEN SATURATION: 95 % | DIASTOLIC BLOOD PRESSURE: 70 MMHG | TEMPERATURE: 97 F | SYSTOLIC BLOOD PRESSURE: 128 MMHG

## 2024-01-01 VITALS
TEMPERATURE: 98 F | HEIGHT: 58 IN | WEIGHT: 89.95 LBS | DIASTOLIC BLOOD PRESSURE: 77 MMHG | RESPIRATION RATE: 18 BRPM | SYSTOLIC BLOOD PRESSURE: 113 MMHG | OXYGEN SATURATION: 96 % | HEART RATE: 88 BPM

## 2024-01-01 VITALS
RESPIRATION RATE: 20 BRPM | HEART RATE: 73 BPM | WEIGHT: 89.95 LBS | DIASTOLIC BLOOD PRESSURE: 70 MMHG | HEIGHT: 58 IN | OXYGEN SATURATION: 98 % | SYSTOLIC BLOOD PRESSURE: 113 MMHG | TEMPERATURE: 99 F

## 2024-01-01 VITALS
HEART RATE: 75 BPM | OXYGEN SATURATION: 95 % | TEMPERATURE: 98 F | DIASTOLIC BLOOD PRESSURE: 70 MMHG | RESPIRATION RATE: 16 BRPM | SYSTOLIC BLOOD PRESSURE: 131 MMHG

## 2024-01-01 DIAGNOSIS — J10.1 INFLUENZA DUE TO OTHER IDENTIFIED INFLUENZA VIRUS WITH OTHER RESPIRATORY MANIFESTATIONS: ICD-10-CM

## 2024-01-01 DIAGNOSIS — R41.82 ALTERED MENTAL STATUS, UNSPECIFIED: ICD-10-CM

## 2024-01-01 LAB
ACANTHOCYTES BLD QL SMEAR: SLIGHT — SIGNIFICANT CHANGE UP
ALBUMIN SERPL ELPH-MCNC: 3.3 G/DL — SIGNIFICANT CHANGE UP (ref 3.3–5)
ALBUMIN SERPL ELPH-MCNC: 3.3 G/DL — SIGNIFICANT CHANGE UP (ref 3.3–5)
ALBUMIN SERPL ELPH-MCNC: 3.6 G/DL — SIGNIFICANT CHANGE UP (ref 3.3–5)
ALP SERPL-CCNC: 61 U/L — SIGNIFICANT CHANGE UP (ref 40–120)
ALP SERPL-CCNC: 70 U/L — SIGNIFICANT CHANGE UP (ref 40–120)
ALP SERPL-CCNC: 80 U/L — SIGNIFICANT CHANGE UP (ref 40–120)
ALT FLD-CCNC: 10 U/L — SIGNIFICANT CHANGE UP (ref 10–45)
ALT FLD-CCNC: 14 U/L — SIGNIFICANT CHANGE UP (ref 10–45)
ALT FLD-CCNC: 20 U/L — SIGNIFICANT CHANGE UP (ref 10–45)
ANION GAP SERPL CALC-SCNC: 11 MMOL/L — SIGNIFICANT CHANGE UP (ref 5–17)
ANION GAP SERPL CALC-SCNC: 13 MMOL/L — SIGNIFICANT CHANGE UP (ref 5–17)
ANION GAP SERPL CALC-SCNC: 14 MMOL/L — SIGNIFICANT CHANGE UP (ref 5–17)
ANION GAP SERPL CALC-SCNC: 14 MMOL/L — SIGNIFICANT CHANGE UP (ref 5–17)
ANION GAP SERPL CALC-SCNC: 15 MMOL/L — SIGNIFICANT CHANGE UP (ref 5–17)
ANION GAP SERPL CALC-SCNC: 18 MMOL/L — HIGH (ref 5–17)
ANION GAP SERPL CALC-SCNC: 18 MMOL/L — HIGH (ref 5–17)
ANISOCYTOSIS BLD QL: SLIGHT — SIGNIFICANT CHANGE UP
APPEARANCE UR: ABNORMAL
APPEARANCE UR: CLEAR — SIGNIFICANT CHANGE UP
APPEARANCE UR: CLEAR — SIGNIFICANT CHANGE UP
APTT BLD: 34.1 SEC — SIGNIFICANT CHANGE UP (ref 24.5–35.6)
AST SERPL-CCNC: 23 U/L — SIGNIFICANT CHANGE UP (ref 10–40)
AST SERPL-CCNC: 27 U/L — SIGNIFICANT CHANGE UP (ref 10–40)
AST SERPL-CCNC: 38 U/L — SIGNIFICANT CHANGE UP (ref 10–40)
BACTERIA # UR AUTO: NEGATIVE /HPF — SIGNIFICANT CHANGE UP
BASE EXCESS BLDA CALC-SCNC: -7.1 MMOL/L — LOW (ref -2–3)
BASE EXCESS BLDV CALC-SCNC: -1.6 MMOL/L — SIGNIFICANT CHANGE UP (ref -2–3)
BASE EXCESS BLDV CALC-SCNC: 0.7 MMOL/L — SIGNIFICANT CHANGE UP (ref -2–3)
BASOPHILS # BLD AUTO: 0 K/UL — SIGNIFICANT CHANGE UP (ref 0–0.2)
BASOPHILS # BLD AUTO: 0.06 K/UL — SIGNIFICANT CHANGE UP (ref 0–0.2)
BASOPHILS # BLD AUTO: 0.09 K/UL — SIGNIFICANT CHANGE UP (ref 0–0.2)
BASOPHILS NFR BLD AUTO: 0 % — SIGNIFICANT CHANGE UP (ref 0–2)
BASOPHILS NFR BLD AUTO: 0.5 % — SIGNIFICANT CHANGE UP (ref 0–2)
BASOPHILS NFR BLD AUTO: 0.7 % — SIGNIFICANT CHANGE UP (ref 0–2)
BILIRUB SERPL-MCNC: 0.3 MG/DL — SIGNIFICANT CHANGE UP (ref 0.2–1.2)
BILIRUB SERPL-MCNC: 0.3 MG/DL — SIGNIFICANT CHANGE UP (ref 0.2–1.2)
BILIRUB SERPL-MCNC: 0.5 MG/DL — SIGNIFICANT CHANGE UP (ref 0.2–1.2)
BILIRUB UR-MCNC: NEGATIVE — SIGNIFICANT CHANGE UP
BUN SERPL-MCNC: 24 MG/DL — HIGH (ref 7–23)
BUN SERPL-MCNC: 25 MG/DL — HIGH (ref 7–23)
BUN SERPL-MCNC: 27 MG/DL — HIGH (ref 7–23)
BUN SERPL-MCNC: 29 MG/DL — HIGH (ref 7–23)
BUN SERPL-MCNC: 33 MG/DL — HIGH (ref 7–23)
BUN SERPL-MCNC: 36 MG/DL — HIGH (ref 7–23)
BUN SERPL-MCNC: 36 MG/DL — HIGH (ref 7–23)
CA-I SERPL-SCNC: 1.19 MMOL/L — SIGNIFICANT CHANGE UP (ref 1.15–1.33)
CA-I SERPL-SCNC: 1.24 MMOL/L — SIGNIFICANT CHANGE UP (ref 1.15–1.33)
CALCIUM SERPL-MCNC: 8.2 MG/DL — LOW (ref 8.4–10.5)
CALCIUM SERPL-MCNC: 8.6 MG/DL — SIGNIFICANT CHANGE UP (ref 8.4–10.5)
CALCIUM SERPL-MCNC: 8.7 MG/DL — SIGNIFICANT CHANGE UP (ref 8.4–10.5)
CALCIUM SERPL-MCNC: 8.8 MG/DL — SIGNIFICANT CHANGE UP (ref 8.4–10.5)
CALCIUM SERPL-MCNC: 9 MG/DL — SIGNIFICANT CHANGE UP (ref 8.4–10.5)
CALCIUM SERPL-MCNC: 9.1 MG/DL — SIGNIFICANT CHANGE UP (ref 8.4–10.5)
CALCIUM SERPL-MCNC: 9.4 MG/DL — SIGNIFICANT CHANGE UP (ref 8.4–10.5)
CAST: 0 /LPF — SIGNIFICANT CHANGE UP (ref 0–4)
CAST: 15 /LPF — HIGH (ref 0–4)
CAST: 6 /LPF — HIGH (ref 0–4)
CHLORIDE BLDV-SCNC: 106 MMOL/L — SIGNIFICANT CHANGE UP (ref 96–108)
CHLORIDE BLDV-SCNC: 99 MMOL/L — SIGNIFICANT CHANGE UP (ref 96–108)
CHLORIDE SERPL-SCNC: 101 MMOL/L — SIGNIFICANT CHANGE UP (ref 96–108)
CHLORIDE SERPL-SCNC: 105 MMOL/L — SIGNIFICANT CHANGE UP (ref 96–108)
CHLORIDE SERPL-SCNC: 107 MMOL/L — SIGNIFICANT CHANGE UP (ref 96–108)
CHLORIDE SERPL-SCNC: 112 MMOL/L — HIGH (ref 96–108)
CHLORIDE SERPL-SCNC: 115 MMOL/L — HIGH (ref 96–108)
CHLORIDE SERPL-SCNC: 121 MMOL/L — HIGH (ref 96–108)
CHLORIDE SERPL-SCNC: 99 MMOL/L — SIGNIFICANT CHANGE UP (ref 96–108)
CO2 BLDA-SCNC: 18 MMOL/L — LOW (ref 19–24)
CO2 BLDV-SCNC: 24 MMOL/L — SIGNIFICANT CHANGE UP (ref 22–26)
CO2 BLDV-SCNC: 29 MMOL/L — HIGH (ref 22–26)
CO2 SERPL-SCNC: 16 MMOL/L — LOW (ref 22–31)
CO2 SERPL-SCNC: 17 MMOL/L — LOW (ref 22–31)
CO2 SERPL-SCNC: 19 MMOL/L — LOW (ref 22–31)
CO2 SERPL-SCNC: 20 MMOL/L — LOW (ref 22–31)
CO2 SERPL-SCNC: 22 MMOL/L — SIGNIFICANT CHANGE UP (ref 22–31)
CO2 SERPL-SCNC: 22 MMOL/L — SIGNIFICANT CHANGE UP (ref 22–31)
CO2 SERPL-SCNC: 23 MMOL/L — SIGNIFICANT CHANGE UP (ref 22–31)
COARSE GRAN CASTS #/AREA URNS AUTO: PRESENT
COLOR SPEC: SIGNIFICANT CHANGE UP
COLOR SPEC: SIGNIFICANT CHANGE UP
COLOR SPEC: YELLOW — SIGNIFICANT CHANGE UP
COMMENT - URINE: SIGNIFICANT CHANGE UP
CREAT SERPL-MCNC: 0.71 MG/DL — SIGNIFICANT CHANGE UP (ref 0.5–1.3)
CREAT SERPL-MCNC: 0.76 MG/DL — SIGNIFICANT CHANGE UP (ref 0.5–1.3)
CREAT SERPL-MCNC: 0.76 MG/DL — SIGNIFICANT CHANGE UP (ref 0.5–1.3)
CREAT SERPL-MCNC: 0.77 MG/DL — SIGNIFICANT CHANGE UP (ref 0.5–1.3)
CREAT SERPL-MCNC: 0.83 MG/DL — SIGNIFICANT CHANGE UP (ref 0.5–1.3)
CREAT SERPL-MCNC: 0.95 MG/DL — SIGNIFICANT CHANGE UP (ref 0.5–1.3)
CREAT SERPL-MCNC: 0.98 MG/DL — SIGNIFICANT CHANGE UP (ref 0.5–1.3)
CULTURE RESULTS: NO GROWTH — SIGNIFICANT CHANGE UP
DACRYOCYTES BLD QL SMEAR: SLIGHT — SIGNIFICANT CHANGE UP
DIFF PNL FLD: ABNORMAL
DIFF PNL FLD: ABNORMAL
DIFF PNL FLD: NEGATIVE — SIGNIFICANT CHANGE UP
EGFR: 53 ML/MIN/1.73M2 — LOW
EGFR: 56 ML/MIN/1.73M2 — LOW
EGFR: 65 ML/MIN/1.73M2 — SIGNIFICANT CHANGE UP
EGFR: 71 ML/MIN/1.73M2 — SIGNIFICANT CHANGE UP
EGFR: 73 ML/MIN/1.73M2 — SIGNIFICANT CHANGE UP
EGFR: 73 ML/MIN/1.73M2 — SIGNIFICANT CHANGE UP
EGFR: 79 ML/MIN/1.73M2 — SIGNIFICANT CHANGE UP
EOSINOPHIL # BLD AUTO: 0 K/UL — SIGNIFICANT CHANGE UP (ref 0–0.5)
EOSINOPHIL # BLD AUTO: 0.04 K/UL — SIGNIFICANT CHANGE UP (ref 0–0.5)
EOSINOPHIL # BLD AUTO: 0.15 K/UL — SIGNIFICANT CHANGE UP (ref 0–0.5)
EOSINOPHIL NFR BLD AUTO: 0 % — SIGNIFICANT CHANGE UP (ref 0–6)
EOSINOPHIL NFR BLD AUTO: 0.3 % — SIGNIFICANT CHANGE UP (ref 0–6)
EOSINOPHIL NFR BLD AUTO: 1.2 % — SIGNIFICANT CHANGE UP (ref 0–6)
FLUAV AG NPH QL: DETECTED
FLUAV AG NPH QL: DETECTED
FLUAV AG NPH QL: SIGNIFICANT CHANGE UP
FLUBV AG NPH QL: SIGNIFICANT CHANGE UP
GAS PNL BLDA: SIGNIFICANT CHANGE UP
GAS PNL BLDV: 132 MMOL/L — LOW (ref 136–145)
GAS PNL BLDV: 138 MMOL/L — SIGNIFICANT CHANGE UP (ref 136–145)
GAS PNL BLDV: SIGNIFICANT CHANGE UP
GLUCOSE BLDC GLUCOMTR-MCNC: 103 MG/DL — HIGH (ref 70–99)
GLUCOSE BLDC GLUCOMTR-MCNC: 127 MG/DL — HIGH (ref 70–99)
GLUCOSE BLDC GLUCOMTR-MCNC: 128 MG/DL — HIGH (ref 70–99)
GLUCOSE BLDC GLUCOMTR-MCNC: 129 MG/DL — HIGH (ref 70–99)
GLUCOSE BLDC GLUCOMTR-MCNC: 132 MG/DL — HIGH (ref 70–99)
GLUCOSE BLDC GLUCOMTR-MCNC: 134 MG/DL — HIGH (ref 70–99)
GLUCOSE BLDC GLUCOMTR-MCNC: 146 MG/DL — HIGH (ref 70–99)
GLUCOSE BLDC GLUCOMTR-MCNC: 146 MG/DL — HIGH (ref 70–99)
GLUCOSE BLDC GLUCOMTR-MCNC: 152 MG/DL — HIGH (ref 70–99)
GLUCOSE BLDC GLUCOMTR-MCNC: 159 MG/DL — HIGH (ref 70–99)
GLUCOSE BLDC GLUCOMTR-MCNC: 161 MG/DL — HIGH (ref 70–99)
GLUCOSE BLDC GLUCOMTR-MCNC: 164 MG/DL — HIGH (ref 70–99)
GLUCOSE BLDC GLUCOMTR-MCNC: 202 MG/DL — HIGH (ref 70–99)
GLUCOSE BLDC GLUCOMTR-MCNC: 90 MG/DL — SIGNIFICANT CHANGE UP (ref 70–99)
GLUCOSE BLDV-MCNC: 91 MG/DL — SIGNIFICANT CHANGE UP (ref 70–99)
GLUCOSE BLDV-MCNC: 98 MG/DL — SIGNIFICANT CHANGE UP (ref 70–99)
GLUCOSE SERPL-MCNC: 103 MG/DL — HIGH (ref 70–99)
GLUCOSE SERPL-MCNC: 133 MG/DL — HIGH (ref 70–99)
GLUCOSE SERPL-MCNC: 146 MG/DL — HIGH (ref 70–99)
GLUCOSE SERPL-MCNC: 161 MG/DL — HIGH (ref 70–99)
GLUCOSE SERPL-MCNC: 72 MG/DL — SIGNIFICANT CHANGE UP (ref 70–99)
GLUCOSE SERPL-MCNC: 89 MG/DL — SIGNIFICANT CHANGE UP (ref 70–99)
GLUCOSE SERPL-MCNC: 92 MG/DL — SIGNIFICANT CHANGE UP (ref 70–99)
GLUCOSE UR QL: NEGATIVE MG/DL — SIGNIFICANT CHANGE UP
HCO3 BLDA-SCNC: 17 MMOL/L — LOW (ref 21–28)
HCO3 BLDV-SCNC: 23 MMOL/L — SIGNIFICANT CHANGE UP (ref 22–29)
HCO3 BLDV-SCNC: 27 MMOL/L — SIGNIFICANT CHANGE UP (ref 22–29)
HCT VFR BLD CALC: 31.2 % — LOW (ref 34.5–45)
HCT VFR BLD CALC: 32.3 % — LOW (ref 34.5–45)
HCT VFR BLD CALC: 32.5 % — LOW (ref 34.5–45)
HCT VFR BLD CALC: 32.7 % — LOW (ref 34.5–45)
HCT VFR BLD CALC: 33.6 % — LOW (ref 34.5–45)
HCT VFR BLD CALC: 33.8 % — LOW (ref 34.5–45)
HCT VFR BLD CALC: 34.1 % — LOW (ref 34.5–45)
HCT VFR BLDA CALC: 33 % — LOW (ref 34.5–46.5)
HCT VFR BLDA CALC: 36 % — SIGNIFICANT CHANGE UP (ref 34.5–46.5)
HGB BLD CALC-MCNC: 11.1 G/DL — LOW (ref 11.7–16.1)
HGB BLD CALC-MCNC: 12 G/DL — SIGNIFICANT CHANGE UP (ref 11.7–16.1)
HGB BLD-MCNC: 10.1 G/DL — LOW (ref 11.5–15.5)
HGB BLD-MCNC: 10.2 G/DL — LOW (ref 11.5–15.5)
HGB BLD-MCNC: 10.7 G/DL — LOW (ref 11.5–15.5)
HGB BLD-MCNC: 11.1 G/DL — LOW (ref 11.5–15.5)
HGB BLD-MCNC: 11.1 G/DL — LOW (ref 11.5–15.5)
HGB BLD-MCNC: 11.3 G/DL — LOW (ref 11.5–15.5)
HGB BLD-MCNC: 9.9 G/DL — LOW (ref 11.5–15.5)
HOROWITZ INDEX BLDA+IHG-RTO: 21 — SIGNIFICANT CHANGE UP
HYALINE CASTS # UR AUTO: PRESENT
IMM GRANULOCYTES NFR BLD AUTO: 0.4 % — SIGNIFICANT CHANGE UP (ref 0–0.9)
IMM GRANULOCYTES NFR BLD AUTO: 0.5 % — SIGNIFICANT CHANGE UP (ref 0–0.9)
INR BLD: 1.3 RATIO — HIGH (ref 0.85–1.18)
KETONES UR-MCNC: 40 MG/DL
KETONES UR-MCNC: ABNORMAL MG/DL
KETONES UR-MCNC: NEGATIVE MG/DL — SIGNIFICANT CHANGE UP
LACTATE BLDV-MCNC: 1.2 MMOL/L — SIGNIFICANT CHANGE UP (ref 0.5–2)
LACTATE BLDV-MCNC: 1.3 MMOL/L — SIGNIFICANT CHANGE UP (ref 0.5–2)
LEUKOCYTE ESTERASE UR-ACNC: ABNORMAL
LEUKOCYTE ESTERASE UR-ACNC: ABNORMAL
LEUKOCYTE ESTERASE UR-ACNC: NEGATIVE — SIGNIFICANT CHANGE UP
LYMPHOCYTES # BLD AUTO: 1.35 K/UL — SIGNIFICANT CHANGE UP (ref 1–3.3)
LYMPHOCYTES # BLD AUTO: 1.44 K/UL — SIGNIFICANT CHANGE UP (ref 1–3.3)
LYMPHOCYTES # BLD AUTO: 1.86 K/UL — SIGNIFICANT CHANGE UP (ref 1–3.3)
LYMPHOCYTES # BLD AUTO: 11.7 % — LOW (ref 13–44)
LYMPHOCYTES # BLD AUTO: 14.7 % — SIGNIFICANT CHANGE UP (ref 13–44)
LYMPHOCYTES # BLD AUTO: 27.4 % — SIGNIFICANT CHANGE UP (ref 13–44)
MACROCYTES BLD QL: SLIGHT — SIGNIFICANT CHANGE UP
MAGNESIUM SERPL-MCNC: 1.8 MG/DL — SIGNIFICANT CHANGE UP (ref 1.6–2.6)
MANUAL SMEAR VERIFICATION: SIGNIFICANT CHANGE UP
MCHC RBC-ENTMCNC: 27.7 PG — SIGNIFICANT CHANGE UP (ref 27–34)
MCHC RBC-ENTMCNC: 28 PG — SIGNIFICANT CHANGE UP (ref 27–34)
MCHC RBC-ENTMCNC: 28 PG — SIGNIFICANT CHANGE UP (ref 27–34)
MCHC RBC-ENTMCNC: 28.2 PG — SIGNIFICANT CHANGE UP (ref 27–34)
MCHC RBC-ENTMCNC: 28.3 PG — SIGNIFICANT CHANGE UP (ref 27–34)
MCHC RBC-ENTMCNC: 28.7 PG — SIGNIFICANT CHANGE UP (ref 27–34)
MCHC RBC-ENTMCNC: 29 PG — SIGNIFICANT CHANGE UP (ref 27–34)
MCHC RBC-ENTMCNC: 30.9 GM/DL — LOW (ref 32–36)
MCHC RBC-ENTMCNC: 31.4 GM/DL — LOW (ref 32–36)
MCHC RBC-ENTMCNC: 31.7 GM/DL — LOW (ref 32–36)
MCHC RBC-ENTMCNC: 32.6 GM/DL — SIGNIFICANT CHANGE UP (ref 32–36)
MCHC RBC-ENTMCNC: 33 GM/DL — SIGNIFICANT CHANGE UP (ref 32–36)
MCHC RBC-ENTMCNC: 33.1 GM/DL — SIGNIFICANT CHANGE UP (ref 32–36)
MCHC RBC-ENTMCNC: 33.4 GM/DL — SIGNIFICANT CHANGE UP (ref 32–36)
MCV RBC AUTO: 85.7 FL — SIGNIFICANT CHANGE UP (ref 80–100)
MCV RBC AUTO: 86.5 FL — SIGNIFICANT CHANGE UP (ref 80–100)
MCV RBC AUTO: 86.6 FL — SIGNIFICANT CHANGE UP (ref 80–100)
MCV RBC AUTO: 86.7 FL — SIGNIFICANT CHANGE UP (ref 80–100)
MCV RBC AUTO: 88.3 FL — SIGNIFICANT CHANGE UP (ref 80–100)
MCV RBC AUTO: 88.4 FL — SIGNIFICANT CHANGE UP (ref 80–100)
MCV RBC AUTO: 90.6 FL — SIGNIFICANT CHANGE UP (ref 80–100)
MONOCYTES # BLD AUTO: 0.22 K/UL — SIGNIFICANT CHANGE UP (ref 0–0.9)
MONOCYTES # BLD AUTO: 0.52 K/UL — SIGNIFICANT CHANGE UP (ref 0–0.9)
MONOCYTES # BLD AUTO: 1.03 K/UL — HIGH (ref 0–0.9)
MONOCYTES NFR BLD AUTO: 4.2 % — SIGNIFICANT CHANGE UP (ref 2–14)
MONOCYTES NFR BLD AUTO: 4.4 % — SIGNIFICANT CHANGE UP (ref 2–14)
MONOCYTES NFR BLD AUTO: 8.2 % — SIGNIFICANT CHANGE UP (ref 2–14)
MRSA PCR RESULT.: SIGNIFICANT CHANGE UP
NEUTROPHILS # BLD AUTO: 10.13 K/UL — HIGH (ref 1.8–7.4)
NEUTROPHILS # BLD AUTO: 3.32 K/UL — SIGNIFICANT CHANGE UP (ref 1.8–7.4)
NEUTROPHILS # BLD AUTO: 9.54 K/UL — HIGH (ref 1.8–7.4)
NEUTROPHILS NFR BLD AUTO: 67.3 % — SIGNIFICANT CHANGE UP (ref 43–77)
NEUTROPHILS NFR BLD AUTO: 75.6 % — SIGNIFICANT CHANGE UP (ref 43–77)
NEUTROPHILS NFR BLD AUTO: 82 % — HIGH (ref 43–77)
NITRITE UR-MCNC: NEGATIVE — SIGNIFICANT CHANGE UP
NITRITE UR-MCNC: NEGATIVE — SIGNIFICANT CHANGE UP
NITRITE UR-MCNC: POSITIVE
NRBC # BLD: 0 /100 WBCS — SIGNIFICANT CHANGE UP (ref 0–0)
OVALOCYTES BLD QL SMEAR: SLIGHT — SIGNIFICANT CHANGE UP
PCO2 BLDA: 28 MMHG — LOW (ref 32–45)
PCO2 BLDV: 37 MMHG — LOW (ref 39–42)
PCO2 BLDV: 52 MMHG — HIGH (ref 39–42)
PH BLDA: 7.38 — SIGNIFICANT CHANGE UP (ref 7.35–7.45)
PH BLDV: 7.33 — SIGNIFICANT CHANGE UP (ref 7.32–7.43)
PH BLDV: 7.4 — SIGNIFICANT CHANGE UP (ref 7.32–7.43)
PH UR: 5.5 — SIGNIFICANT CHANGE UP (ref 5–8)
PH UR: 5.5 — SIGNIFICANT CHANGE UP (ref 5–8)
PH UR: 6 — SIGNIFICANT CHANGE UP (ref 5–8)
PHOSPHATE SERPL-MCNC: 1.7 MG/DL — LOW (ref 2.5–4.5)
PHOSPHATE SERPL-MCNC: 3.6 MG/DL — SIGNIFICANT CHANGE UP (ref 2.5–4.5)
PLAT MORPH BLD: NORMAL — SIGNIFICANT CHANGE UP
PLATELET # BLD AUTO: 179 K/UL — SIGNIFICANT CHANGE UP (ref 150–400)
PLATELET # BLD AUTO: 205 K/UL — SIGNIFICANT CHANGE UP (ref 150–400)
PLATELET # BLD AUTO: 317 K/UL — SIGNIFICANT CHANGE UP (ref 150–400)
PLATELET # BLD AUTO: 359 K/UL — SIGNIFICANT CHANGE UP (ref 150–400)
PLATELET # BLD AUTO: 369 K/UL — SIGNIFICANT CHANGE UP (ref 150–400)
PLATELET # BLD AUTO: 371 K/UL — SIGNIFICANT CHANGE UP (ref 150–400)
PLATELET # BLD AUTO: 400 K/UL — SIGNIFICANT CHANGE UP (ref 150–400)
PO2 BLDA: 63 MMHG — LOW (ref 83–108)
PO2 BLDV: 21 MMHG — LOW (ref 25–45)
PO2 BLDV: 45 MMHG — SIGNIFICANT CHANGE UP (ref 25–45)
POIKILOCYTOSIS BLD QL AUTO: SLIGHT — SIGNIFICANT CHANGE UP
POTASSIUM BLDV-SCNC: 3.6 MMOL/L — SIGNIFICANT CHANGE UP (ref 3.5–5.1)
POTASSIUM BLDV-SCNC: 4 MMOL/L — SIGNIFICANT CHANGE UP (ref 3.5–5.1)
POTASSIUM SERPL-MCNC: 2.8 MMOL/L — CRITICAL LOW (ref 3.5–5.3)
POTASSIUM SERPL-MCNC: 3.5 MMOL/L — SIGNIFICANT CHANGE UP (ref 3.5–5.3)
POTASSIUM SERPL-MCNC: 3.7 MMOL/L — SIGNIFICANT CHANGE UP (ref 3.5–5.3)
POTASSIUM SERPL-MCNC: 3.8 MMOL/L — SIGNIFICANT CHANGE UP (ref 3.5–5.3)
POTASSIUM SERPL-MCNC: 4.1 MMOL/L — SIGNIFICANT CHANGE UP (ref 3.5–5.3)
POTASSIUM SERPL-MCNC: 4.3 MMOL/L — SIGNIFICANT CHANGE UP (ref 3.5–5.3)
POTASSIUM SERPL-MCNC: 5 MMOL/L — SIGNIFICANT CHANGE UP (ref 3.5–5.3)
POTASSIUM SERPL-SCNC: 2.8 MMOL/L — CRITICAL LOW (ref 3.5–5.3)
POTASSIUM SERPL-SCNC: 3.5 MMOL/L — SIGNIFICANT CHANGE UP (ref 3.5–5.3)
POTASSIUM SERPL-SCNC: 3.7 MMOL/L — SIGNIFICANT CHANGE UP (ref 3.5–5.3)
POTASSIUM SERPL-SCNC: 3.8 MMOL/L — SIGNIFICANT CHANGE UP (ref 3.5–5.3)
POTASSIUM SERPL-SCNC: 4.1 MMOL/L — SIGNIFICANT CHANGE UP (ref 3.5–5.3)
POTASSIUM SERPL-SCNC: 4.3 MMOL/L — SIGNIFICANT CHANGE UP (ref 3.5–5.3)
POTASSIUM SERPL-SCNC: 5 MMOL/L — SIGNIFICANT CHANGE UP (ref 3.5–5.3)
PROT SERPL-MCNC: 6.7 G/DL — SIGNIFICANT CHANGE UP (ref 6–8.3)
PROT SERPL-MCNC: 7.2 G/DL — SIGNIFICANT CHANGE UP (ref 6–8.3)
PROT SERPL-MCNC: 7.7 G/DL — SIGNIFICANT CHANGE UP (ref 6–8.3)
PROT UR-MCNC: 30 MG/DL
PROTHROM AB SERPL-ACNC: 13.5 SEC — HIGH (ref 9.5–13)
RBC # BLD: 3.53 M/UL — LOW (ref 3.8–5.2)
RBC # BLD: 3.61 M/UL — LOW (ref 3.8–5.2)
RBC # BLD: 3.68 M/UL — LOW (ref 3.8–5.2)
RBC # BLD: 3.73 M/UL — LOW (ref 3.8–5.2)
RBC # BLD: 3.9 M/UL — SIGNIFICANT CHANGE UP (ref 3.8–5.2)
RBC # BLD: 3.92 M/UL — SIGNIFICANT CHANGE UP (ref 3.8–5.2)
RBC # BLD: 3.94 M/UL — SIGNIFICANT CHANGE UP (ref 3.8–5.2)
RBC # FLD: 13.2 % — SIGNIFICANT CHANGE UP (ref 10.3–14.5)
RBC # FLD: 13.3 % — SIGNIFICANT CHANGE UP (ref 10.3–14.5)
RBC # FLD: 13.5 % — SIGNIFICANT CHANGE UP (ref 10.3–14.5)
RBC # FLD: 13.6 % — SIGNIFICANT CHANGE UP (ref 10.3–14.5)
RBC # FLD: 14.1 % — SIGNIFICANT CHANGE UP (ref 10.3–14.5)
RBC BLD AUTO: ABNORMAL
RBC CASTS # UR COMP ASSIST: 161 /HPF — HIGH (ref 0–4)
RBC CASTS # UR COMP ASSIST: 2 /HPF — SIGNIFICANT CHANGE UP (ref 0–4)
RBC CASTS # UR COMP ASSIST: 3 /HPF — SIGNIFICANT CHANGE UP (ref 0–4)
REVIEW: SIGNIFICANT CHANGE UP
REVIEW: SIGNIFICANT CHANGE UP
RSV RNA NPH QL NAA+NON-PROBE: SIGNIFICANT CHANGE UP
S AUREUS DNA NOSE QL NAA+PROBE: SIGNIFICANT CHANGE UP
SAO2 % BLDA: 93.1 % — LOW (ref 94–98)
SAO2 % BLDV: 26.1 % — LOW (ref 67–88)
SAO2 % BLDV: 78.7 % — SIGNIFICANT CHANGE UP (ref 67–88)
SARS-COV-2 RNA SPEC QL NAA+PROBE: SIGNIFICANT CHANGE UP
SODIUM SERPL-SCNC: 135 MMOL/L — SIGNIFICANT CHANGE UP (ref 135–145)
SODIUM SERPL-SCNC: 138 MMOL/L — SIGNIFICANT CHANGE UP (ref 135–145)
SODIUM SERPL-SCNC: 141 MMOL/L — SIGNIFICANT CHANGE UP (ref 135–145)
SODIUM SERPL-SCNC: 142 MMOL/L — SIGNIFICANT CHANGE UP (ref 135–145)
SODIUM SERPL-SCNC: 146 MMOL/L — HIGH (ref 135–145)
SODIUM SERPL-SCNC: 148 MMOL/L — HIGH (ref 135–145)
SODIUM SERPL-SCNC: 152 MMOL/L — HIGH (ref 135–145)
SP GR SPEC: 1.02 — SIGNIFICANT CHANGE UP (ref 1–1.03)
SPECIMEN SOURCE: SIGNIFICANT CHANGE UP
SQUAMOUS # UR AUTO: 3 /HPF — SIGNIFICANT CHANGE UP (ref 0–5)
SQUAMOUS # UR AUTO: 3 /HPF — SIGNIFICANT CHANGE UP (ref 0–5)
SQUAMOUS # UR AUTO: 8 /HPF — HIGH (ref 0–5)
TROPONIN T, HIGH SENSITIVITY RESULT: 180 NG/L — HIGH (ref 0–51)
TROPONIN T, HIGH SENSITIVITY RESULT: 206 NG/L — HIGH (ref 0–51)
TSH SERPL-MCNC: 1.62 UIU/ML — SIGNIFICANT CHANGE UP (ref 0.27–4.2)
TSH SERPL-MCNC: 2.48 UIU/ML — SIGNIFICANT CHANGE UP (ref 0.27–4.2)
UROBILINOGEN FLD QL: 0.2 MG/DL — SIGNIFICANT CHANGE UP (ref 0.2–1)
UROBILINOGEN FLD QL: 0.2 MG/DL — SIGNIFICANT CHANGE UP (ref 0.2–1)
UROBILINOGEN FLD QL: 1 MG/DL — SIGNIFICANT CHANGE UP (ref 0.2–1)
VARIANT LYMPHS # BLD: 0.9 % — SIGNIFICANT CHANGE UP (ref 0–6)
WBC # BLD: 10.28 K/UL — SIGNIFICANT CHANGE UP (ref 3.8–10.5)
WBC # BLD: 12.35 K/UL — HIGH (ref 3.8–10.5)
WBC # BLD: 12.62 K/UL — HIGH (ref 3.8–10.5)
WBC # BLD: 15.22 K/UL — HIGH (ref 3.8–10.5)
WBC # BLD: 15.88 K/UL — HIGH (ref 3.8–10.5)
WBC # BLD: 4.94 K/UL — SIGNIFICANT CHANGE UP (ref 3.8–10.5)
WBC # BLD: 6.75 K/UL — SIGNIFICANT CHANGE UP (ref 3.8–10.5)
WBC # FLD AUTO: 10.28 K/UL — SIGNIFICANT CHANGE UP (ref 3.8–10.5)
WBC # FLD AUTO: 12.35 K/UL — HIGH (ref 3.8–10.5)
WBC # FLD AUTO: 12.62 K/UL — HIGH (ref 3.8–10.5)
WBC # FLD AUTO: 15.22 K/UL — HIGH (ref 3.8–10.5)
WBC # FLD AUTO: 15.88 K/UL — HIGH (ref 3.8–10.5)
WBC # FLD AUTO: 4.94 K/UL — SIGNIFICANT CHANGE UP (ref 3.8–10.5)
WBC # FLD AUTO: 6.75 K/UL — SIGNIFICANT CHANGE UP (ref 3.8–10.5)
WBC CLUMPS # UR AUTO: PRESENT
WBC UR QL: 1 /HPF — SIGNIFICANT CHANGE UP (ref 0–5)
WBC UR QL: 104 /HPF — HIGH (ref 0–5)
WBC UR QL: 64 /HPF — HIGH (ref 0–5)

## 2024-01-01 PROCEDURE — 87086 URINE CULTURE/COLONY COUNT: CPT

## 2024-01-01 PROCEDURE — 71045 X-RAY EXAM CHEST 1 VIEW: CPT | Mod: 26

## 2024-01-01 PROCEDURE — 93005 ELECTROCARDIOGRAM TRACING: CPT

## 2024-01-01 PROCEDURE — 99285 EMERGENCY DEPT VISIT HI MDM: CPT

## 2024-01-01 PROCEDURE — 87640 STAPH A DNA AMP PROBE: CPT

## 2024-01-01 PROCEDURE — 85027 COMPLETE CBC AUTOMATED: CPT

## 2024-01-01 PROCEDURE — 84132 ASSAY OF SERUM POTASSIUM: CPT

## 2024-01-01 PROCEDURE — 81001 URINALYSIS AUTO W/SCOPE: CPT

## 2024-01-01 PROCEDURE — 93010 ELECTROCARDIOGRAM REPORT: CPT

## 2024-01-01 PROCEDURE — 82435 ASSAY OF BLOOD CHLORIDE: CPT

## 2024-01-01 PROCEDURE — 70450 CT HEAD/BRAIN W/O DYE: CPT | Mod: 26

## 2024-01-01 PROCEDURE — 84443 ASSAY THYROID STIM HORMONE: CPT

## 2024-01-01 PROCEDURE — 99223 1ST HOSP IP/OBS HIGH 75: CPT

## 2024-01-01 PROCEDURE — 93280 PM DEVICE PROGR EVAL DUAL: CPT | Mod: 26

## 2024-01-01 PROCEDURE — 71045 X-RAY EXAM CHEST 1 VIEW: CPT

## 2024-01-01 PROCEDURE — 85730 THROMBOPLASTIN TIME PARTIAL: CPT

## 2024-01-01 PROCEDURE — 87637 SARSCOV2&INF A&B&RSV AMP PRB: CPT

## 2024-01-01 PROCEDURE — 80053 COMPREHEN METABOLIC PANEL: CPT

## 2024-01-01 PROCEDURE — 85025 COMPLETE CBC W/AUTO DIFF WBC: CPT

## 2024-01-01 PROCEDURE — 84100 ASSAY OF PHOSPHORUS: CPT

## 2024-01-01 PROCEDURE — 99285 EMERGENCY DEPT VISIT HI MDM: CPT | Mod: GC

## 2024-01-01 PROCEDURE — 83605 ASSAY OF LACTIC ACID: CPT

## 2024-01-01 PROCEDURE — 82803 BLOOD GASES ANY COMBINATION: CPT

## 2024-01-01 PROCEDURE — 82947 ASSAY GLUCOSE BLOOD QUANT: CPT

## 2024-01-01 PROCEDURE — 70450 CT HEAD/BRAIN W/O DYE: CPT | Mod: MC

## 2024-01-01 PROCEDURE — 99284 EMERGENCY DEPT VISIT MOD MDM: CPT

## 2024-01-01 PROCEDURE — 84295 ASSAY OF SERUM SODIUM: CPT

## 2024-01-01 PROCEDURE — 82330 ASSAY OF CALCIUM: CPT

## 2024-01-01 PROCEDURE — 87641 MR-STAPH DNA AMP PROBE: CPT

## 2024-01-01 PROCEDURE — 36600 WITHDRAWAL OF ARTERIAL BLOOD: CPT

## 2024-01-01 PROCEDURE — 85014 HEMATOCRIT: CPT

## 2024-01-01 PROCEDURE — 80048 BASIC METABOLIC PNL TOTAL CA: CPT

## 2024-01-01 PROCEDURE — 82962 GLUCOSE BLOOD TEST: CPT

## 2024-01-01 PROCEDURE — 83735 ASSAY OF MAGNESIUM: CPT

## 2024-01-01 PROCEDURE — 87040 BLOOD CULTURE FOR BACTERIA: CPT

## 2024-01-01 PROCEDURE — 85018 HEMOGLOBIN: CPT

## 2024-01-01 PROCEDURE — 36415 COLL VENOUS BLD VENIPUNCTURE: CPT

## 2024-01-01 PROCEDURE — 97161 PT EVAL LOW COMPLEX 20 MIN: CPT

## 2024-01-01 PROCEDURE — 92610 EVALUATE SWALLOWING FUNCTION: CPT

## 2024-01-01 PROCEDURE — 84484 ASSAY OF TROPONIN QUANT: CPT

## 2024-01-01 PROCEDURE — 85610 PROTHROMBIN TIME: CPT

## 2024-01-01 PROCEDURE — 99285 EMERGENCY DEPT VISIT HI MDM: CPT | Mod: 25

## 2024-01-01 PROCEDURE — 94640 AIRWAY INHALATION TREATMENT: CPT

## 2024-01-01 RX ORDER — THIAMINE MONONITRATE (VIT B1) 100 MG
1 TABLET ORAL
Qty: 0 | Refills: 0 | DISCHARGE

## 2024-01-01 RX ORDER — PIPERACILLIN AND TAZOBACTAM 4; .5 G/20ML; G/20ML
3.38 INJECTION, POWDER, LYOPHILIZED, FOR SOLUTION INTRAVENOUS ONCE
Refills: 0 | Status: COMPLETED | OUTPATIENT
Start: 2024-01-01 | End: 2024-01-01

## 2024-01-01 RX ORDER — LOSARTAN POTASSIUM 100 MG/1
1 TABLET, FILM COATED ORAL
Qty: 30 | Refills: 0
Start: 2024-01-01 | End: 2024-03-30

## 2024-01-01 RX ORDER — LANOLIN ALCOHOL/MO/W.PET/CERES
3 CREAM (GRAM) TOPICAL AT BEDTIME
Refills: 0 | Status: DISCONTINUED | OUTPATIENT
Start: 2024-01-01 | End: 2024-01-01

## 2024-01-01 RX ORDER — FOLIC ACID 0.8 MG
1 TABLET ORAL DAILY
Refills: 0 | Status: DISCONTINUED | OUTPATIENT
Start: 2024-01-01 | End: 2024-01-01

## 2024-01-01 RX ORDER — ACETAMINOPHEN 500 MG
650 TABLET ORAL EVERY 6 HOURS
Refills: 0 | Status: DISCONTINUED | OUTPATIENT
Start: 2024-01-01 | End: 2024-01-01

## 2024-01-01 RX ORDER — ACETAMINOPHEN 500 MG
2 TABLET ORAL
Qty: 0 | Refills: 0 | DISCHARGE

## 2024-01-01 RX ORDER — POTASSIUM PHOSPHATE, MONOBASIC POTASSIUM PHOSPHATE, DIBASIC 236; 224 MG/ML; MG/ML
30 INJECTION, SOLUTION INTRAVENOUS ONCE
Refills: 0 | Status: COMPLETED | OUTPATIENT
Start: 2024-01-01 | End: 2024-01-01

## 2024-01-01 RX ORDER — FAMOTIDINE 10 MG/ML
1 INJECTION INTRAVENOUS
Qty: 30 | Refills: 0
Start: 2024-01-01 | End: 2024-03-30

## 2024-01-01 RX ORDER — METOPROLOL TARTRATE 50 MG
1 TABLET ORAL
Qty: 30 | Refills: 0
Start: 2024-01-01 | End: 2024-03-30

## 2024-01-01 RX ORDER — PIPERACILLIN AND TAZOBACTAM 4; .5 G/20ML; G/20ML
3.38 INJECTION, POWDER, LYOPHILIZED, FOR SOLUTION INTRAVENOUS EVERY 12 HOURS
Refills: 0 | Status: DISCONTINUED | OUTPATIENT
Start: 2024-01-01 | End: 2024-01-01

## 2024-01-01 RX ORDER — METOPROLOL TARTRATE 50 MG
2.5 TABLET ORAL EVERY 12 HOURS
Refills: 0 | Status: DISCONTINUED | OUTPATIENT
Start: 2024-01-01 | End: 2024-01-01

## 2024-01-01 RX ORDER — LANOLIN ALCOHOL/MO/W.PET/CERES
1 CREAM (GRAM) TOPICAL
Qty: 30 | Refills: 0
Start: 2024-01-01 | End: 2024-03-30

## 2024-01-01 RX ORDER — LEVOTHYROXINE SODIUM 125 MCG
1 TABLET ORAL
Qty: 0 | Refills: 0 | DISCHARGE

## 2024-01-01 RX ORDER — FOLIC ACID 0.8 MG
1 TABLET ORAL
Qty: 0 | Refills: 0 | DISCHARGE

## 2024-01-01 RX ORDER — SODIUM CHLORIDE 9 MG/ML
1000 INJECTION, SOLUTION INTRAVENOUS
Refills: 0 | Status: DISCONTINUED | OUTPATIENT
Start: 2024-01-01 | End: 2024-01-01

## 2024-01-01 RX ORDER — LATANOPROST 0.05 MG/ML
1 SOLUTION/ DROPS OPHTHALMIC; TOPICAL AT BEDTIME
Refills: 0 | Status: DISCONTINUED | OUTPATIENT
Start: 2024-01-01 | End: 2024-01-01

## 2024-01-01 RX ORDER — DEXTROSE MONOHYDRATE, SODIUM CHLORIDE, AND POTASSIUM CHLORIDE 50; .745; 4.5 G/1000ML; G/1000ML; G/1000ML
1000 INJECTION, SOLUTION INTRAVENOUS
Refills: 0 | Status: DISCONTINUED | OUTPATIENT
Start: 2024-01-01 | End: 2024-01-01

## 2024-01-01 RX ORDER — MIRTAZAPINE 45 MG/1
7.5 TABLET, ORALLY DISINTEGRATING ORAL AT BEDTIME
Refills: 0 | Status: DISCONTINUED | OUTPATIENT
Start: 2024-01-01 | End: 2024-01-01

## 2024-01-01 RX ORDER — LEVOTHYROXINE SODIUM 125 MCG
1 TABLET ORAL
Qty: 30 | Refills: 0
Start: 2024-01-01 | End: 2024-03-30

## 2024-01-01 RX ORDER — FAMOTIDINE 10 MG/ML
1 INJECTION INTRAVENOUS
Refills: 0 | DISCHARGE

## 2024-01-01 RX ORDER — SODIUM CHLORIDE 9 MG/ML
1250 INJECTION INTRAMUSCULAR; INTRAVENOUS; SUBCUTANEOUS ONCE
Refills: 0 | Status: COMPLETED | OUTPATIENT
Start: 2024-01-01 | End: 2024-01-01

## 2024-01-01 RX ORDER — HEPARIN SODIUM 5000 [USP'U]/ML
5000 INJECTION INTRAVENOUS; SUBCUTANEOUS EVERY 12 HOURS
Refills: 0 | Status: DISCONTINUED | OUTPATIENT
Start: 2024-01-01 | End: 2024-01-01

## 2024-01-01 RX ORDER — LEVOTHYROXINE SODIUM 125 MCG
50 TABLET ORAL DAILY
Refills: 0 | Status: DISCONTINUED | OUTPATIENT
Start: 2024-01-01 | End: 2024-01-01

## 2024-01-01 RX ORDER — LOSARTAN POTASSIUM 100 MG/1
25 TABLET, FILM COATED ORAL DAILY
Refills: 0 | Status: DISCONTINUED | OUTPATIENT
Start: 2024-01-01 | End: 2024-01-01

## 2024-01-01 RX ORDER — METOPROLOL TARTRATE 50 MG
25 TABLET ORAL DAILY
Refills: 0 | Status: DISCONTINUED | OUTPATIENT
Start: 2024-01-01 | End: 2024-01-01

## 2024-01-01 RX ORDER — LEVOTHYROXINE SODIUM 125 MCG
25 TABLET ORAL AT BEDTIME
Refills: 0 | Status: DISCONTINUED | OUTPATIENT
Start: 2024-01-01 | End: 2024-01-01

## 2024-01-01 RX ORDER — ACETAMINOPHEN 500 MG
2 TABLET ORAL
Qty: 240 | Refills: 0
Start: 2024-01-01 | End: 2024-03-30

## 2024-01-01 RX ORDER — IPRATROPIUM/ALBUTEROL SULFATE 18-103MCG
3 AEROSOL WITH ADAPTER (GRAM) INHALATION EVERY 6 HOURS
Refills: 0 | Status: DISCONTINUED | OUTPATIENT
Start: 2024-01-01 | End: 2024-01-01

## 2024-01-01 RX ORDER — POTASSIUM CHLORIDE 20 MEQ
40 PACKET (EA) ORAL EVERY 4 HOURS
Refills: 0 | Status: DISCONTINUED | OUTPATIENT
Start: 2024-01-01 | End: 2024-01-01

## 2024-01-01 RX ORDER — POTASSIUM CHLORIDE 20 MEQ
40 PACKET (EA) ORAL ONCE
Refills: 0 | Status: DISCONTINUED | OUTPATIENT
Start: 2024-01-01 | End: 2024-01-01

## 2024-01-01 RX ORDER — BACILLUS COAGULANS/INULIN 1B-250 MG
1 CAPSULE ORAL
Qty: 30 | Refills: 0
Start: 2024-01-01 | End: 2024-03-30

## 2024-01-01 RX ORDER — HALOPERIDOL DECANOATE 100 MG/ML
0.5 INJECTION INTRAMUSCULAR AT BEDTIME
Refills: 0 | Status: DISCONTINUED | OUTPATIENT
Start: 2024-01-01 | End: 2024-01-01

## 2024-01-01 RX ORDER — POTASSIUM CHLORIDE 20 MEQ
20 PACKET (EA) ORAL
Refills: 0 | Status: COMPLETED | OUTPATIENT
Start: 2024-01-01 | End: 2024-01-01

## 2024-01-01 RX ORDER — FOLIC ACID 0.8 MG
1 TABLET ORAL
Qty: 30 | Refills: 0
Start: 2024-01-01 | End: 2024-03-30

## 2024-01-01 RX ORDER — BACILLUS COAGULANS/INULIN 1B-250 MG
1 CAPSULE ORAL
Refills: 0 | DISCHARGE

## 2024-01-01 RX ORDER — HALOPERIDOL DECANOATE 100 MG/ML
1 INJECTION INTRAMUSCULAR
Qty: 30 | Refills: 0
Start: 2024-01-01 | End: 2024-03-30

## 2024-01-01 RX ORDER — MIRTAZAPINE 45 MG/1
1 TABLET, ORALLY DISINTEGRATING ORAL
Refills: 0 | DISCHARGE

## 2024-01-01 RX ORDER — TRAVOPROST 0.04 MG/ML
1 SOLUTION/ DROPS OPHTHALMIC
Qty: 0 | Refills: 0 | DISCHARGE

## 2024-01-01 RX ORDER — CEFTRIAXONE 500 MG/1
1000 INJECTION, POWDER, FOR SOLUTION INTRAMUSCULAR; INTRAVENOUS EVERY 24 HOURS
Refills: 0 | Status: DISCONTINUED | OUTPATIENT
Start: 2024-01-01 | End: 2024-01-01

## 2024-01-01 RX ORDER — TRAVOPROST 0.04 MG/ML
1 SOLUTION/ DROPS OPHTHALMIC
Qty: 1 | Refills: 0
Start: 2024-01-01 | End: 2024-03-30

## 2024-01-01 RX ADMIN — HEPARIN SODIUM 5000 UNIT(S): 5000 INJECTION INTRAVENOUS; SUBCUTANEOUS at 21:19

## 2024-01-01 RX ADMIN — LOSARTAN POTASSIUM 25 MILLIGRAM(S): 100 TABLET, FILM COATED ORAL at 05:36

## 2024-01-01 RX ADMIN — HEPARIN SODIUM 5000 UNIT(S): 5000 INJECTION INTRAVENOUS; SUBCUTANEOUS at 17:52

## 2024-01-01 RX ADMIN — LOSARTAN POTASSIUM 25 MILLIGRAM(S): 100 TABLET, FILM COATED ORAL at 06:20

## 2024-01-01 RX ADMIN — Medication 1 MILLIGRAM(S): at 17:17

## 2024-01-01 RX ADMIN — Medication 30 MILLIGRAM(S): at 13:52

## 2024-01-01 RX ADMIN — Medication 30 MILLIGRAM(S): at 12:25

## 2024-01-01 RX ADMIN — LATANOPROST 1 DROP(S): 0.05 SOLUTION/ DROPS OPHTHALMIC; TOPICAL at 20:02

## 2024-01-01 RX ADMIN — HEPARIN SODIUM 5000 UNIT(S): 5000 INJECTION INTRAVENOUS; SUBCUTANEOUS at 21:33

## 2024-01-01 RX ADMIN — SODIUM CHLORIDE 70 MILLILITER(S): 9 INJECTION, SOLUTION INTRAVENOUS at 17:18

## 2024-01-01 RX ADMIN — Medication 50 MICROGRAM(S): at 05:36

## 2024-01-01 RX ADMIN — Medication 25 MILLIGRAM(S): at 06:08

## 2024-01-01 RX ADMIN — HEPARIN SODIUM 5000 UNIT(S): 5000 INJECTION INTRAVENOUS; SUBCUTANEOUS at 05:34

## 2024-01-01 RX ADMIN — HEPARIN SODIUM 5000 UNIT(S): 5000 INJECTION INTRAVENOUS; SUBCUTANEOUS at 10:29

## 2024-01-01 RX ADMIN — Medication 25 MICROGRAM(S): at 21:51

## 2024-01-01 RX ADMIN — LATANOPROST 1 DROP(S): 0.05 SOLUTION/ DROPS OPHTHALMIC; TOPICAL at 22:06

## 2024-01-01 RX ADMIN — HEPARIN SODIUM 5000 UNIT(S): 5000 INJECTION INTRAVENOUS; SUBCUTANEOUS at 06:08

## 2024-01-01 RX ADMIN — PIPERACILLIN AND TAZOBACTAM 200 GRAM(S): 4; .5 INJECTION, POWDER, LYOPHILIZED, FOR SOLUTION INTRAVENOUS at 14:19

## 2024-01-01 RX ADMIN — Medication 1 TABLET(S): at 17:17

## 2024-01-01 RX ADMIN — HEPARIN SODIUM 5000 UNIT(S): 5000 INJECTION INTRAVENOUS; SUBCUTANEOUS at 10:40

## 2024-01-01 RX ADMIN — DEXTROSE MONOHYDRATE, SODIUM CHLORIDE, AND POTASSIUM CHLORIDE 60 MILLILITER(S): 50; .745; 4.5 INJECTION, SOLUTION INTRAVENOUS at 10:28

## 2024-01-01 RX ADMIN — Medication 25 MICROGRAM(S): at 22:04

## 2024-01-01 RX ADMIN — Medication 1 MILLIGRAM(S): at 12:24

## 2024-01-01 RX ADMIN — CEFTRIAXONE 100 MILLIGRAM(S): 500 INJECTION, POWDER, FOR SOLUTION INTRAMUSCULAR; INTRAVENOUS at 06:37

## 2024-01-01 RX ADMIN — HEPARIN SODIUM 5000 UNIT(S): 5000 INJECTION INTRAVENOUS; SUBCUTANEOUS at 11:38

## 2024-01-01 RX ADMIN — LOSARTAN POTASSIUM 25 MILLIGRAM(S): 100 TABLET, FILM COATED ORAL at 06:08

## 2024-01-01 RX ADMIN — Medication 2.5 MILLIGRAM(S): at 17:45

## 2024-01-01 RX ADMIN — Medication 50 MILLIEQUIVALENT(S): at 16:30

## 2024-01-01 RX ADMIN — LATANOPROST 1 DROP(S): 0.05 SOLUTION/ DROPS OPHTHALMIC; TOPICAL at 21:10

## 2024-01-01 RX ADMIN — Medication 2.5 MILLIGRAM(S): at 05:26

## 2024-01-01 RX ADMIN — Medication 2.5 MILLIGRAM(S): at 05:05

## 2024-01-01 RX ADMIN — Medication 1 TABLET(S): at 13:08

## 2024-01-01 RX ADMIN — Medication 50 MICROGRAM(S): at 06:08

## 2024-01-01 RX ADMIN — Medication 1 TABLET(S): at 13:51

## 2024-01-01 RX ADMIN — LATANOPROST 1 DROP(S): 0.05 SOLUTION/ DROPS OPHTHALMIC; TOPICAL at 21:56

## 2024-01-01 RX ADMIN — Medication 50 MILLIEQUIVALENT(S): at 14:34

## 2024-01-01 RX ADMIN — HEPARIN SODIUM 5000 UNIT(S): 5000 INJECTION INTRAVENOUS; SUBCUTANEOUS at 06:20

## 2024-01-01 RX ADMIN — HEPARIN SODIUM 5000 UNIT(S): 5000 INJECTION INTRAVENOUS; SUBCUTANEOUS at 22:40

## 2024-01-01 RX ADMIN — SODIUM CHLORIDE 60 MILLILITER(S): 9 INJECTION, SOLUTION INTRAVENOUS at 22:20

## 2024-01-01 RX ADMIN — POTASSIUM PHOSPHATE, MONOBASIC POTASSIUM PHOSPHATE, DIBASIC 83.33 MILLIMOLE(S): 236; 224 INJECTION, SOLUTION INTRAVENOUS at 14:35

## 2024-01-01 RX ADMIN — Medication 2.5 MILLIGRAM(S): at 18:29

## 2024-01-01 RX ADMIN — Medication 50 MICROGRAM(S): at 06:20

## 2024-01-01 RX ADMIN — Medication 50 MICROGRAM(S): at 05:14

## 2024-01-01 RX ADMIN — Medication 25 MICROGRAM(S): at 21:33

## 2024-01-01 RX ADMIN — PIPERACILLIN AND TAZOBACTAM 25 GRAM(S): 4; .5 INJECTION, POWDER, LYOPHILIZED, FOR SOLUTION INTRAVENOUS at 18:27

## 2024-01-01 RX ADMIN — Medication 25 MICROGRAM(S): at 22:40

## 2024-01-01 RX ADMIN — Medication 1 MILLIGRAM(S): at 13:08

## 2024-01-01 RX ADMIN — Medication 30 MILLIGRAM(S): at 13:08

## 2024-01-01 RX ADMIN — Medication 25 MILLIGRAM(S): at 05:14

## 2024-01-01 RX ADMIN — HEPARIN SODIUM 5000 UNIT(S): 5000 INJECTION INTRAVENOUS; SUBCUTANEOUS at 18:35

## 2024-01-01 RX ADMIN — Medication 3 MILLILITER(S): at 23:02

## 2024-01-01 RX ADMIN — SODIUM CHLORIDE 60 MILLILITER(S): 9 INJECTION, SOLUTION INTRAVENOUS at 11:43

## 2024-01-01 RX ADMIN — Medication 1 TABLET(S): at 12:24

## 2024-01-01 RX ADMIN — CEFTRIAXONE 100 MILLIGRAM(S): 500 INJECTION, POWDER, FOR SOLUTION INTRAMUSCULAR; INTRAVENOUS at 05:38

## 2024-01-01 RX ADMIN — Medication 25 MILLIGRAM(S): at 05:36

## 2024-01-01 RX ADMIN — LOSARTAN POTASSIUM 25 MILLIGRAM(S): 100 TABLET, FILM COATED ORAL at 05:14

## 2024-01-01 RX ADMIN — HEPARIN SODIUM 5000 UNIT(S): 5000 INJECTION INTRAVENOUS; SUBCUTANEOUS at 17:18

## 2024-01-01 RX ADMIN — Medication 2.5 MILLIGRAM(S): at 17:57

## 2024-01-01 RX ADMIN — LATANOPROST 1 DROP(S): 0.05 SOLUTION/ DROPS OPHTHALMIC; TOPICAL at 21:19

## 2024-01-01 RX ADMIN — LATANOPROST 1 DROP(S): 0.05 SOLUTION/ DROPS OPHTHALMIC; TOPICAL at 22:42

## 2024-01-01 RX ADMIN — HEPARIN SODIUM 5000 UNIT(S): 5000 INJECTION INTRAVENOUS; SUBCUTANEOUS at 05:14

## 2024-01-01 RX ADMIN — HEPARIN SODIUM 5000 UNIT(S): 5000 INJECTION INTRAVENOUS; SUBCUTANEOUS at 22:04

## 2024-01-01 RX ADMIN — CEFTRIAXONE 100 MILLIGRAM(S): 500 INJECTION, POWDER, FOR SOLUTION INTRAMUSCULAR; INTRAVENOUS at 05:26

## 2024-01-01 RX ADMIN — SODIUM CHLORIDE 1250 MILLILITER(S): 9 INJECTION INTRAMUSCULAR; INTRAVENOUS; SUBCUTANEOUS at 13:10

## 2024-01-01 RX ADMIN — LATANOPROST 1 DROP(S): 0.05 SOLUTION/ DROPS OPHTHALMIC; TOPICAL at 21:33

## 2024-01-01 RX ADMIN — Medication 1 MILLIGRAM(S): at 13:52

## 2024-01-01 RX ADMIN — HALOPERIDOL DECANOATE 0.5 MILLIGRAM(S): 100 INJECTION INTRAMUSCULAR at 21:56

## 2024-01-01 RX ADMIN — DEXTROSE MONOHYDRATE, SODIUM CHLORIDE, AND POTASSIUM CHLORIDE 60 MILLILITER(S): 50; .745; 4.5 INJECTION, SOLUTION INTRAVENOUS at 05:05

## 2024-01-01 RX ADMIN — Medication 3 MILLILITER(S): at 17:25

## 2024-01-01 RX ADMIN — LATANOPROST 1 DROP(S): 0.05 SOLUTION/ DROPS OPHTHALMIC; TOPICAL at 22:09

## 2024-01-01 RX ADMIN — HALOPERIDOL DECANOATE 0.5 MILLIGRAM(S): 100 INJECTION INTRAMUSCULAR at 20:01

## 2024-01-01 RX ADMIN — HALOPERIDOL DECANOATE 0.5 MILLIGRAM(S): 100 INJECTION INTRAMUSCULAR at 22:09

## 2024-01-01 RX ADMIN — SODIUM CHLORIDE 70 MILLILITER(S): 9 INJECTION, SOLUTION INTRAVENOUS at 23:01

## 2024-01-01 RX ADMIN — Medication 30 MILLIGRAM(S): at 17:17

## 2024-01-01 RX ADMIN — Medication 25 MILLIGRAM(S): at 06:20

## 2024-02-23 NOTE — ED PROVIDER NOTE - CLINICAL SUMMARY MEDICAL DECISION MAKING FREE TEXT BOX
Morgan Gonzales MD, PGY2  89-year-old female with hypertension, hyperlipidemia, dementia presenting to emergency department brought in by EMS from [facility] for reported lethargy and weakness earlier today.  In the emergency department patient states she feels well, is alert and oriented x 3.  Not endorsing any pain at this time.  Able to stand with assistance however at baseline and wheelchair-bound at assisted living center.  No recent fever.  Denies cough, chest pain, shortness of breath, abdominal pain, nausea, vomiting, diarrhea, dysuria, rash.  No recent foreign travel.    In the emergency department patient is well-appearing, in no acute distress.  Patient hemodynamically stable, afebrile.  Patient with normal nonfocal neurologic examination, moving all 4 extremities.  Patient answering questions appropriately and is alert and oriented x 3.  Patient appears to be at baseline.  Discussed with patient's healthcare proxy Katlyn Brumfield at [phone].  Who states if no emergent intervention necessary would prefer patient be sent back to [facility] if at her baseline, would like to avoid IV insertion/blood work/additional testing.  Will obtain flu COVID swab, point-of-care fingerstick glucose, chest x-ray, EKG.  Will attempt to feed patient and observe in the emergency department.  If workup unremarkable will call for nonemergent transportation to take patient back to assisted living.

## 2024-02-23 NOTE — ED PROVIDER NOTE - PHYSICAL EXAMINATION
Gen: WDWN, NAD  HEENT: EOMI, PERRLA, mucous membranes moist  CV: RRR, +S1/S2, no M/R/G, 2+ radial pulses b/l  Resp: CTAB, no W/R/R, no accessory muscle use, no increased work of breathing  GI: Abdomen soft non-distended, NTTP  MSK:  no LE edema, moving all four extremities. neurovascularly intact distally all four extremities.   Neuro: A&Ox4, following commands, moving all four extremities spontaneously  Psych: appropriate mood

## 2024-02-23 NOTE — ED PROVIDER NOTE - PATIENT PORTAL LINK FT
You can access the FollowMyHealth Patient Portal offered by [facility] by registering at the following website: [email or web address]. By joining [facility]’s FollowMyHealth portal, you will also be able to view your health information using other applications (apps) compatible with our system.

## 2024-02-23 NOTE — ED PROVIDER NOTE - ATTENDING CONTRIBUTION TO CARE
------------ATTENDING NOTE------------  pt brought to ED by EMS from nursing facility c/o several days of nasal congestion, clear rhinorrhea, unproductive cough, no fevers, no underlying lung disease, complicated by baseline dementia, awaiting labs/imaging and close reassessments -->  - Sergio Burns MD   --------------------------------------------- ------------ATTENDING NOTE------------  pt brought to ED by EMS from nursing facility c/o several days of nasal congestion, clear rhinorrhea, unproductive cough, no fevers, no underlying lung disease, complicated by baseline dementia, awaiting labs/imaging and close reassessments --> CXR clear, no hypoxia, nml VS, tolerating PO, nml VS, in depth dw all about ddx, tx, curtis, continued close outpfu.  - Sergio Burns MD   ---------------------------------------------

## 2024-02-23 NOTE — ED PROVIDER NOTE - PRINCIPAL DIAGNOSIS
[History reviewed] : History reviewed. [Medications and Allergies reviewed] : Medications and allergies reviewed. URI with cough and congestion Influenza A

## 2024-02-23 NOTE — ED ADULT NURSE NOTE - NSFALLRISKINTERV_ED_ALL_ED

## 2024-02-23 NOTE — ED PROVIDER NOTE - NSFOLLOWUPINSTRUCTIONS_ED_ALL_ED_FT
See your Primary Doctor next week for follow up -- call to discuss.    Stay well hydrated, eat regular healthy meals, get plenty of rest, continue current medications.    Use Acetaminophen as directed for pain/fever -- see medication warnings.    See INFLUENZA information and return instructions given to you.    Seek immediate medical care for new/worsening symptoms/concerns.

## 2024-02-24 PROBLEM — F03.90 UNSPECIFIED DEMENTIA WITHOUT BEHAVIORAL DISTURBANCE: Chronic | Status: ACTIVE | Noted: 2023-01-01

## 2024-02-24 PROBLEM — I10 ESSENTIAL (PRIMARY) HYPERTENSION: Chronic | Status: ACTIVE | Noted: 2023-01-01

## 2024-02-24 PROBLEM — G31.84 MILD COGNITIVE IMPAIRMENT OF UNCERTAIN OR UNKNOWN ETIOLOGY: Chronic | Status: ACTIVE | Noted: 2023-01-01

## 2024-02-24 PROBLEM — Z86.39 PERSONAL HISTORY OF OTHER ENDOCRINE, NUTRITIONAL AND METABOLIC DISEASE: Chronic | Status: ACTIVE | Noted: 2023-01-01

## 2024-02-24 PROBLEM — H91.90 UNSPECIFIED HEARING LOSS, UNSPECIFIED EAR: Chronic | Status: ACTIVE | Noted: 2023-01-01

## 2024-02-26 NOTE — ED ADULT NURSE REASSESSMENT NOTE - NS ED NURSE REASSESS COMMENT FT1
Patient straight cathed for urine using sterile technique. Second RN present to confirm sterility. Explained procedure as it was being done - Pt tolerated procedure well. Sterile specimens collected and sent to lab as ordered. drained aprox 100 ml of urine. Comfort and safety provided. UA/UC collected and sent to lab as per MD order
Pt RTM Tele awaiting bed placement, pt remains A&Ox2, showing no signs of acute distress, respirations even and unlabored

## 2024-02-26 NOTE — ED ADULT NURSE NOTE - NSFALLHARMRISKINTERV_ED_ALL_ED
Assistance OOB with selected safe patient handling equipment if applicable/Assistance with ambulation/Communicate risk of Fall with Harm to all staff, patient, and family/Monitor gait and stability/Monitor for mental status changes and reorient to person, place, and time, as needed/Move patient closer to nursing station/within visual sight of ED staff/Provide patient with walking aids/Provide visual cue: red socks, yellow wristband, yellow gown, etc/Reinforce activity limits and safety measures with patient and family/Toileting schedule using arm’s reach rule for commode and bathroom/Use of alarms - bed, stretcher, chair and/or video monitoring/Bed in lowest position, wheels locked, appropriate side rails in place/Call bell, personal items and telephone in reach/Instruct patient to call for assistance before getting out of bed/chair/stretcher/Non-slip footwear applied when patient is off stretcher/[place] to call system/Physically safe environment - no spills, clutter or unnecessary equipment/Purposeful Proactive Rounding/Room/bathroom lighting operational, light cord in reach

## 2024-02-26 NOTE — ED PROVIDER NOTE - CLINICAL SUMMARY MEDICAL DECISION MAKING FREE TEXT BOX
[place], DO (PGY1): 89-year-old female, history of hypertension, hyperlipidemia, dementia, presented to the emergency department today for an episode of unresponsiveness to sternal rub.  Per EMS, when they the patient, patient was responsive when her name was called.  Patient currently stating that she feels well and is just tired.  Reports a cough but denies any other symptoms including fever, chest pain, shortness of breath, abdominal pain, urinary symptoms.  Of note, patient was seen in the emergency department here at [place] for 3 days ago, was found to be positive for the flu.     On exam, patient is lying in stretcher, she looks pale and cachectic but she is in no apparent distress.  Her vitals are stable.  She is afebrile.  Satting at 95% on room air.  Her lungs are clear to auscultation bilaterally.  She has normal heart sounds.  No abdominal tenderness.  + Pale conjunctiva.  Patient symptoms likely due to the flu. However other differential diagnoses include, but are not limited to pneumonia, UTI, cardiac issues, anemia. Will  evaluate with CBC, CMP, VBG, viral swab, UA, chest xray, EKG. Dispo pending results.

## 2024-02-26 NOTE — ED ADULT TRIAGE NOTE - CHIEF COMPLAINT QUOTE
per nursing home patient unresponsive to sternal rub, ems arrival patient responsive to verbal stimuli  patient has no complaints, states she is tired

## 2024-02-26 NOTE — ED ADULT NURSE NOTE - OBJECTIVE STATEMENT
patient is a 89 y/o F with hx of dementia, thyroid disease, HTN, and mild cognitive impairment BIBEMS from the [facility] for episode of unresponsiveness. per EMS at her nursing home patient was "briefly unresponsive to sternal rub" which prompted them to call EMS. upon EMS arrival patient back to baseline mental status. patient recently seen in [facility] ED for flu. patient is a&ox2 (to self and place). respirations even and unlabored. abdomen soft, nondistended. skin intact. patient repeatedly stating "Im just tired and want to sleep". FS 96. 20 G IV placed in right bicep. MD [place] at bedside to assess. bed locked and placed in lowest position. side rails up. comfort and safety maintained

## 2024-02-26 NOTE — ED PROVIDER NOTE - TOBACCO USE
Unknown if ever smoked Purse String (Simple) Text: Given the location of the defect and the characteristics of the surrounding skin a purse string simple closure was deemed most appropriate.  Undermining was performed circumferentially around the surgical defect.  A purse string suture was then placed and tightened.

## 2024-02-26 NOTE — ED PROVIDER NOTE - ATTENDING CONTRIBUTION TO CARE
Jaime Bucio DO: I have personally performed a face to face medical and diagnostic evaluation of the patient. I have discussed with and reviewed the Resident's and/or ACP's and/or Medical/PA/NP student's note and agree with the History, ROS, Physical Exam and MDM unless otherwise indicated. A brief summary of my personal evaluation and impression can be found below.     hpi above    CONSTITUTIONAL: pale appearing  SKIN: Warm dry, normal skin turgor  HEAD: NCAT  ENT: normal pharynx with no erythema or exudates  NECK: Supple; non tender. Full ROM.  CARD: RRR, no murmurs.  RESP: b/l basilar brackles  ABD: soft, non-tender, non-distended, no rebound or guarding.    Pt recently found to be influenza positive now p.w hypoxia, cxr ro superinfection, will check labs, cxr, pt doesn't meet sirs criteria at this time, will get cbc as pt is pale appearing although no reportd history of bloody/dark stools, less likely metabolic or vascular cause of symptoms, pt will need to be admitted for hpyoxia, consider treating for CAP based on imaging and clinical status.

## 2024-02-26 NOTE — ED PROVIDER NOTE - PROGRESS NOTE DETAILS
Leydricah [place], DO (PGY1): Patient positive for flu. labs largely non-actionable. CXR negative for PNA. Patient satting at 95 on 2L NC. Spoke with Dr Oliveira. Patient to be admitted for new O2 requirement.

## 2024-02-26 NOTE — ED ADULT NURSE NOTE - CAS TRG GEN SKIN CONDITION
Subjective:      Patient ID: Aaliyah Angela is a 66 y.o. female.    Chief Complaint: Swelling and Pain of the Left Hand and Swelling and Pain of the Right Hand      HPI  Aaliyah Angela is a  66 y.o. female with DMII presenting today for evaluation of the bilateral hands. She has pain and triggering of the bilateral ring fingers. She has not yet tried anything. In addition she has pain at the right radial wrist, first dorsal compartment, increased with use. Some pain at the base of the right thumb as well. She works as a nurse at [email or web address].     10/5/23   F/u bl ring trigger fingers and R dequervains. Trigger finger injections performed last visit with some relief but does still have pain and triggering. She also has pain at the right long A1 pulley. Continues to have right dequervains symptoms also. She has been using the brace intermittently. She reports a lot of pain at the right radial wrist with use.       Review of patient's allergies indicates:  No Known Allergies      Current Outpatient Medications   Medication Sig Dispense Refill    acetaminophen (TYLENOL) 650 MG TbSR Take 1 tablet (650 mg total) by mouth every 8 (eight) hours as needed (pain). 30 tablet 0    amitriptyline (ELAVIL) 25 MG tablet TAKE 2 TABLETS AT BEDTIME 180 tablet 3    amLODIPine (NORVASC) 10 MG tablet TAKE 1 TABLET BY MOUTH ONCE A DAY 90 tablet 2    aspirin 81 MG Chew Take 81 mg by mouth once daily.      atorvastatin (LIPITOR) 20 MG tablet TAKE 1 TABLET(20 MG) BY MOUTH EVERY DAY 90 tablet 3    blood sugar diagnostic Strp To check BG 1-2 times daily, to use with insurance preferred meter 200 each 3    blood-glucose meter,continuous (DEXCOM G6 ) Misc Use as directed, e 11.65 1 each 1    blood-glucose sensor (DEXCOM G6 SENSOR) Carley Change every 10 days. E 11.65 3 each 11    blood-glucose transmitter (DEXCOM G6 TRANSMITTER) Carley Change every 3 months 1 each 3    calcium-vitamin D3 500 mg(1,250mg) -200 unit per tablet Take 1  tablet by mouth once daily.      cephALEXin (KEFLEX) 500 MG capsule Take 500 mg by mouth every 8 (eight) hours.      empagliflozin (JARDIANCE) 25 mg tablet TAKE 1 TABLET BY MOUTH DAILY 90 tablet 3    esomeprazole (NEXIUM) 40 MG capsule Take 1 capsule (40 mg total) by mouth once daily. 90 capsule 3    FLOWFLEX COVID-19 AG HOME TEST Kit FOLLOW INSTRUCTIONS INCLUDED WITH THE PACKAGE.      insulin lispro (HUMALOG KWIKPEN INSULIN) 100 unit/mL pen Use as directed based on sugar level, 150-200+2, 201-250+4, 251-300+6, 301-350+8, >350+10. Max daily 30 units. 1 Box 6    losartan (COZAAR) 100 MG tablet Take 1 tablet (100 mg total) by mouth once daily. 90 tablet 3    metFORMIN (GLUCOPHAGE-XR) 500 MG ER 24hr tablet TAKE 2 TABLETS TWICE TWICE A  tablet 0    metoprolol succinate (TOPROL-XL) 50 MG 24 hr tablet TAKE 1 TABLET BY MOUTH EVERY DAY 90 tablet 3    multivitamin (THERAGRAN) per tablet Take 1 tablet by mouth once daily.      ondansetron (ZOFRAN) 4 MG tablet Take 1 tablet (4 mg total) by mouth 2 (two) times daily as needed for Nausea. 20 tablet 0    semaglutide (OZEMPIC) 2 mg/dose (8 mg/3 mL) PnIj Inject 2 mg into the skin every 7 days. 9 mL 3     Current Facility-Administered Medications   Medication Dose Route Frequency Provider Last Rate Last Admin    [COMPLETED] LIDOcaine (PF) 10 mg/ml (1%) injection 10 mg  1 mL Other Once Priya Denton PA-C   10 mg at 10/05/23 1100    [COMPLETED] LIDOcaine (PF) 10 mg/ml (1%) injection 5 mg  0.5 mL Other Once Priya Denton PA-C   5 mg at 10/05/23 1100    [COMPLETED] LIDOcaine (PF) 10 mg/ml (1%) injection 5 mg  0.5 mL Other Once Priya Denton PA-C   5 mg at 10/05/23 1100     Facility-Administered Medications Ordered in Other Visits   Medication Dose Route Frequency Provider Last Rate Last Admin    0.9%  NaCl infusion   Intravenous Continuous Aristeo Bueno  mL/hr at 02/17/21 0754 New Bag at 02/17/21 0754       Past Medical History:   Diagnosis Date    Allergy      Anatomical narrow angle borderline glaucoma     AR (allergic rhinitis)     Arthritis     CAD (coronary artery disease) 2013    Non obstructive, 2012 PET     Diabetes mellitus type II     with retinopathy    Diabetic retinopathy     Epiretinal membrane, both eyes     Hyperlipidemia     Hypertensive retinopathy of both eyes     Lumbar disc disease     Migraines     Morbid obesity 10/03/2012    [place] (obstructive sleep apnea) 2015    Proliferative diabetic retinopathy(362.02)     PVD (peripheral vascular disease) 2013    Carotid US 1-39% bilat      Rupture of right Achilles tendon     Unspecified essential hypertension     Unspecified vitamin D deficiency     Vitreous hemorrhage of left eye        Past Surgical History:   Procedure Laterality Date    CARPAL TUNNEL RELEASE Right 2020    Procedure: RELEASE, CARPAL TUNNEL RIGHT;  Surgeon: Tameka Bran MD;  Location: [facility];  Service: Orthopedics;  Laterality: Right;    CARPAL TUNNEL RELEASE Right 2020    CATARACT EXTRACTION W/  INTRAOCULAR LENS IMPLANT Left 10/28/2020    COMPLEX ()    CATARACT EXTRACTION W/  INTRAOCULAR LENS IMPLANT Right 2021         SECTION      x3    COLONOSCOPY      COLONOSCOPY N/A 10/08/2020    Procedure: COLONOSCOPY;  Surgeon: Tima Talley MD;  Location: [facility] (4TH FLR);  Service: Endoscopy;  Laterality: N/A;    ESOPHAGOGASTRODUODENOSCOPY N/A 10/08/2020    Procedure: EGD (ESOPHAGOGASTRODUODENOSCOPY);  Surgeon: Tima Talley MD;  Location: [facility] (4TH FLR);  Service: Endoscopy;  Laterality: N/A;    GASTRIC BYPASS      Sleeve    INTRAOCULAR PROSTHESES INSERTION Left 10/28/2020    Procedure: INSERTION, IOL PROSTHESIS;  Surgeon: Alma Goodrich MD;  Location: [facility] 1ST FLR;  Service: Ophthalmology;  Laterality: Left;    INTRAOCULAR PROSTHESES INSERTION Right 2021    Procedure: INSERTION, IOL PROSTHESIS;  Surgeon: Alma Goodrich MD;  Location: [facility]  "OR 1ST FLR;  Service: Ophthalmology;  Laterality: Right;    LASER PERIPHERAL IRIDOTOMY Bilateral 1/26/2017 and 2/9/2017        PANRETINAL PHOTOCOAGULATION      Both eyes    PHACOEMULSIFICATION OF CATARACT Left 10/28/2020    Procedure: PHACOEMULSIFICATION, CATARACT;  Surgeon: Alma Goodrich MD;  Location: [facility] OR [address] [place] [address];  Service: Ophthalmology;  Laterality: Left;    PHACOEMULSIFICATION OF CATARACT Right 02/17/2021    Procedure: PHACOEMULSIFICATION, CATARACT;  Surgeon: Alma Goodrich MD;  Location: [facility] OR [address] [place] [address];  Service: Ophthalmology;  Laterality: Right;    TUBAL LIGATION         Review of Systems:  Constitutional: Negative for chills and fever.   Respiratory: Negative for cough and shortness of breath.    Gastrointestinal: Negative for nausea and vomiting.   Skin: Negative for rash.   Neurological: Negative for dizziness and headaches.   Psychiatric/Behavioral: Negative for depression.   MSK as in HPI       OBJECTIVE:     PHYSICAL EXAM:  Ht 5' 6" (1.676 m)   Wt 112 kg (246 lb 14.6 oz)   BMI 39.85 kg/m²     GEN:  NAD, well-developed, well-groomed.  NEURO: Awake, alert, and oriented. Normal attention and concentration.    PSYCH: Normal mood and affect. Behavior is normal.  HEENT: No cervical lymphadenopathy noted.  CARDIOVASCULAR: Radial pulses 2+ bilaterally. No LE edema noted.  PULMONARY: Breath sounds normal. No respiratory distress.  SKIN: Intact, no rashes.      MSK:   BUE:  Good active ROM of the wrist and fingers. Ttp bl ring A1 pulleys with triggering. Ttp right first dorsal compartment and right thumb CMC. AIN/PIN/Radial/Median/Ulnar Nerves assessed in isolation without deficit. Radial & Ulnar arteries palpated 2+. Capillary Refill <3s.    RADIOGRAPHS:  Xray right hand 8/24/23   Impression:   No acute osseous abnormality identified.  Comments: I have personally reviewed the imaging and I agree with the above radiologist's report.    ASSESSMENT/PLAN:       ICD-10-CM ICD-9-CM "   1. Trigger ring finger, unspecified laterality  M65.349 727.03   2. De Quervain's tenosynovitis, right  M65.4 727.04         Orders Placed This Encounter    Ambulatory referral/consult to Physical/Occupational Therapy    LIDOcaine (PF) 10 mg/ml (1%) injection 10 mg    LIDOcaine (PF) 10 mg/ml (1%) injection 5 mg    LIDOcaine (PF) 10 mg/ml (1%) injection 5 mg    dexAMETHasone injection 4 mg    dexAMETHasone injection 4 mg    dexAMETHasone injection 4 mg       Orders Placed This Encounter   Procedures    Ambulatory referral/consult to Physical/Occupational Therapy     Plan:   Treatment options discussed plan for repeat bl ring trigger finger injections and right dequervains injection   Therapy ordered  Continue thumb spica brace   RTC 6-8 wks if symptoms persist plan for right dequervains release, right ring trigger finger release, and possible right long trigger finger release       PROCEDURE:  I have explained the risks, benefits, and alternatives of the procedure in detail.  The patient voices understanding and all questions have been answered.  The patient agrees to proceed as planned. US used. Verbal consent obtained. So after a sterile prep of the skin in the normal fashion the bilateral ring flexor tendon sheath is injected from the volar  approach using a 25 gauge needle with a combination of 0.5cc 1% plain lidocaine and 4 mg of dexamethasone.  The patient is cautioned and immediate relief of pain is secondary to the local anesthetic and will be temporary.  After the anesthetic wears off there may be a increase in pain that may last for a few hours or a few days and they should use ice to help alleviate this flair up of pain. Patient tolerated the procedure well.       PROCEDURE:  I have explained the risks, benefits, and alternatives of the procedure in detail.  The patient voices understanding and all questions have been answered.  The patient agrees to proceed as planned. US used. Verbal consent obtained.  So after a sterile prep of the skin in the normal fashion the right first dorsal compartment is injected from the volar  approach using a 25 gauge needle with a combination of 1cc 1% plain lidocaine and 4 mg of dexamethasone.  The patient is cautioned and immediate relief of pain is secondary to the local anesthetic and will be temporary.  After the anesthetic wears off there may be a increase in pain that may last for a few hours or a few days and they should use ice to help alleviate this flair up of pain. Patient tolerated the procedure well.     The patient indicates understanding of these issues and agrees to the plan.    Priya Denton PA-C  [facility]   [facility]  [place], [place]       Warm/Dry

## 2024-02-26 NOTE — ED PROVIDER NOTE - PHYSICAL EXAMINATION
GENERAL: no acute distress, cachetic-looking  HEAD: normocephalic, atraumatic  HEENT: dry mucous membranes, pale conjunctiva, full ROM of neck  CARDIAC: regular rate rhythm, normal S1/S2  CHEST: CTA BL, no wheeze or crackles  ABDOMEN: normal BS, soft, no tenderness  EXTREMITY: no gross deformity, no edema, good perfusion  NEURO: alert and orientedx3

## 2024-02-27 NOTE — H&P ADULT - ASSESSMENT
89-year-old female, history of hypertension, hyperlipidemia, dementia, presented to the emergency department today for an episode of unresponsiveness to sternal rub.  Per EMS, when they the patient, patient was responsive when her name was called.  Patient currently stating that she feels well and is just tired.  Reports a cough but denies any other symptoms including fever, chest pain, shortness of breath, abdominal pain, urinary symptoms.  Of note, patient was seen in the emergency department here at [place] for 3 days ago, was found to be positive for the flu.  pt is well known to me with hx of dementia, sss, s/p ppm , ?paf, PVD who presented to er with syncope and unresponsiveness  tele  check  ppm  continue bp meds  r/o sepsis  dvt prophylaxis  continue psych meds sec to underlying dementia  pvd ?adding  asa daily  check tsh/ b12   89-year-old female, history of hypertension, hyperlipidemia, dementia, presented to the emergency department today for an episode of unresponsiveness to sternal rub.  Per EMS, when they the patient, patient was responsive when her name was called.  Patient currently stating that she feels well and is just tired.  Reports a cough but denies any other symptoms including fever, chest pain, shortness of breath, abdominal pain, urinary symptoms.  Of note, patient was seen in the emergency department here at [place] for 3 days ago, was found to be positive for the flu.  pt is well known to me with hx of dementia, sss, s/p ppm , ?paf, PVD who presented to er with syncope and unresponsiveness  tele  check  ppm  continue bp meds  r/o sepsis  dvt prophylaxis  continue psych meds sec to underlying dementia  pvd ?adding  asa daily  check tsh/ b12  influenza a continue tamiflu

## 2024-02-27 NOTE — H&P ADULT - NSHPLABSRESULTS_GEN_ALL_CORE
Urinalysis + Microscopic Examination (02.26.24 @ 18:44)    pH Urine: 6.0    Urine Appearance: Clear    Color: Dark Yellow    Specific Gravity: 1.023    Protein, Urine: 30 mg/dL    Glucose Qualitative, Urine: Negative mg/dL    Ketone - Urine: Trace mg/dL    Blood, Urine: Negative    Bilirubin: Negative    Urobilinogen: 0.2 mg/dL    Leukocyte Esterase Concentration: Negative    Nitrite: Negative    White Blood Cell - Urine: 1 /HPF    Red Blood Cell - Urine: 2 /HPF    Bacteria: Negative /HPF    Cast: 0 /LPF    Epithelial Cells: 3 /HPF

## 2024-02-27 NOTE — PROVIDER CONTACT NOTE (OTHER) - SITUATION
Patient previously positive for the flu on 2/23. Immunocompetent patient, no need for further isolation at this time.

## 2024-02-27 NOTE — H&P ADULT - HISTORY OF PRESENT ILLNESS
Date of Service, 02-27-24 @ 06:27  CHIEF COMPLAINT:Patient is a 89y old  Female who presents with a chief complaint of     HPI:  89-year-old female, history of hypertension, hyperlipidemia, dementia, presented to the emergency department today for an episode of unresponsiveness to sternal rub.  Per EMS, when they the patient, patient was responsive when her name was called.  Patient currently stating that she feels well and is just tired.  Reports a cough but denies any other symptoms including fever, chest pain, shortness of breath, abdominal pain, urinary symptoms.  Of note, patient was seen in the emergency department here at [facility] for 3 days ago, was found to be positive for the flu.    PAST MEDICAL & SURGICAL HISTORY:  Hypertension  Mild cognitive impairment  H/O thyroid disease  Dementia  Hard of hearing      MEDICATIONS  (STANDING):  ciprofloxacin 500 mg/5 mL oral liquid: 5 milliliter(s) orally twice a day before breakfast and dinner  · 	Cipro 250 mg oral tablet: 1 tab(s) orally 2 times a day  · 	Physical Therapy:   · 	mupirocin 2% topical ointment: 1 application topically 2 times a day  · 	metoprolol succinate 25 mg oral tablet, extended release: 1 tab(s) orally once a day  · 	losartan 25 mg oral tablet: 1 tab(s) orally once a day  · 	melatonin 3 mg oral tablet: 1 tab(s) orally once a day (at bedtime), As needed, Insomnia  · 	haloperidol 0.5 mg oral tablet: 1 tab(s) orally once a day (at bedtime)  · 	AFO  brace for the Right foot drop:   · 	acetaminophen 500 mg oral tablet: 2 tab(s) orally 3 times a day, As Needed  · 	folic acid 1 mg oral tablet: 1 tab(s) orally once a day  · 	levothyroxine 50 mcg (0.05 mg) oral tablet: 1 tab(s) orally once a day  · 	Vitamin B1 100 mg oral tablet: 1 tab(s) orally once a day  · 	Daily Melodie oral tablet: 1 tab(s) orally once a day  · 	travoprost 0.004% ophthalmic solution: 1 drop(s) to each affected eye once a day (in the evening)    MEDICATIONS  (PRN):      FAMILY HISTORY:      SOCIAL HISTORY:    [x ] Non-smoker  [ ] Smoker  [ ] Alcohol    Allergies    No Known Allergies    Intolerances    	    REVIEW OF SYSTEMS:  CONSTITUTIONAL: No fever, weight loss, or fatigue  EYES: No eye pain, visual disturbances, or discharge  ENT:  No difficulty hearing, tinnitus, vertigo; No sinus or throat pain  NECK: No pain or stiffness  RESPIRATORY: No cough, wheezing, chills or hemoptysis; + Shortness of Breath  CARDIOVASCULAR: No chest pain, palpitations, passing out, dizziness, or leg swelling  GASTROINTESTINAL: No abdominal or epigastric pain. No nausea, vomiting, or hematemesis; No diarrhea or constipation. No melena or hematochezia.  GENITOURINARY: No dysuria, frequency, hematuria, or incontinence  NEUROLOGICAL: No headaches, memory loss, loss of strength, numbness, or tremors  SKIN: No itching, burning, rashes, or lesions   LYMPH Nodes: No enlarged glands  ENDOCRINE: No heat or cold intolerance; No hair loss  MUSCULOSKELETAL: No joint pain or swelling; No muscle, back, or extremity pain  PSYCHIATRIC: No depression, anxiety, mood swings, or difficulty sleeping  HEME/LYMPH: No easy bruising, or bleeding gums  ALLERGY AND IMMUNOLOGIC: No hives or eczema	    [ ] All others negative	  [ X] Unable to obtain    PHYSICAL EXAM:  T(C): 36.3 (02-27-24 @ 04:45), Max: 36.6 (02-26-24 @ 21:34)  HR: 67 (02-27-24 @ 04:45) (61 - 67)  BP: 133/90 (02-27-24 @ 04:45) (118/74 - 133/90)  RR: 18 (02-27-24 @ 04:45) (17 - 20)  SpO2: 94% (02-27-24 @ 04:45) (94% - 98%)  Wt(kg): --  I&O's Summary      Appearance: Normal	  HEENT:   Normal oral mucosa, PERRL, EOMI	  Lymphatic: No lymphadenopathy  Cardiovascular: Normal S1 S2, No JVD, + murmurs, No edema  Respiratory: rhonchi  Psychiatry:dementia  Gastrointestinal:  Soft, Non-tender, + BS	  Skin: No rashes, No ecchymoses, No cyanosis	  Neurologic: Non-focal  Extremities: Normal range of motion, No clubbing, cyanosis or edema  Vascular: + pvd    TELEMETRY: 	    ECG:  	  RADIOLOGY:  OTHER: 	  	  LABS:	 	    CARDIAC MARKERS:                              11.3   4.94  )-----------( 179      ( 26 Feb 2024 18:44 )             33.8     02-26    135  |  99  |  33<H>  ----------------------------<  92  4.3   |  23  |  0.95    Ca    9.0      26 Feb 2024 18:44    TPro  7.2  /  Alb  3.6  /  TBili  0.3  /  DBili  x   /  AST  38  /  ALT  20  /  AlkPhos  70  02-26    proBNP:   Lipid Profile:   HgA1c:   TSH:       PREVIOUS DIAGNOSTIC TESTING:      < from: 12 Lead ECG (02.23.24 @ 18:41) >  Diagnosis Line Atrial-sensed ventricular-paced rhythm  ABNORMAL ECG  WHEN COMPARED WITH ECG OF 20-OCT-2023 14:00,  NO  SIGNIFICANT CHANGES HAVE OCCURRED    < from: Xray Chest 1 View- PORTABLE-Urgent (02.26.24 @ 20:00) >  Left chest wall pacemaker.  The heart is normal in size.  The lungs are clear.  There is no pneumothorax or pleural effusion.    IMPRESSION:  Clear lungs.

## 2024-02-27 NOTE — PROVIDER CONTACT NOTE (OTHER) - ACTION/TREATMENT ORDERED:
pt was + for Flu on 2/23/24.  No longer febrile.  No isolation required, discontinued as per guideline.

## 2024-02-27 NOTE — PATIENT PROFILE ADULT - ABILITY TO HEAR (WITH HEARING AID OR HEARING APPLIANCE IF NORMALLY USED):
20
Mildly to Moderately Impaired: difficulty hearing in some environments or speaker may need to increase volume or speak distinctly

## 2024-02-27 NOTE — PATIENT PROFILE ADULT - FALL HARM RISK - HARM RISK INTERVENTIONS
Communicate Risk of Fall with Harm to all staff/Reinforce activity limits and safety measures with patient and family/Tailored Fall Risk Interventions/Visual Cue: Yellow wristband and red socks/Bed in lowest position, wheels locked, appropriate side rails in place/Call bell, personal items and telephone in reach/Instruct patient to call for assistance before getting out of bed or chair/Non-slip footwear when patient is out of bed/[place] to call system/Physically safe environment - no spills, clutter or unnecessary equipment/Purposeful Proactive Rounding/Room/bathroom lighting operational, light cord in reach Assistance with ambulation/Assistance OOB with selected safe patient handling equipment/Communicate Risk of Fall with Harm to all staff/Reinforce activity limits and safety measures with patient and family/Tailored Fall Risk Interventions/Visual Cue: Yellow wristband and red socks/Bed in lowest position, wheels locked, appropriate side rails in place/Call bell, personal items and telephone in reach/Instruct patient to call for assistance before getting out of bed or chair/Non-slip footwear when patient is out of bed/[place] to call system/Physically safe environment - no spills, clutter or unnecessary equipment/Purposeful Proactive Rounding/Room/bathroom lighting operational, light cord in reach

## 2024-02-29 NOTE — PROCEDURE NOTE - NSPROCSEDATIONNOT_GEN_ALL_CORE
Continue amlodipine daily. Decrease salt in the diet. Goal is to keep the blood pressure around 120/70 mmHg.   No

## 2024-02-29 NOTE — DISCHARGE NOTE PROVIDER - HOSPITAL COURSE
HPI:  Date of Service, 02-27-24 @ 06:27  CHIEF COMPLAINT:Patient is a 89y old  Female who presents with a chief complaint of     HPI:  89-year-old female, history of hypertension, hyperlipidemia, dementia, presented to the emergency department today for an episode of unresponsiveness to sternal rub.  Per EMS, when they the patient, patient was responsive when her name was called.  Patient currently stating that she feels well and is just tired.  Reports a cough but denies any other symptoms including fever, chest pain, shortness of breath, abdominal pain, urinary symptoms.  Of note, patient was seen in the emergency department here at [place] for 3 days ago, was found to be positive for the flu.         HPI:  Date of Service, 02-27-24 @ 06:27  CHIEF COMPLAINT:Patient is a 89y old  Female who presents with a chief complaint of     HPI:  89-year-old female, history of hypertension, hyperlipidemia, dementia, presented to the emergency department today for an episode of unresponsiveness to sternal rub.  Per EMS, when they the patient, patient was responsive when her name was called.  Patient currently stating that she feels well and is just tired.  Reports a cough but denies any other symptoms including fever, chest pain, shortness of breath, abdominal pain, urinary symptoms.  Of note, patient was seen in the emergency department here at [place] for 3 days ago, was found to be positive for the flu.    Hospital Course:89-year-old female, history of hypertension, hyperlipidemia, dementia, presented to the emergency department for an episode of unresponsiveness to sternal rub.    Now back at baseline. Sepsis work-up negative. PPM with out events   PROCEDURE:   · Procedure Name	PPM Interrogation Note  · Procedure Date/Time	29-Feb-2024 11:59  · Procedure Performed By	Myself  · Procedural Sedation Used	No  · 	[place]  · Model	Assurity 2272  · Mode	DDD  · Rate	50 ppm  · Atrial Lead	Yes  · P-wave amplitude (mV)	3  · Impedance (ohms)	490  · Threshold (V)	1  · Threshold at (ms)	0.4  · Right Ventricular Lead	Yes  · R-wave amplitude (mV)	8.7  · RV lead Impedance (ohms)	560  · Threshold (V)	1  · Threshold at (ms)	0.4  · Battery	Good  · Voltage	3.01  · Underlying Rhythm	Sinus Bradycardia  · Comments	PPM Interrogation for Syncope  · Additional Procedure Details	1. Battery longevity 8.3- 8.6 years  2. Lead impedances WNL  3. Sensing and pacing thresholds WNL  4. Apaced <1%, Vpaced 99%; underlying SB 40s  5. One mode switch on 1/4/24 for Atach lasting 8 seconds  6. No recent arrhythmia episodes to correlate with syncope  7. Normal pacemaker function      Important Medication Changes and Reason:    Active or Pending Issues Requiring Follow-up:  Follow-up with your Primary Care Doctor and Cardiologist     Advanced Directives:   [X ] Full code  [ ] DNR  [ ] Hospice    Discharge Diagnoses:  Syncope  Influenza A

## 2024-02-29 NOTE — DISCHARGE NOTE PROVIDER - CARE PROVIDER_API CALL
Bob Wilkinson  Internal Medicine  [address], [address]  [place] [address]  Phone: [phone]  Fax: [phone]  Follow Up Time: 1 week    Edmond Oliveira  Cardiovascular Disease  [address]  [place] [address]  Phone: [phone]  Fax: [phone]  Follow Up Time:

## 2024-02-29 NOTE — DIETITIAN INITIAL EVALUATION ADULT - PERTINENT MEDS FT
MEDICATIONS  (STANDING):  folic acid 1 milliGRAM(s) Oral daily  haloperidol     Tablet 0.5 milliGRAM(s) Oral at bedtime  heparin   Injectable 5000 Unit(s) SubCutaneous every 12 hours  latanoprost 0.005% Ophthalmic Solution 1 Drop(s) Both EYES at bedtime  levothyroxine 50 MICROGram(s) Oral daily  losartan 25 milliGRAM(s) Oral daily  metoprolol succinate ER 25 milliGRAM(s) Oral daily  multivitamin 1 Tablet(s) Oral daily  oseltamivir 30 milliGRAM(s) Oral daily    MEDICATIONS  (PRN):  acetaminophen     Tablet .. 650 milliGRAM(s) Oral every 6 hours PRN Temp greater or equal to 38C (100.4F), Moderate Pain (4 - 6)  melatonin 3 milliGRAM(s) Oral at bedtime PRN Insomnia

## 2024-02-29 NOTE — DIETITIAN INITIAL EVALUATION ADULT - PERTINENT LABORATORY DATA
02-28    138  |  101  |  25<H>  ----------------------------<  72  3.5   |  22  |  0.77    Ca    8.8      28 Feb 2024 06:51    TPro  6.7  /  Alb  3.3  /  TBili  0.3  /  DBili  x   /  AST  27  /  ALT  14  /  AlkPhos  61  02-28

## 2024-02-29 NOTE — PROCEDURE NOTE - ADDITIONAL PROCEDURE DETAILS
1. Battery longevity 8.3- 8.6 years  2. Lead impedances WNL  3. Sensing and pacing thresholds WNL  4. Apaced <1%, Vpaced 99%; underlying SB 40s  5. One mode switch on 1/4/24 for Atach lasting 8 seconds  6. No recent arrhythmia episodes to correlate with syncope  7. Normal pacemaker function    #[id number]

## 2024-02-29 NOTE — DIETITIAN INITIAL EVALUATION ADULT - NSFNSNUTRHOMESUPPLEMENTFT_GEN_A_CORE
Kaitlyn is a 46 year old  referred here by self for consultation regarding spotting for several days. This ocures about every 1-2 2 years since endometrial ablation 7 years ago..  Has been on Tamoxifen for Atypical Breat hyperplasia for 4 years.  She denies any pelvic pain.  Last pelvic ultrasound about a year ago  is as bellow.  EXAMINATION: US PELVIC COMPLETE WITH TRANSVAGINAL, 2019 1:09 PM      COMPARISON: CT is 2019, 2014, pelvic ultrasound 2018  history of endometrial ablation personal history of atypical ductal  hyperplasia of the breast.     HISTORY: Generalized pelvic pain.     TECHNIQUE: The pelvis was scanned in standard fashion with  transabdominal and transvaginal transducer(s) using both grey scale  and color Doppler techniques.     FINDINGS  The uterus measures 9.6 x 3.6 x 5.6 cm with heterogeneous echotexture.   There is no evidence of a focal fibroid. Both cysts are visualized.  There is marked periuterine vessels.  The endometrium is ill-defined and measures 2 mm stable cystic change.  The right ovary measures 2.8 x 2.2 x 2.2 cm and the left ovary  measures 2.2 x 0.8 x 1.6 cm.  There is blood flow to both ovaries.  There is no free fluid in the pelvis. No abnormality seen in the  adnexa.                                                                      IMPRESSION:   1.  Stable ultrasound findings. No focal finding to explain the  patient's pain.     AN MD JOVANA    ROS: Ten point review of systems was reviewed and negative except the above.    Gyne: - abn pap (last pap ), - STD's    Past Medical History:   Diagnosis Date     Anemia      Atypical ductal hyperplasia of breast      Choroidal nevus of right eye      Concussion 10/2015     Depressive disorder 2006    Treated with celexa for two years     Family history of breast cancer 2/3/2015     Family history of breast cancer 2/3/2015     Family history of breast cancer 2/3/2015     Family history of breast cancer 2/3/2015      Gastroesophageal reflux disease      Glaucoma suspect of both eyes      Papilloma of breast 5/23/12    Left, See Dr. Lynch at      PONV (postoperative nausea and vomiting)      S/P endometrial ablation 10/10/13    Menorrhagia     Shingles     x2 in the past     WBC decreased 2/3/2015     WBC decreased 2/3/2015     WBC decreased 2/3/2015     Past Surgical History:   Procedure Laterality Date     ABDOMEN SURGERY      rectoplasty     BIOPSY BREAST NEEDLE LOCALIZATION  7/30/2012    Procedure: BIOPSY BREAST NEEDLE LOCALIZATION;  left breast excisional Biopsy with wire localization @0830;  Surgeon: Robert Lynch MD;  Location: [facility] OR     BREAST SURGERY       COSMETIC SURGERY      Rectalplasty for episiotomy repair     COSMETIC SURGERY      Rectalplasty for episiotomy repair     DILATION AND CURETTAGE, HYSTEROSCOPY, ABLATE ENDOMETRIUM NOVASURE, COMBINED  10/10/2013    Procedure: COMBINED DILATION AND CURETTAGE, HYSTEROSCOPY, ABLATE ENDOMETRIUM NOVASURE;  Endometrial ablation with Novasure;  Surgeon: Indu Birmingham DO;  Location: [facility] OR     HC TOOTH EXTRACTION W/FORCEP       LUMPECTOMY BREAST Left 2/1/2017    Procedure: LUMPECTOMY BREAST;  Surgeon: Lori Montenegro MD;  Location: [facility]     SOFT TISSUE SURGERY      lumpectomy x 2 right breast     SOFT TISSUE SURGERY      episiotomy fixed     Patient Active Problem List   Diagnosis     CARDIOVASCULAR SCREENING; LDL GOAL LESS THAN 160     Iron deficiency anemia     Mammographic microcalcification found on diagnostic imaging of breast     Intraductal papilloma of breast     Atypical hyperplasia of breast     Acne     Bloating symptom     Abdominal pain     Abnormal biliary HIDA scan     Family history of breast cancer     Neutropenia (H)     Choroidal nevus of right eye     Situational mixed anxiety and depressive disorder     Glaucoma suspect, bilateral     Post-concussion syndrome     Muscle spasms of head or neck     Allergic rhinitis     WBC  decreased       ALL/Meds: Her medication and allergy histories were reviewed and are documented in their appropriate chart areas.    SH: - tob, - EtOH,     FH: Her family history was reviewed and documented in its appropriate chart area.    PE: /86   Pulse 79   Wt 66.4 kg (146 lb 4.8 oz)   Breastfeeding No   BMI 23.09 kg/m    Body mass index is 23.09 kg/m .    General Appearance:  healthy, alert, active, no distress  HEENT: NCAT    A/P    ICD-10-CM    1. Spotting  N92.0 US Pelvic Complete with Transvaginal       WE discussed the pattern of bleeding post ablation and the effect of tamoxifen on the uterine lining and risk of endometrial cancer.  We discussed the option of endometrial biopsy now vs repeating pelvic ultrasound to revaluate endometrial thickness.   She prefers repeat ultrasound at this time.    25 minutes was spent face to face with the patient today discussing her history, diagnosis, and follow-up plan as noted above.  Over 50% of the visit was spent in counseling and coordination of care.    Total Visit Time: 30 minutes.    CEPHAS AGBEH, MD.    Thiamine, multivitamin, and folic acid per H&P

## 2024-02-29 NOTE — DISCHARGE NOTE PROVIDER - NSDCMRMEDTOKEN_GEN_ALL_CORE_FT
acetaminophen 500 mg oral tablet: 2 tab(s) orally 3 times a day, As Needed  busPIRone 15 mg oral tablet: 1 tab(s) orally 2 times a day  Daily Melodie oral tablet: 1 tab(s) orally once a day  famotidine 20 mg oral tablet: 1 tab(s) orally once a day  folic acid 1 mg oral tablet: 1 tab(s) orally once a day  haloperidol 0.5 mg oral tablet: 1 tab(s) orally once a day (at bedtime)  levothyroxine 50 mcg (0.05 mg) oral tablet: 1 tab(s) orally once a day  losartan 25 mg oral tablet: 1 tab(s) orally once a day  melatonin 3 mg oral tablet: 1 tab(s) orally once a day (at bedtime), As needed, Insomnia  metoprolol succinate 25 mg oral tablet, extended release: 1 tab(s) orally once a day  mirtazapine 7.5 mg oral tablet: 1 tab(s) orally once a day (at bedtime)  Probiotic Formula (Bacillus Coagulans) oral capsule: 1 cap(s) orally once a day  travoprost 0.004% ophthalmic solution: 1 drop(s) to each affected eye once a day (in the evening)   acetaminophen 325 mg oral tablet: 2 tab(s) orally every 6 hours as needed for Temp greater or equal to 38C (100.4F), Moderate Pain (4 - 6)  Daily Melodie oral tablet: 1 tab(s) orally once a day  famotidine 20 mg oral tablet: 1 tab(s) orally once a day  folic acid 1 mg oral tablet: 1 tab(s) orally once a day  haloperidol 0.5 mg oral tablet: 1 tab(s) orally once a day (at bedtime)  levothyroxine 50 mcg (0.05 mg) oral tablet: 1 tab(s) orally once a day  losartan 25 mg oral tablet: 1 tab(s) orally once a day  melatonin 3 mg oral tablet: 1 tab(s) orally once a day (at bedtime) as needed for Insomnia  metoprolol succinate 25 mg oral tablet, extended release: 1 tab(s) orally once a day  oseltamivir 30 mg oral capsule: 1 cap(s) orally once a day until 3/3/24  Probiotic Formula (Bacillus Coagulans) oral capsule: 1 cap(s) orally once a day  travoprost 0.004% ophthalmic solution: 1 drop(s) to each affected eye once a day (in the evening)

## 2024-02-29 NOTE — DISCHARGE NOTE PROVIDER - NSDCFUADDAPPT_GEN_ALL_CORE_FT
APPTS ARE READY TO BE MADE: [X ] YES    Best Family or Patient Contact (if needed):    Additional Information about above appointments (if needed):    1: Dr. Wilkinson   2:   3:     Other comments or requests:    APPTS ARE READY TO BE MADE: [X ] YES    Best Family or Patient Contact (if needed):    Additional Information about above appointments (if needed):    1: Dr. Wilkinson   2:   3:     Other comments or requests:   Patient was outreached, however they advised they were readmitted to the hospital.

## 2024-02-29 NOTE — PHYSICAL THERAPY INITIAL EVALUATION ADULT - PERTINENT HX OF CURRENT PROBLEM, REHAB EVAL
89-year-old female, history of hypertension, hyperlipidemia, dementia, presented to the emergency department today for an episode of unresponsiveness to sternal rub.  Per EMS, when they the patient, patient was responsive when her name was called.  Patient currently stating that she feels well and is just tired.  Reports a cough but denies any other symptoms including fever, chest pain, shortness of breath, abdominal pain, urinary symptoms.  Of note, patient was seen in the emergency department here at [place] for 3 days ago, was found to be positive for the flu. 89-year-old female, history of hypertension, hyperlipidemia, dementia, presented to the emergency department today for an episode of unresponsiveness to sternal rub.  Per EMS, when they the patient, patient was responsive when her name was called.  Patient currently stating that she feels well and is just tired.  Reports a cough but denies any other symptoms including fever, chest pain, shortness of breath, abdominal pain, urinary symptoms.  Of note, patient was seen in the emergency department here at [place] for 3 days ago, was found to be positive for the flu. Hosp course: XR chest (2/26) Clear lungs.

## 2024-02-29 NOTE — DIETITIAN INITIAL EVALUATION ADULT - NSFNSGIASSESSMENTFT_GEN_A_CORE
Pt denies N/V. No diarrhea/constipation noted in chart. Last BM 2/28 per chart. Not currently ordered for a bowel regimen.

## 2024-02-29 NOTE — DIETITIAN INITIAL EVALUATION ADULT - NSFNSGIIOFT_GEN_A_CORE
I&O's Detail    28 Feb 2024 07:01  -  29 Feb 2024 07:00  --------------------------------------------------------  IN:    Oral Fluid: 430 mL  Total IN: 430 mL    OUT:  Total OUT: 0 mL    Total NET: 430 mL

## 2024-02-29 NOTE — DIETITIAN INITIAL EVALUATION ADULT - ENERGY INTAKE
Poor (<50%) Pt reports she is eating "okay." Per flowsheets, pt with <50% intake x2 meals. RD observed <50% breakfast eaten this AM.

## 2024-02-29 NOTE — DIETITIAN INITIAL EVALUATION ADULT - PHYSCIAL ASSESSMENT
Unable to obtain subjective wt hx per pt. Dosing wt 90 pounds (stated).    RD obtained bedscale wt 79 pounds, ?accuracy.  Wt hx per [facility] HIE in pounds: 90 (2/23, 10/20/23)    Potential 11 pound, 12% wt loss noted. RD to continue to monitor wt as able.    IBW: 96 pounds

## 2024-02-29 NOTE — DISCHARGE NOTE PROVIDER - NSDCCPCAREPLAN_GEN_ALL_CORE_FT
PRINCIPAL DISCHARGE DIAGNOSIS  Diagnosis: Influenza A  Assessment and Plan of Treatment: Supportive care  Take tamiflu as directed  Follow-up with your Primary Care Doctor      SECONDARY DISCHARGE DIAGNOSES  Diagnosis: Syncope  Assessment and Plan of Treatment: HOME CARE INSTRUCTIONS  Have someone stay with you until you feel stable.  Do not drive, operate machinery, or play sports until your caregiver says it is okay.  Keep all follow-up appointments as directed by your caregiver.   Lie down right away if you start feeling like you might faint. Breathe deeply and steadily. Wait until all the symptoms have passed.Drink enough fluids to keep your urine clear or pale yellow.  If you are taking blood pressure or heart medicine, get up slowly, taking several minutes to sit and then stand. This can reduce dizziness.  SEEK IMMEDIATE MEDICAL CARE IF:  You have a severe headache.  You have unusual pain in the chest, abdomen, or back.  You are bleeding from the mouth or rectum, or you have black or tarry stool.  You have an irregular or very fast heartbeat.  You have pain with breathing.  You have repeated fainting or seizure-like jerking during an episode.  You faint when sitting or lying down.  You have confusion.  You have difficulty walking.  You have severe weakness.  You have vision problems.  If you fainted, call your local emergency services (_____________________). Do not drive yourself to the hospital

## 2024-02-29 NOTE — DIETITIAN INITIAL EVALUATION ADULT - OTHER INFO
- Recent admission to [facility] for influenza A+  - Ordered for folic acid, a multivitamin, and synthroid in-house

## 2024-02-29 NOTE — PHARMACOTHERAPY INTERVENTION NOTE - COMMENTS
Confirmed home medications with [facility] transfer documents, updated in Outpatient Medication Review.    Home Medications:  acetaminophen 500 mg oral tablet: 2 tab(s) orally 3 times a day, As Needed  Daily Melodie oral tablet: 1 tab(s) orally once a day  folic acid 1 mg oral tablet: 1 tab(s) orally once a day  haloperidol 0.5 mg oral tablet: 1 tab(s) orally once a day (at bedtime)  levothyroxine 50 mcg (0.05 mg) oral tablet: 1 tab(s) orally once a day  losartan 25 mg oral tablet: 1 tab(s) orally once a day  melatonin 3 mg oral tablet: 1 tab(s) orally once a day (at bedtime), As needed, Insomnia  metoprolol succinate 25 mg oral tablet, extended release: 1 tab(s) orally once a day  travoprost 0.004% ophthalmic solution: 1 drop(s) to each affected eye once a day (in the evening)  Vitamin B1 100 mg oral tablet: 1 tab(s) orally once a day    Added:  busPIRone 15 mg oral tablet: 1 tab(s) orally 2 times a day  famotidine 20 mg oral tablet: 1 tab(s) orally once a day  mirtazapine 7.5 mg oral tablet: 2 tab(s) orally once a day (at bedtime)  Probiotic Formula (Bacillus Coagulans) oral capsule: 1 cap(s) orally once a day    Removed:  Cipro 250 mg oral tablet: 1 tab(s) orally 2 times a day  ciprofloxacin 500 mg/5 mL oral liquid: 5 milliliter(s) orally twice a day before breakfast and dinner  mupirocin 2% topical ointment: 1 application topically 2 times a day    Chantal Bowers PharmD  Transitions of Care Pharmacist  Available on [employer] Teams  Spectra #[phone]

## 2024-02-29 NOTE — DIETITIAN INITIAL EVALUATION ADULT - REASON INDICATOR FOR ASSESSMENT
Consult for MST score of 2 or >  Sources: Medical Record, pt with dementia, unable to meaningfully participate in interview, asking RD where she is and why she is there  Chart reviewed, events noted.

## 2024-02-29 NOTE — PHYSICAL THERAPY INITIAL EVALUATION ADULT - TRANSFER TRAINING, PT EVAL
GOAL: Patient will perform sit to stand transfers with modA at rolling walker with proper hand placement in 2 weeks

## 2024-03-02 NOTE — PROGRESS NOTE ADULT - NUTRITIONAL ASSESSMENT
This patient has been assessed with a concern for Malnutrition and has been determined to have a diagnosis/diagnoses of Severe protein-calorie malnutrition and Underweight (BMI < 19).    This patient is being managed with:   Diet Minced and Moist-  Entered: Feb 27 2024  6:43AM  

## 2024-03-02 NOTE — DISCHARGE NOTE NURSING/CASE MANAGEMENT/SOCIAL WORK - NSDCVIVACCINE_GEN_ALL_CORE_FT
Tdap; 12-Mar-2022 16:16; Ann Miller (RN); [employer]; [id number] (Exp. Date: 09-Sep-2023); IntraMuscular; Deltoid Left.; 0.5 milliLiter(s); VIS (VIS Published: 09-May-2013, VIS Presented: 12-Mar-2022);

## 2024-03-02 NOTE — PROGRESS NOTE ADULT - ASSESSMENT
89-year-old female,       history of hypertension, hyperlipidemia, dementia,       presented to the emergency department today for an episode of unresponsiveness        Per EMS, when they the patient, patient was responsive when her name was called.      Patient currently stating that she feels well and is just tired.  Reports a cough but denies any other symptoms including fever, chest pain, shortness of breath, abdominal pain, urinary symptoms.       seen  in  emergency department here at [place] for 3 days ago, was found to be positive for the flu.       hx of dementia,     h/o  sss, s/p ppm , ?paf, PVD     now  with with syncope and unresponsiveness    continue psych meds sec to underlying dementi  influenza , on   tamiflu    on cozaar,  toprol, tamiflu, haldol     d/c  plans

## 2024-03-02 NOTE — PROGRESS NOTE ADULT - SUBJECTIVE AND OBJECTIVE BOX
Date of Service: 02-28-24 @ 07:53           CARDIOLOGY     PROGRESS  NOTE   ________________________________________________    CHIEF COMPLAINT:Patient is a 89y old  Female who presents with a chief complaint of syncope (27 Feb 2024 06:26)  doing better  	  REVIEW OF SYSTEMS:  CONSTITUTIONAL: No fever, weight loss, or fatigue  EYES: No eye pain, visual disturbances, or discharge  ENT:  No difficulty hearing, tinnitus, vertigo; No sinus or throat pain  NECK: No pain or stiffness  RESPIRATORY: No cough, wheezing, chills or hemoptysis; No Shortness of Breath  CARDIOVASCULAR: No chest pain, palpitations, passing out, dizziness, or leg swelling  GASTROINTESTINAL: No abdominal or epigastric pain. No nausea, vomiting, or hematemesis; No diarrhea or constipation. No melena or hematochezia.  GENITOURINARY: No dysuria, frequency, hematuria, or incontinence  NEUROLOGICAL: No headaches, memory loss, loss of strength, numbness, or tremors  SKIN: No itching, burning, rashes, or lesions   LYMPH Nodes: No enlarged glands  ENDOCRINE: No heat or cold intolerance; No hair loss  MUSCULOSKELETAL: No joint pain or swelling; No muscle, back, or extremity pain  PSYCHIATRIC: No depression, anxiety, mood swings, or difficulty sleeping  HEME/LYMPH: No easy bruising, or bleeding gums  ALLERGY AND IMMUNOLOGIC: No hives or eczema	    [ ] All others negative	  [ x] Unable to obtain    PHYSICAL EXAM:  T(C): 37.3 (02-28-24 @ 04:57), Max: 37.3 (02-28-24 @ 04:57)  HR: 73 (02-28-24 @ 04:57) (68 - 78)  BP: 136/65 (02-28-24 @ 04:57) (124/65 - 136/65)  RR: 18 (02-28-24 @ 04:57) (18 - 19)  SpO2: 92% (02-28-24 @ 04:57) (92% - 97%)  Wt(kg): --  I&O's Summary      Appearance: Normal	  HEENT:   Normal oral mucosa, PERRL, EOMI	  Lymphatic: No lymphadenopathy  Cardiovascular: Normal S1 S2, No JVD, No murmurs, No edema  Respiratory: Lungs clear to auscultation	  Psychiatry: dementia  Gastrointestinal:  Soft, Non-tender, + BS	  Skin: No rashes, No ecchymoses, No cyanosis	  Neurologic: Non-focal  Extremities: Normal range of motion, No clubbing, cyanosis or edema  Vascular: + pvd    MEDICATIONS  (STANDING):  folic acid 1 milliGRAM(s) Oral daily  haloperidol     Tablet 0.5 milliGRAM(s) Oral at bedtime  heparin   Injectable 5000 Unit(s) SubCutaneous every 12 hours  latanoprost 0.005% Ophthalmic Solution 1 Drop(s) Both EYES at bedtime  levothyroxine 50 MICROGram(s) Oral daily  losartan 25 milliGRAM(s) Oral daily  metoprolol succinate ER 25 milliGRAM(s) Oral daily  multivitamin 1 Tablet(s) Oral daily  oseltamivir 30 milliGRAM(s) Oral daily      TELEMETRY: 	    ECG:  	  RADIOLOGY:  OTHER: 	  	  LABS:	 	    CARDIAC MARKERS:                          10.7   6.75  )-----------( 205      ( 28 Feb 2024 06:50 )             32.3     02-28    138  |  101  |  25<H>  ----------------------------<  72  3.5   |  22  |  0.77    Ca    8.8      28 Feb 2024 06:51    TPro  6.7  /  Alb  3.3  /  TBili  0.3  /  DBili  x   /  AST  27  /  ALT  14  /  AlkPhos  61  02-28    proBNP:   Lipid Profile:   HgA1c:   TSH:     Culture - Urine (02.26.24 @ 18:44)    Specimen Source: Clean Catch Clean Catch (Midstream)   Culture Results:   No growth    Culture - Blood (10.20.23 @ 13:40)    Specimen Source: .Blood Blood-Peripheral   Culture Results:   No growth at 5 days    < from: 12 Lead ECG (02.26.24 @ 18:56) >  Diagnosis Line Atrial-sensed ventricular-paced rhythm  ABNORMAL ECG  WHEN COMPARED WITH ECG OF 23-FEB-2024 18:41,  SIGNIFICANT CHANGES HAVE OCCURRED      Assessment and plan  ---------------------------  89-year-old female, history of hypertension, hyperlipidemia, dementia, presented to the emergency department today for an episode of unresponsiveness to sternal rub.  Per EMS, when they the patient, patient was responsive when her name was called.  Patient currently stating that she feels well and is just tired.  Reports a cough but denies any other symptoms including fever, chest pain, shortness of breath, abdominal pain, urinary symptoms.  Of note, patient was seen in the emergency department here at [place] for 3 days ago, was found to be positive for the flu.  pt is well known to me with hx of dementia, sss, s/p ppm , ?paf, PVD who presented to er with syncope and unresponsiveness  tele  check  ppm  continue bp meds  r/o sepsis, cultures are all negative  dvt prophylaxis  continue psych meds sec to underlying dementia  pvd ?adding  asa daily  check tsh/ b12  influenza a continue tamiflu  check ppm if ok may consider dc planning    	        
      ---___---___---___---___---___---___ ---___---___---___---___---___---___---___---___---                  M E D I C A L   A T T E N D I N G   P R O G R E S S   N O T E  ---___---___---___---___---___---___ ---___---___---___---___---___---___---___---___---        ================================================    ++CHIEF COMPLAINT:   Patient is a 89y old  Female who presents with a chief complaint of syncope (01 Mar 2024 07:48)      Influenza with other respiratory manifestations, other influenza virus identified      ---___---___---___---___---___---  PAST MEDICAL / Surgical  HISTORY:  PAST MEDICAL & SURGICAL HISTORY:  Hypertension      Mild cognitive impairment      H/O thyroid disease      Dementia      Hard of hearing          ---___---___---___---___---___---  FAMILY HISTORY:   FAMILY HISTORY:        ---___---___---___---___---___---  ALLERGIES:   Allergies    No Known Allergies    Intolerances        ---___---___---___---___---___---  MEDICATIONS:  MEDICATIONS  (STANDING):  folic acid 1 milliGRAM(s) Oral daily  haloperidol     Tablet 0.5 milliGRAM(s) Oral at bedtime  heparin   Injectable 5000 Unit(s) SubCutaneous every 12 hours  latanoprost 0.005% Ophthalmic Solution 1 Drop(s) Both EYES at bedtime  levothyroxine 50 MICROGram(s) Oral daily  losartan 25 milliGRAM(s) Oral daily  metoprolol succinate ER 25 milliGRAM(s) Oral daily  multivitamin 1 Tablet(s) Oral daily  oseltamivir 30 milliGRAM(s) Oral daily    MEDICATIONS  (PRN):  acetaminophen     Tablet .. 650 milliGRAM(s) Oral every 6 hours PRN Temp greater or equal to 38C (100.4F), Moderate Pain (4 - 6)  melatonin 3 milliGRAM(s) Oral at bedtime PRN Insomnia      ---___---___---___---___---___---  REVIEW OF SYSTEM:  unable to obtain    ---___---___---___---___---___---  VITAL SIGNS:  89y , CAPILLARY BLOOD GLUCOSE        T(C): 36.3 (03-01-24 @ 11:52), Max: 36.7 (02-29-24 @ 20:11)  HR: 70 (03-01-24 @ 11:52) (66 - 70)  BP: 127/78 (03-01-24 @ 11:52) (127/78 - 132/71)  RR: 18 (03-01-24 @ 11:52) (18 - 18)  SpO2: 96% (03-01-24 @ 11:52) (93% - 96%)  ---___---___---___---___---___---  PHYSICAL EXAM:    GEN: A&O X 3 , NAD , comfortable  HEENT: NCAT, PERRL, MMM, hearing intact  Neck: supple , no JVD  CVS: S1S2 , regular , No M/R/G appreciated  PULM: CTA B/L,  no W/R/R appreciated  ABD.: soft. non tender, non distended,  bowel sounds present  Extrem: intact pulses , no edema   Derm: No rash , no ecchymoses  PSYCH : normal mood,  no delusion not anxious     ---___---___---___---___---___---            LAB AND IMAGING:                                                  [All pertinent / recent Imaging reviewed]         ---___---___---___---___---___---___ ---___---___---___---___---                         A S S E S S M E N T   A N D   P L A N :      HEALTH ISSUES - PROBLEM Dx:  influenza on oseltmavir   hypothyroid continue synthroid   htn continue meds   oob to chair   -GI/DVT Prophylaxis as ordered  cardiology workup up for syncope     start dc planning to [place]             ___________________________  Thank you,  Liborio Guerra  [phone]
      ---___---___---___---___---___---___ ---___---___---___---___---___---___---___---___---                  M E D I C A L   A T T E N D I N G   P R O G R E S S   N O T E  ---___---___---___---___---___---___ ---___---___---___---___---___---___---___---___---        ================================================    ++CHIEF COMPLAINT:   Patient is a 89y old  Female who presents with a chief complaint of syncope (28 Feb 2024 07:53)      Influenza with other respiratory manifestations, other influenza virus identified      ---___---___---___---___---___---  PAST MEDICAL / Surgical  HISTORY:  PAST MEDICAL & SURGICAL HISTORY:  Hypertension      Mild cognitive impairment      H/O thyroid disease      Dementia      Hard of hearing          ---___---___---___---___---___---  FAMILY HISTORY:   FAMILY HISTORY:        ---___---___---___---___---___---  ALLERGIES:   Allergies    No Known Allergies    Intolerances        ---___---___---___---___---___---  MEDICATIONS:  MEDICATIONS  (STANDING):  folic acid 1 milliGRAM(s) Oral daily  haloperidol     Tablet 0.5 milliGRAM(s) Oral at bedtime  heparin   Injectable 5000 Unit(s) SubCutaneous every 12 hours  latanoprost 0.005% Ophthalmic Solution 1 Drop(s) Both EYES at bedtime  levothyroxine 50 MICROGram(s) Oral daily  losartan 25 milliGRAM(s) Oral daily  metoprolol succinate ER 25 milliGRAM(s) Oral daily  multivitamin 1 Tablet(s) Oral daily  oseltamivir 30 milliGRAM(s) Oral daily    MEDICATIONS  (PRN):  acetaminophen     Tablet .. 650 milliGRAM(s) Oral every 6 hours PRN Temp greater or equal to 38C (100.4F), Moderate Pain (4 - 6)  melatonin 3 milliGRAM(s) Oral at bedtime PRN Insomnia      ---___---___---___---___---___---  REVIEW OF SYSTEM:    unable to obtain due to dementia   ---___---___---___---___---___---  VITAL SIGNS:  89y , CAPILLARY BLOOD GLUCOSE        T(C): 36.6 (02-28-24 @ 11:33), Max: 37.3 (02-28-24 @ 04:57)  HR: 76 (02-28-24 @ 11:33) (68 - 76)  BP: 123/73 (02-28-24 @ 11:33) (123/73 - 136/65)  RR: 18 (02-28-24 @ 11:33) (18 - 18)  SpO2: 92% (02-28-24 @ 11:33) (92% - 94%)  ---___---___---___---___---___---  PHYSICAL EXAM:    GEN: A&O X 1 , NAD , comfortable  HEENT: NCAT, PERRL, MMM, hearing intact  Neck: supple , no JVD  CVS: S1S2 , regular , No M/R/G appreciated  PULM: CTA B/L,  no W/R/R appreciated  ABD.: soft. non tender, non distended,  bowel sounds present  Extrem: intact pulses , no edema   Derm: No rash , no ecchymoses  PSYCH : normal mood,  no delusion not anxious     ---___---___---___---___---___---            LAB AND IMAGING:                          10.7   6.75  )-----------( 205      ( 28 Feb 2024 06:50 )             32.3               02-28    138  |  101  |  25<H>  ----------------------------<  72  3.5   |  22  |  0.77    Ca    8.8      28 Feb 2024 06:51    TPro  6.7  /  Alb  3.3  /  TBili  0.3  /  DBili  x   /  AST  27  /  ALT  14  /  AlkPhos  61  02-28                            Urinalysis Basic - ( 28 Feb 2024 06:51 )    Color: x / Appearance: x / SG: x / pH: x  Gluc: 72 mg/dL / Ketone: x  / Bili: x / Urobili: x   Blood: x / Protein: x / Nitrite: x   Leuk Esterase: x / RBC: x / WBC x   Sq Epi: x / Non Sq Epi: x / Bacteria: x        [All pertinent / recent Imaging reviewed]         ---___---___---___---___---___---___ ---___---___---___---___---                         A S S E S S M E N T   A N D   P L A N :      HEALTH ISSUES - PROBLEM Dx:  influenza on oseltmavir   hypothyroid continue synthroid   htn continue meds   oob to chair   -GI/DVT Prophylaxis.      ___________________________  Thank you,  Liborio Guerra  [phone]
Date of Service: 02-29-24 @ 08:03           CARDIOLOGY     PROGRESS  NOTE   ________________________________________________    CHIEF COMPLAINT:Patient is a 89y old  Female who presents with a chief complaint of syncope (28 Feb 2024 19:52)  no complain  	  REVIEW OF SYSTEMS:  CONSTITUTIONAL: No fever, weight loss, or fatigue  EYES: No eye pain, visual disturbances, or discharge  ENT:  No difficulty hearing, tinnitus, vertigo; No sinus or throat pain  NECK: No pain or stiffness  RESPIRATORY: No cough, wheezing, chills or hemoptysis; No Shortness of Breath  CARDIOVASCULAR: No chest pain, palpitations, passing out, dizziness, or leg swelling  GASTROINTESTINAL: No abdominal or epigastric pain. No nausea, vomiting, or hematemesis; No diarrhea or constipation. No melena or hematochezia.  GENITOURINARY: No dysuria, frequency, hematuria, or incontinence  NEUROLOGICAL: No headaches, memory loss, loss of strength, numbness, or tremors  SKIN: No itching, burning, rashes, or lesions   LYMPH Nodes: No enlarged glands  ENDOCRINE: No heat or cold intolerance; No hair loss  MUSCULOSKELETAL: No joint pain or swelling; No muscle, back, or extremity pain  PSYCHIATRIC: No depression, anxiety, mood swings, or difficulty sleeping  HEME/LYMPH: No easy bruising, or bleeding gums  ALLERGY AND IMMUNOLOGIC: No hives or eczema	    [x ] All others negative	  [ ] Unable to obtain    PHYSICAL EXAM:  T(C): 36.6 (02-29-24 @ 04:11), Max: 36.6 (02-28-24 @ 11:33)  HR: 67 (02-29-24 @ 04:11) (62 - 76)  BP: 131/69 (02-29-24 @ 04:11) (111/70 - 131/69)  RR: 18 (02-29-24 @ 04:11) (18 - 18)  SpO2: 94% (02-29-24 @ 04:11) (92% - 94%)  Wt(kg): --  I&O's Summary    28 Feb 2024 07:01  -  29 Feb 2024 07:00  --------------------------------------------------------  IN: 430 mL / OUT: 0 mL / NET: 430 mL        Appearance: Normal	  HEENT:   Normal oral mucosa, PERRL, EOMI	  Lymphatic: No lymphadenopathy  Cardiovascular: Normal S1 S2, No JVD,+ murmurs, No edema  Respiratory: rhonchi  Psychiatry: dementia  Gastrointestinal:  Soft, Non-tender, + BS	  Skin: No rashes, No ecchymoses, No cyanosis	  Extremities: Normal range of motion, No clubbing, cyanosis or edema  Vascular: Peripheral pulses palpable 2+ bilaterally    MEDICATIONS  (STANDING):  folic acid 1 milliGRAM(s) Oral daily  haloperidol     Tablet 0.5 milliGRAM(s) Oral at bedtime  heparin   Injectable 5000 Unit(s) SubCutaneous every 12 hours  latanoprost 0.005% Ophthalmic Solution 1 Drop(s) Both EYES at bedtime  levothyroxine 50 MICROGram(s) Oral daily  losartan 25 milliGRAM(s) Oral daily  metoprolol succinate ER 25 milliGRAM(s) Oral daily  multivitamin 1 Tablet(s) Oral daily  oseltamivir 30 milliGRAM(s) Oral daily      TELEMETRY: 	    ECG:  	  RADIOLOGY:  OTHER: 	  	  LABS:	 	    CARDIAC MARKERS:                                10.7   6.75  )-----------( 205      ( 28 Feb 2024 06:50 )             32.3     02-28    138  |  101  |  25<H>  ----------------------------<  72  3.5   |  22  |  0.77    Ca    8.8      28 Feb 2024 06:51    TPro  6.7  /  Alb  3.3  /  TBili  0.3  /  DBili  x   /  AST  27  /  ALT  14  /  AlkPhos  61  02-28    proBNP:   Lipid Profile:   HgA1c:   TSH: Thyroid Stimulating Hormone, Serum: 2.48 uIU/mL (02-28 @ 06:51)      < from: 12 Lead ECG (02.26.24 @ 18:56) >  Diagnosis Line Atrial-sensed ventricular-paced rhythm  ABNORMAL ECG  WHEN COMPARED WITH ECG OF 23-FEB-2024 18:41,  SIGNIFICANT CHANGES HAVE OCCURRED      called [facility] and  left essage with [facility] for [employer] information, callback pending. Niece   assigns herself as a caregiver. Unclear needs for discharge at present. Pt will  need  transportation bc to [facility]  upon medical clearance. CM will continue to  collaborate with interdisciplinary team and remain available to assist.    Culture - Urine (02.26.24 @ 18:44)    Specimen Source: Clean Catch Clean Catch (Midstream)   Culture Results:   No growth        Assessment and plan  ---------------------------  89-year-old female, history of hypertension, hyperlipidemia, dementia, presented to the emergency department today for an episode of unresponsiveness to sternal rub.  Per EMS, when they the patient, patient was responsive when her name was called.  Patient currently stating that she feels well and is just tired.  Reports a cough but denies any other symptoms including fever, chest pain, shortness of breath, abdominal pain, urinary symptoms.  Of note, patient was seen in the emergency department here at [place] for 3 days ago, was found to be positive for the flu.  pt is well known to me with hx of dementia, sss, s/p ppm , ?paf, PVD who presented to er with syncope and unresponsiveness  tele  check  ppm  continue bp meds  r/o sepsis, cultures are all negative  dvt prophylaxis  continue psych meds sec to underlying dementia  pvd ?adding  asa daily  check tsh/ b12  influenza a continue tamiflu  check ppm if ok may consider dc planning  need to check ppm awaiting ppm card  dc planning    	        
      ---___---___---___---___---___---___ ---___---___---___---___---___---___---___---___---                  M E D I C A L   A T T E N D I N G   P R O G R E S S   N O T E  ---___---___---___---___---___---___ ---___---___---___---___---___---___---___---___---        ================================================    ++CHIEF COMPLAINT:   Patient is a 89y old  Female who presents with a chief complaint of syncope (29 Feb 2024 13:34)      Influenza with other respiratory manifestations, other influenza virus identified      ---___---___---___---___---___---  PAST MEDICAL / Surgical  HISTORY:  PAST MEDICAL & SURGICAL HISTORY:  Hypertension      Mild cognitive impairment      H/O thyroid disease      Dementia      Hard of hearing          ---___---___---___---___---___---  FAMILY HISTORY:   FAMILY HISTORY:        ---___---___---___---___---___---  ALLERGIES:   Allergies    No Known Allergies    Intolerances        ---___---___---___---___---___---  MEDICATIONS:  MEDICATIONS  (STANDING):  folic acid 1 milliGRAM(s) Oral daily  haloperidol     Tablet 0.5 milliGRAM(s) Oral at bedtime  heparin   Injectable 5000 Unit(s) SubCutaneous every 12 hours  latanoprost 0.005% Ophthalmic Solution 1 Drop(s) Both EYES at bedtime  levothyroxine 50 MICROGram(s) Oral daily  losartan 25 milliGRAM(s) Oral daily  metoprolol succinate ER 25 milliGRAM(s) Oral daily  multivitamin 1 Tablet(s) Oral daily  oseltamivir 30 milliGRAM(s) Oral daily    MEDICATIONS  (PRN):  acetaminophen     Tablet .. 650 milliGRAM(s) Oral every 6 hours PRN Temp greater or equal to 38C (100.4F), Moderate Pain (4 - 6)  melatonin 3 milliGRAM(s) Oral at bedtime PRN Insomnia      ---___---___---___---___---___---  REVIEW OF SYSTEM:    unable to obtain  ---___---___---___---___---___---  VITAL SIGNS:  89y , CAPILLARY BLOOD GLUCOSE        T(C): 36.7 (02-29-24 @ 11:59), Max: 36.7 (02-29-24 @ 11:59)  HR: 63 (02-29-24 @ 11:59) (62 - 67)  BP: 131/67 (02-29-24 @ 11:59) (111/70 - 131/69)  RR: 18 (02-29-24 @ 11:59) (18 - 18)  SpO2: 91% (02-29-24 @ 11:59) (91% - 94%)  ---___---___---___---___---___---  PHYSICAL EXAM:    GEN: A&O X 0 , NAD , comfortable  HEENT: NCAT, PERRL, MMM, hearing intact  Neck: supple , no JVD  CVS: S1S2 , regular , No M/R/G appreciated  PULM: CTA B/L,  no W/R/R appreciated  ABD.: soft. non tender, non distended,  bowel sounds present  Extrem: intact pulses , no edema   Derm: No rash , no ecchymoses  PSYCH : normal mood,  no delusion not anxious     ---___---___---___---___---___---            LAB AND IMAGING:                          10.7   6.75  )-----------( 205      ( 28 Feb 2024 06:50 )             32.3               02-28    138  |  101  |  25<H>  ----------------------------<  72  3.5   |  22  |  0.77    Ca    8.8      28 Feb 2024 06:51    TPro  6.7  /  Alb  3.3  /  TBili  0.3  /  DBili  x   /  AST  27  /  ALT  14  /  AlkPhos  61  02-28                            Urinalysis Basic - ( 28 Feb 2024 06:51 )    Color: x / Appearance: x / SG: x / pH: x  Gluc: 72 mg/dL / Ketone: x  / Bili: x / Urobili: x   Blood: x / Protein: x / Nitrite: x   Leuk Esterase: x / RBC: x / WBC x   Sq Epi: x / Non Sq Epi: x / Bacteria: x        [All pertinent / recent Imaging reviewed]         ---___---___---___---___---___---___ ---___---___---___---___---                         A S S E S S M E N T   A N D   P L A N :      HEALTH ISSUES - PROBLEM Dx:  influenza on oseltmavir   hypothyroid continue synthroid   htn continue meds   oob to chair   -GI/DVT Prophylaxis as ordered  cardiology workup up for syncope     start dc planning to long term  ___________________________  Thank you,  Liborio Guerra  [phone]
Date of Service: 03-01-24 @ 07:48           CARDIOLOGY     PROGRESS  NOTE   ________________________________________________    CHIEF COMPLAINT:Patient is a 89y old  Female who presents with a chief complaint of syncope (29 Feb 2024 19:56)  doing well  	  REVIEW OF SYSTEMS:  CONSTITUTIONAL: No fever, weight loss, or fatigue  EYES: No eye pain, visual disturbances, or discharge  ENT:  No difficulty hearing, tinnitus, vertigo; No sinus or throat pain  NECK: No pain or stiffness  RESPIRATORY: No cough, wheezing, chills or hemoptysis; No Shortness of Breath  CARDIOVASCULAR: No chest pain, palpitations, passing out, dizziness, or leg swelling  GASTROINTESTINAL: No abdominal or epigastric pain. No nausea, vomiting, or hematemesis; No diarrhea or constipation. No melena or hematochezia.  GENITOURINARY: No dysuria, frequency, hematuria, or incontinence  NEUROLOGICAL: No headaches, memory loss, loss of strength, numbness, or tremors  SKIN: No itching, burning, rashes, or lesions   LYMPH Nodes: No enlarged glands  ENDOCRINE: No heat or cold intolerance; No hair loss  MUSCULOSKELETAL: No joint pain or swelling; No muscle, back, or extremity pain  PSYCHIATRIC: No depression, anxiety, mood swings, or difficulty sleeping  HEME/LYMPH: No easy bruising, or bleeding gums  ALLERGY AND IMMUNOLOGIC: No hives or eczema	    [ ] All others negative	  [ x] Unable to obtain    PHYSICAL EXAM:  T(C): 36.7 (03-01-24 @ 04:59), Max: 36.7 (02-29-24 @ 11:59)  HR: 67 (03-01-24 @ 04:59) (63 - 67)  BP: 131/81 (03-01-24 @ 04:59) (131/67 - 132/71)  RR: 18 (03-01-24 @ 04:59) (18 - 18)  SpO2: 96% (03-01-24 @ 04:59) (91% - 96%)  Wt(kg): --  I&O's Summary      Appearance: Normal	  HEENT:   Normal oral mucosa, PERRL, EOMI	  Lymphatic: No lymphadenopathy  Cardiovascular: Normal S1 S2, No JVD, + murmurs, No edema  Respiratory: Lungs clear to auscultation	  Psychiatry:dementia  Gastrointestinal:  Soft, Non-tender, + BS	  Skin: No rashes, No ecchymoses, No cyanosis	  Neurologic: Non-focal  Extremities: Normal range of motion, No clubbing, cyanosis or edema  Vascular: Peripheral pulses palpable 2+ bilaterally    MEDICATIONS  (STANDING):  folic acid 1 milliGRAM(s) Oral daily  haloperidol     Tablet 0.5 milliGRAM(s) Oral at bedtime  heparin   Injectable 5000 Unit(s) SubCutaneous every 12 hours  latanoprost 0.005% Ophthalmic Solution 1 Drop(s) Both EYES at bedtime  levothyroxine 50 MICROGram(s) Oral daily  losartan 25 milliGRAM(s) Oral daily  metoprolol succinate ER 25 milliGRAM(s) Oral daily  multivitamin 1 Tablet(s) Oral daily  oseltamivir 30 milliGRAM(s) Oral daily      TELEMETRY: 	    ECG:  	  RADIOLOGY:  OTHER: 	  	  LABS:	 	    CARDIAC MARKERS:      proBNP:   Lipid Profile:   HgA1c:   TSH: Thyroid Stimulating Hormone, Serum: 2.48 uIU/mL (02-28 @ 06:51)          Assessment and plan  ---------------------------  89-year-old female, history of hypertension, hyperlipidemia, dementia, presented to the emergency department today for an episode of unresponsiveness to sternal rub.  Per EMS, when they the patient, patient was responsive when her name was called.  Patient currently stating that she feels well and is just tired.  Reports a cough but denies any other symptoms including fever, chest pain, shortness of breath, abdominal pain, urinary symptoms.  Of note, patient was seen in the emergency department here at [place] for 3 days ago, was found to be positive for the flu.  pt is well known to me with hx of dementia, sss, s/p ppm , ?paf, PVD who presented to er with syncope and unresponsiveness  tele  check  ppm  continue bp meds  r/o sepsis, cultures are all negative  dvt prophylaxis  continue psych meds sec to underlying dementia  pvd ?adding  asa daily  check tsh/ b12  influenza a continue tamiflu  check ppm if ok may consider dc planning  need to check ppm awaiting ppm card/ tele ppm pacing  dc planning today    	        
Date of Service: 03-02-24 @ 08:33           CARDIOLOGY     PROGRESS  NOTE   ________________________________________________    CHIEF COMPLAINT:Patient is a 89y old  Female who presents with a chief complaint of syncope (01 Mar 2024 15:13)  no complain  	  REVIEW OF SYSTEMS:  CONSTITUTIONAL: No fever, weight loss, or fatigue  EYES: No eye pain, visual disturbances, or discharge  ENT:  No difficulty hearing, tinnitus, vertigo; No sinus or throat pain  NECK: No pain or stiffness  RESPIRATORY: No cough, wheezing, chills or hemoptysis; No Shortness of Breath  CARDIOVASCULAR: No chest pain, palpitations, passing out, dizziness, or leg swelling  GASTROINTESTINAL: No abdominal or epigastric pain. No nausea, vomiting, or hematemesis; No diarrhea or constipation. No melena or hematochezia.  GENITOURINARY: No dysuria, frequency, hematuria, or incontinence  NEUROLOGICAL: No headaches, memory loss, loss of strength, numbness, or tremors  SKIN: No itching, burning, rashes, or lesions   LYMPH Nodes: No enlarged glands  ENDOCRINE: No heat or cold intolerance; No hair loss  MUSCULOSKELETAL: No joint pain or swelling; No muscle, back, or extremity pain  PSYCHIATRIC: No depression, anxiety, mood swings, or difficulty sleeping  HEME/LYMPH: No easy bruising, or bleeding gums  ALLERGY AND IMMUNOLOGIC: No hives or eczema	    [x ] All others negative	  [ ] Unable to obtain    PHYSICAL EXAM:  T(C): 36.5 (03-02-24 @ 04:00), Max: 36.6 (03-01-24 @ 19:58)  HR: 88 (03-02-24 @ 04:00) (70 - 88)  BP: 164/81 (03-02-24 @ 04:00) (127/78 - 164/81)  RR: 18 (03-02-24 @ 04:00) (18 - 18)  SpO2: 98% (03-02-24 @ 04:00) (95% - 98%)  Wt(kg): --  I&O's Summary    01 Mar 2024 07:01  -  02 Mar 2024 07:00  --------------------------------------------------------  IN: 320 mL / OUT: 150 mL / NET: 170 mL        Appearance: Normal	  HEENT:   Normal oral mucosa, PERRL, EOMI	  Lymphatic: No lymphadenopathy  Cardiovascular: Normal S1 S2, No JVD, + murmurs, No edema  Respiratory: rhonchi  Psychiatry: A & O x 3, Mood & affect appropriate  Gastrointestinal:  Soft, Non-tender, + BS	  Skin: No rashes, No ecchymoses, No cyanosis	  Extremities: Normal range of motion, No clubbing, cyanosis or edema  Vascular: Peripheral pulses palpable 2+ bilaterally    MEDICATIONS  (STANDING):  folic acid 1 milliGRAM(s) Oral daily  haloperidol     Tablet 0.5 milliGRAM(s) Oral at bedtime  heparin   Injectable 5000 Unit(s) SubCutaneous every 12 hours  latanoprost 0.005% Ophthalmic Solution 1 Drop(s) Both EYES at bedtime  levothyroxine 50 MICROGram(s) Oral daily  losartan 25 milliGRAM(s) Oral daily  metoprolol succinate ER 25 milliGRAM(s) Oral daily  multivitamin 1 Tablet(s) Oral daily  oseltamivir 30 milliGRAM(s) Oral daily      TELEMETRY: 	    ECG:  	  RADIOLOGY:  OTHER: 	  	  LABS:	 	    CARDIAC MARKERS:    proBNP:   Lipid Profile:   HgA1c:   TSH: Thyroid Stimulating Hormone, Serum: 2.48 uIU/mL (02-28 @ 06:51)  Notes: Confirmed receipt of 3122 short form. Clinical was reviewed and accepted  for patient to return to the [facility] in AM by nurse Amanda.  Transportation will  be arranged by assigned  in AM.  Left a message to Clarymyron regarding  patient DC plan for tomorrow 3/2.      Assessment and plan  ---------------------------  89-year-old female, history of hypertension, hyperlipidemia, dementia, presented to the emergency department today for an episode of unresponsiveness to sternal rub.  Per EMS, when they the patient, patient was responsive when her name was called.  Patient currently stating that she feels well and is just tired.  Reports a cough but denies any other symptoms including fever, chest pain, shortness of breath, abdominal pain, urinary symptoms.  Of note, patient was seen in the emergency department here at [place] for 3 days ago, was found to be positive for the flu.  pt is well known to me with hx of dementia, sss, s/p ppm , ?paf, PVD who presented to er with syncope and unresponsiveness  tele  check  ppm  continue bp meds  r/o sepsis, cultures are all negative  dvt prophylaxis  continue psych meds sec to underlying dementia  pvd ?adding  asa daily  check tsh/ b12  influenza on  continue tamiflu  check ppm if ok may consider dc planning  need to check ppm awaiting ppm card/ tele ppm pacing  dc planning today, continue current meds    	        
  date of seafebrile  REVIEW OF SYSTEMS:  CONSTITUTIONAL: No fever,  no  weight loss  ENT:  No  tinnitus,   no   vertigo  NECK: No pain or stiffness  RESPIRATORY: No cough, wheezing, chills or hemoptysis;    No Shortness of Breath  CARDIOVASCULAR: No chest pain, palpitations, dizziness  GASTROINTESTINAL: No abdominal or epigastric pain. No nausea, vomiting, or hematemesis; No diarrhea  No melena or hematochezia.  GENITOURINARY: No dysuria, frequency, hematuria, or incontinence  NEUROLOGICAL: No headaches  SKIN: No itching,  no   rash  LYMPH Nodes: No enlarged glands  ENDOCRINE: No heat or cold intolerance  MUSCULOSKELETAL: No joint pain or swelling  PSYCHIATRIC: No depression, anxiety  HEME/LYMPH: No easy bruising, or bleeding gums  ALLERGY AND IMMUNOLOGIC: No hives or eczema	    MEDICATIONS  (STANDING):  folic acid 1 milliGRAM(s) Oral daily  haloperidol     Tablet 0.5 milliGRAM(s) Oral at bedtime  heparin   Injectable 5000 Unit(s) SubCutaneous every 12 hours  latanoprost 0.005% Ophthalmic Solution 1 Drop(s) Both EYES at bedtime  levothyroxine 50 MICROGram(s) Oral daily  losartan 25 milliGRAM(s) Oral daily  metoprolol succinate ER 25 milliGRAM(s) Oral daily  multivitamin 1 Tablet(s) Oral daily  oseltamivir 30 milliGRAM(s) Oral daily    MEDICATIONS  (PRN):  acetaminophen     Tablet .. 650 milliGRAM(s) Oral every 6 hours PRN Temp greater or equal to 38C (100.4F), Moderate Pain (4 - 6)  melatonin 3 milliGRAM(s) Oral at bedtime PRN Insomnia      Vital Signs Last 24 Hrs  T(C): 36.5 (02 Mar 2024 04:00), Max: 36.6 (01 Mar 2024 19:58)  T(F): 97.7 (02 Mar 2024 04:00), Max: 97.9 (01 Mar 2024 19:58)  HR: 88 (02 Mar 2024 04:00) (70 - 88)  BP: 164/81 (02 Mar 2024 04:00) (127/78 - 164/81)  BP(mean): --  RR: 18 (02 Mar 2024 04:00) (18 - 18)  SpO2: 98% (02 Mar 2024 04:00) (95% - 98%)    Parameters below as of 02 Mar 2024 04:00  Patient On (Oxygen Delivery Method): room air      CAPILLARY BLOOD GLUCOSE        I&O's Summary    01 Mar 2024 07:01  -  02 Mar 2024 07:00  --------------------------------------------------------  IN: 320 mL / OUT: 150 mL / NET: 170 mL    02 Mar 2024 07:01  -  02 Mar 2024 11:13  --------------------------------------------------------  IN: 240 mL / OUT: 0 mL / NET: 240 mL          Appearance: Normal	  HEENT:   Normal oral mucosa, PERRL, EOMI	  Lymphatic: No lymphadenopathy  Cardiovascular: Normal S1 S2, No JVD  Respiratory: Lungs clear to auscultation	  Gastrointestinal:  Soft, Non-tender, + BS	  Skin: No rash, No ecchymoses	  Extremities:     LABS:                          Thyroid Stimulating Hormone, Serum: 2.48 uIU/mL (02-28 @ 06:51)          Consultant(s) Notes Reviewed:      Care Discussed with Consultants/Other Providers:

## 2024-03-02 NOTE — DISCHARGE NOTE NURSING/CASE MANAGEMENT/SOCIAL WORK - PATIENT PORTAL LINK FT
You can access the FollowMyHealth Patient Portal offered by [facility] by registering at the following website: [email or web address]. By joining [employer]s FollowMyHealth portal, you will also be able to view your health information using other applications (apps) compatible with our system.

## 2024-03-02 NOTE — PROGRESS NOTE ADULT - PROVIDER SPECIALTY LIST ADULT
Cardiology
Family Medicine
Cardiology
Family Medicine
Family Medicine
Internal Medicine

## 2024-03-02 NOTE — DISCHARGE NOTE NURSING/CASE MANAGEMENT/SOCIAL WORK - NSDCFUADDAPPT_GEN_ALL_CORE_FT
APPTS ARE READY TO BE MADE: [X ] YES    Best Family or Patient Contact (if needed):    Additional Information about above appointments (if needed):    1: Dr. Wilkinson   2:   3:     Other comments or requests:

## 2024-03-08 NOTE — CONSULT NOTE ADULT - SUBJECTIVE AND OBJECTIVE BOX
Date of Service, 03-08-24 @ 18:49  CHIEF COMPLAINT:Patient is a 89y old  Female who presents with a chief complaint of ams/  ftt (08 Mar 2024 14:35)      HPI:  89 yo F w/ PMHx of malnutrition, HTN, pacemaker, hypothyroidism, and mild cognitive impairment presents for 1 day of mental status changes, decreased p.o. intake, trouble chewing/drinking, and chest congestion. After speaking with niece (Rebecca Chilel), patient was recently admitted to [facility] for mental status change during which time her pacemaker was interrogated and she had sepsis workup.  She recently was found to have the flu.  Patient was at baseline (3/2) - A&Ox3 and able to hold conversations.  HPI limited due to limited paperwork and patient's altered mental status. She presents from [facility] limited paperwork.      PAST MEDICAL & SURGICAL HISTORY:  Hypertension  Mild cognitive impairment  H/O thyroid disease  Dementia  Hard of hearing    MEDICATIONS  (STANDING):  cefTRIAXone   IVPB 1000 milliGRAM(s) IV Intermittent every 24 hours  dextrose 5% + sodium chloride 0.9%. 1000 milliLiter(s) (60 mL/Hr) IV Continuous <Continuous>  heparin   Injectable 5000 Unit(s) SubCutaneous every 12 hours  latanoprost 0.005% Ophthalmic Solution 1 Drop(s) Both EYES at bedtime  levothyroxine Injectable 25 MICROGram(s) IV Push at bedtime    MEDICATIONS  (PRN):      FAMILY HISTORY:      SOCIAL HISTORY:    [x ] Non-smoker  [ ] Smoker  [ ] Alcohol    Allergies    No Known Allergies    Intolerances    	    REVIEW OF SYSTEMS:  CONSTITUTIONAL: No fever, weight loss, or fatigue  EYES: No eye pain, visual disturbances, or discharge  ENT:  No difficulty hearing, tinnitus, vertigo; No sinus or throat pain  NECK: No pain or stiffness  RESPIRATORY: No cough, wheezing, chills or hemoptysis; No Shortness of Breath  CARDIOVASCULAR: No chest pain, palpitations, passing out, dizziness, or leg swelling  GASTROINTESTINAL: No abdominal or epigastric pain. No nausea, vomiting, or hematemesis; No diarrhea or constipation. No melena or hematochezia.  GENITOURINARY: No dysuria, frequency, hematuria, or incontinence  NEUROLOGICAL: No headaches, memory loss, loss of strength, numbness, or tremors  SKIN: No itching, burning, rashes, or lesions   LYMPH Nodes: No enlarged glands  ENDOCRINE: No heat or cold intolerance; No hair loss  MUSCULOSKELETAL: No joint pain or swelling; No muscle, back, or extremity pain  PSYCHIATRIC: No depression, anxiety, mood swings, or difficulty sleeping  HEME/LYMPH: No easy bruising, or bleeding gums  ALLERGY AND IMMUNOLOGIC: No hives or eczema	    [ ] All others negative	  [ x] Unable to obtain    PHYSICAL EXAM:  T(C): 36.2 (03-08-24 @ 18:24), Max: 36.6 (03-08-24 @ 11:22)  HR: 96 (03-08-24 @ 18:24) (71 - 96)  BP: 111/67 (03-08-24 @ 18:24) (101/68 - 113/77)  RR: 20 (03-08-24 @ 18:24) (18 - 22)  SpO2: 96% (03-08-24 @ 18:24) (95% - 96%)  Wt(kg): --  I&O's Summary      Appearance: Normal	  HEENT:   Normal oral mucosa, PERRL, EOMI	  Lymphatic: No lymphadenopathy  Cardiovascular: Normal S1 S2, No JVD, + murmurs, No edema  Respiratory: Lungs clear to auscultation	  Psychiatry: dementia  Gastrointestinal:  Soft, Non-tender, + BS	  Skin: No rashes, No ecchymoses, No cyanosis	  Neurologic: Non-focal  Extremities: Normal range of motion, No clubbing, cyanosis or edema  Vascular: Peripheral pulses palpable 2+ bilaterally    TELEMETRY: 	    ECG:  	  RADIOLOGY:  OTHER: 	  	  LABS:	 	    CARDIAC MARKERS:                              11.1   12.62 )-----------( 317      ( 08 Mar 2024 12:41 )             34.1     03-08    141  |  107  |  36<H>  ----------------------------<  89  4.1   |  16<L>  |  0.76    Ca    9.1      08 Mar 2024 16:28    TPro  7.7  /  Alb  3.3  /  TBili  0.5  /  DBili  x   /  AST  23  /  ALT  10  /  AlkPhos  80  03-08    proBNP:   Lipid Profile:   HgA1c:   TSH:   PT/INR - ( 08 Mar 2024 12:41 )   PT: 13.5 sec;   INR: 1.30 ratio         PTT - ( 08 Mar 2024 12:41 )  PTT:34.1 sec    PREVIOUS DIAGNOSTIC TESTING:      < from: 12 Lead ECG (02.26.24 @ 18:56) >  Diagnosis Line Atrial-sensed ventricular-paced rhythm  ABNORMAL ECG  WHEN COMPARED WITH ECG OF 23-FEB-2024 18:41,  SIGNIFICANT CHANGES HAVE OCCURRED    Troponin T, High Sensitivity (03.08.24 @ 16:28)    Troponin T, High Sensitivity Result: 180: *  *  Rapid upward or downward changes in high-sensitivity troponin levels  suggest acute myocardial injury. Renal impairment may cause sustained  troponin elevations.  Normal: <6 - 14 ng/L  Indeterminate: 15-51 ng/L  Elevated: > 51 ng/L  See [email or web address]/test/[email or web address] on the [facility] intranet for more  information ng/L    < from: CT Head No Cont (03.08.24 @ 15:53) >  INTERPRETATION:  Clinical indication: Altered mental status    Multiple axial sections were performed from the base skull to vertex   without contrast enhancement. Coronal and sagittal reconstructions were   performed    This exam compared prior head CT performed September 13, 2023.    Extensive parenchymal volume loss and chronic microvessel ischemic   changes again seen and unchanged    There isno acute hemorrhage mass or mass effect seen    Evaluation of the osseous structures with the appropriate window appears   unremarkable.    The visualized paranasal sinuses mastoid and middle ear regions appear   clear.    IMPRESSION: Stable exam.

## 2024-03-08 NOTE — ED PROVIDER NOTE - CLINICAL SUMMARY MEDICAL DECISION MAKING FREE TEXT BOX
89 yo F w/ PMHx of malnutrition, HTN, pacemaker, hypothyroidism, and mild cognitive impairment presents for 1 day of mental status changes, decreased p.o. intake, trouble chewing/drinking, and chest congestion.  Vital signs are unremarkable.  Physical exam is remarkable for A&Ox0, incoherent speech, not compliant with commands.  Considering mental status change and complaints of chest congestion, concern for intracranial process versus metabolic cephalopathy versus electrolyte derangement versus occult infection.  Jarrod for sepsis workup including CT head and chest x-ray.

## 2024-03-08 NOTE — H&P ADULT - NSHPLABSRESULTS_GEN_ALL_CORE
LABS:                        11.1   12.62 )-----------( 317      ( 08 Mar 2024 12:41 )             34.1     03-08    142  |  105  |  36<H>  ----------------------------<  103<H>  3.8   |  19<L>  |  0.76    Ca    9.4      08 Mar 2024 12:41    TPro  7.7  /  Alb  3.3  /  TBili  0.5  /  DBili  x   /  AST  23  /  ALT  10  /  AlkPhos  80  03-08    PT/INR - ( 08 Mar 2024 12:41 )   PT: 13.5 sec;   INR: 1.30 ratio         PTT - ( 08 Mar 2024 12:41 )  PTT:34.1 sec      Urinalysis Basic - ( 08 Mar 2024 12:41 )    Color: x / Appearance: x / SG: x / pH: x  Gluc: 103 mg/dL / Ketone: x  / Bili: x / Urobili: x   Blood: x / Protein: x / Nitrite: x   Leuk Esterase: x / RBC: x / WBC x   Sq Epi: x / Non Sq Epi: x / Bacteria: x          03-08 @ 12:34  3.6  45      Thyroid Stimulating Hormone, Serum: 2.48 uIU/mL (02-28 @ 06:51)

## 2024-03-08 NOTE — H&P ADULT - NSHPPHYSICALEXAM_GEN_ALL_CORE
T(C): 36.6 (03-08-24 @ 11:22), Max: 36.6 (03-08-24 @ 11:22)  HR: 88 (03-08-24 @ 11:22) (88 - 88)  BP: 113/77 (03-08-24 @ 11:22) (113/77 - 113/77)  RR: 18 (03-08-24 @ 11:22) (18 - 18)  SpO2: 96% (03-08-24 @ 11:22) (96% - 96%)  GENERAL: NAD, lying in bed comfortably  HEAD:  Atraumatic, normocephalic  EYES: EOMI, PERRLA, conjunctiva and sclera clear  NECK: Supple, trachea midline, no JVD  HEART: Regular rate and rhythm, no murmurs, rubs, or gallops  LUNGS: Unlabored respirations.  Clear to auscultation bilaterally, no crackles, wheezing, or rhonchi  ABDOMEN: Soft, nontender, nondistended, +BS  EXTREMITIES: 2+ peripheral pulses bilaterally. No clubbing, cyanosis, or edema  NERVOUS SYSTEM:   dementia  SKIN: No rashes or lesions T(C): 36.6 (03-08-24 @ 11:22), Max: 36.6 (03-08-24 @ 11:22)  HR: 88 (03-08-24 @ 11:22) (88 - 88)  BP: 113/77 (03-08-24 @ 11:22) (113/77 - 113/77)  RR: 18 (03-08-24 @ 11:22) (18 - 18)  SpO2: 96% (03-08-24 @ 11:22) (96% - 96%)  GENERAL: NAD, lying in bed comfortably  HEAD:  Atraumatic, normocephalic  EYES: EOMI, PERRLA, conjunctiva and sclera clear  NECK: Supple, trachea midline, no JVD  HEART: Regular rate and rhythm, no murmurs, rubs, or gallops  LUNGS: Unlabored respirations.  . few  coarse  b/sounds  ABDOMEN: Soft, nontender, nondistended, +BS  EXTREMITIES: 2+ peripheral pulses bilaterally. No clubbing, cyanosis, or edema  NERVOUS SYSTEM:   dementia/  lethargic  SKIN: No rashes or lesions

## 2024-03-08 NOTE — H&P ADULT - ASSESSMENT
89-year-old female,       h/o  hypertension, hyperlipidemia, dementia,    had  flu, on  last  visit         presented to the emergency department,  for  ams.  ftt.  weakness      pt is  afebrile,  wbc   noted.  cxr  prelim,  no  pna      hx of dementia,         on haldol     h/o  SSSSx, , s/p PPM,  interrogation, [facility],on recent   visit     PVD     now  with with syncope and unresponsiveness  HTN,     on    toprol,/  will  hold  cozaar    dvt  ppx    will  discuss  goc  with  family    dr cho  will  assume  care       rad       89-year-old female,       h/o  hypertension, hyperlipidemia, dementia,    had  flu, on  last  visit         presented to the emergency department,  for  ams.  ftt.  weakness      pt is  afebrile,  wbc   noted.  cxr  prelim,  no  pna      ct  head,  pending     hx of dementia,         on haldol     h/o  SSSSx, , s/p PPM,  interrogation, [facility],on recent   visit     PVD     now  with with syncope and unresponsiveness  HTN,     on    toprol,/  will  hold  cozaar    dvt  ppx     per  ish,   pt  is  dnr.  dni    dr cho  will  assume  care              89-year-old female,       h/o  hypertension, hyperlipidemia, dementia,    had  flu, on  last  visit         presented to the emergency department,  for  ams.  ftt.  weakness /  unable  to  swallow  food,  with  food  reamianing  in  mouth          pt is  afebrile,  wbc   noted.  cxr  prelim,  no  pna           suspect   aspiration.  inability  to  handle  swallowing  process       ?  aspiration  pna, ,  on iv  rocephin       ct  head,  pending        swallow  eval/  npo        on iv  fluids      hx of dementia,          hold  haldol,  given  ams     h/o  SSSSx, , s/p PPM,  interrogation, [facility] ,on recent   visit     PVD  HTN,     on    topol.  cozaar/  will  hold  po  meds          if high,  then  willl need   i/  Molst  signed  by  er  dr dr cho  will  assume  care              89-year-old female,       h/o  hypertension, hyperlipidemia, dementia, PPM    had  flu, on  last  visit  , here        presented to the emergency department,      *     for  ams.  ftt.  weakness /  unable  to  swallow  food,  with  food   being  retained  in  mouth             pt is  afebrile,  wbc   noted.  cxr  prelim,  no  pna             suspect   aspiration.  inability  to  handle  swallowing  process           ?  aspiration  pna, ,  on iv  rocephin             CT   head,  pending            swallow  eval/  npo            on iv  fluids     *     elevated  troponin of  ?  significance/  will  not  follow,  given  advance d  age  of  89.  dementia,  non  verbal  sttaus    *     hx of dementia,                hold  haldol,  given  ams    *    h/o  SSSSx, ,   has   PPM,   [facility]                 s/p interrogation, Son recent   visit    *   PVD   *    HTN,            on    topol.  cozaar/  will  hold  po  meds              if  bp  high,  then  will  need  iv  med     *  Hypothyroid,   on iv  synthroid               pt  is  DNR/ DNI, //   Molst  signed  by  er      discussed  with  ish cho  will  assume  care              89-year-old female,       h/o  hypertension, hyperlipidemia, dementia, PPM    had  flu, on  last  visit  , here        presented to the emergency department,      *     for  ams.  ftt.  weakness /  unable  to  swallow  food,  with  food   being  retained  in  mouth        bed  bound.  non verbal.  mouth persistently  open             pt is  afebrile,  wbc   noted.  cxr  prelim,  no  pna             suspect   aspiration.  inability  to  handle  swallowing  process           ?  aspiration  pna, ,  on iv  rocephin             CT   head,  pending            swallow  eval/  npo            on iv  fluids     *     elevated  troponin of  ?  significance/  will  not  follow,  given  advance d  age  of  89.  dementia,  non  verbal  sttaus    *     hx of dementia,                hold  haldol,  given  ams    *    h/o  SSSSx, ,   has   PPM,   [facility]                 s/p interrogation, Son recent   visit    *   PVD   *    HTN,            on    topol.  cozaar/  will  hold  po  meds              if  bp  high,  then  will  need  iv  med     *  Hypothyroid,   on iv  synthroid    ideal  for  pallaitive /  hospice  care              pt  is  DNR/ DNI, //   Molst  signed  by  er      discussed  with  nelonnie cho  will  assume  care

## 2024-03-08 NOTE — CONSULT NOTE ADULT - ASSESSMENT
89 yo F w/ PMHx of malnutrition, HTN, pacemaker, hypothyroidism, and mild cognitive impairment presents for 1 day of mental status changes, decreased p.o. intake, trouble chewing/drinking, and chest congestion. After speaking with niece (Rebecca Chilel), patient was recently admitted to [facility] for mental status change during which time her pacemaker was interrogated and she had sepsis workup.  She recently was found to have the flu.  Patient was at baseline (3/2)    and able to hold conversations.  HPI limited due to limited paperwork and patient's altered mental status. She presents from [facility] limited paperwork.  pt is well known to me with sig cardiac hx with increase trop  increase trop ?demand ischemia  asa daily  ppm recently has been checked  check tsh  dvt prophylaxis  ?metabolic encephalopathy, may add sodium bicarb, check ABG

## 2024-03-08 NOTE — PATIENT PROFILE ADULT - FALL HARM RISK - HARM RISK INTERVENTIONS

## 2024-03-08 NOTE — ED PROVIDER NOTE - PHYSICAL EXAMINATION
GENERAL: no acute distress, cachectic body habitus  HEENT: atraumatic, normocephalic, vision grossly intact, EOMI, no conjunctivitis or discharge, hearing grossly intact, no nasal discharge or epistaxis, clear pharynx  CV: regular rate, normal rhythm, normal S1/S2, no murmurs/rubs, no cyanosis  PULM: 93-95% on RA, normal work of breathing, clear breath sounds in b/l upper/lower lung fields, no crackles/rales/rhonchi/wheezing  GI: soft/non-tender/nondistended abdomen, no guarding or rebound tenderness, no palpable masses  NEURO: A&Ox0, incoherent speech, not compliant with commands  MSK: no joint tenderness/swelling/erythema, ranging all extremities with no appreciable loss of ROM  EXT: no peripheral edema, no calf tenderness, no redness or swelling  SKIN: warm, dry, and intact, no rashes

## 2024-03-08 NOTE — ED PROVIDER NOTE - PROGRESS NOTE DETAILS
Ino PGY2: Spoke with niece who made patient DNR/DNI.  Labs remarkable for WBC 12.62, BUN 36, troponin 206.  EKG was AV paced.  Admitted to Dr. Guerra after speaking with Dr. Cee Merlos.  On reassessment, patient still seems obtunded concern for metabolic encephalopathy versus occult infection.  Placed a straight catheter for residual urine.

## 2024-03-08 NOTE — H&P ADULT - HISTORY OF PRESENT ILLNESS
89 yo F      w/ PMHx of malnutrition, HTN, pacemaker, hypothyroidism, and mild cognitive impairment        presents for 1 day of mental status changes, decreased p.o. intake, trouble chewing/drinking, and chest congestion.       niece (Rebecca Chilel      patient was recently admitted to [facility] for mental status change during which time her pacemaker was interrogated and she had sepsis workup.       pt  recently was found to have the flu.  Patient was at baseline (3/2) - A&Ox3 and able to hold conversations.      presents from [facility]     89 yo F      w/ PMHx of malnutrition, HTN, pacemaker, hypothyroidism, and mild cognitive impairment        presents for 1 day of mental status changes, decreased p.o. intake, trouble chewing/drinking, and chest congestion.       niece   Rebecca Chilel.  rather  upset,  pt  ha s been to  er,couple  times  ,a s of  late      patient was recently admitted to [facility] for mental status change during which time her pacemaker was interrogated and she had sepsis workup.       pt  recently was found to have the flu.  Patient was at baseline (3/2) - A&Ox3 and able to hold conversations.      presents from [facility]

## 2024-03-08 NOTE — ED PROVIDER NOTE - OBJECTIVE STATEMENT
89 yo F w/ PMHx of malnutrition, HTN, pacemaker, hypothyroidism, and mild cognitive impairment presents for 1 day of mental status changes, decreased p.o. intake, trouble chewing/drinking, and chest congestion. After speaking with niece (Rebecca Chilel), patient was recently admitted to [facility] for mental status change during which time her pacemaker was interrogated and she had sepsis workup.  She recently was found to have the flu.  Patient was at baseline (3/2) - A&Ox3 and able to hold conversations.  HPI limited due to limited paperwork and patient's altered mental status. She presents from [facility] limited paperwork.     Niece (Rebecca Chilel) wishes to make patient DNR/DNI (3/8/24, 12:02PM)    PCP: Dr. Bob Wilkinson

## 2024-03-08 NOTE — ED ADULT NURSE NOTE - OBJECTIVE STATEMENT
89y Female PMHx malnutrition, HTN, pacemaker, hypothyroidism and cognitive impairment BIBEMS for coughing, dec PO intake, and mental status decline. As per EMS pt has also had trouble chewing/drinking, and some chest congestion. Pt unable to answer questions and is currently A&Ox1. Pt with junky cough and dry mouth. IV placed, labs drawn and sent, seen and eval by MD. Kei in lowest position and locked, call bell within reach.

## 2024-03-08 NOTE — ED PROVIDER NOTE - ATTENDING CONTRIBUTION TO CARE
89 yo F w/ PMHx of malnutrition, HTN, pacemaker, hypothyroidism, and mild cognitive impairment presents for 1 day of mental status changes, decreased p.o. intake, trouble chewing/drinking, and chest congestion.  pt was here recently for flu and sycnope to readmit, spoke with ish dnr/dni to admit for likely FTT, dehydration possible viral illness vs occult infection.

## 2024-03-08 NOTE — PATIENT PROFILE ADULT - FUNCTIONAL ASSESSMENT - BASIC MOBILITY 6.
2-calculated by average/Not able to assess (calculate score using [facility] averaging method)

## 2024-03-09 NOTE — CONSULT NOTE ADULT - SUBJECTIVE AND OBJECTIVE BOX
Neurology Consult    Reason for Consult: Patient is a 89y old  Female who presents with a chief complaint of ams/  ftt (09 Mar 2024 11:23)      HPI:  89 yo F with  malnutrition, HTN, pacemaker, hypothyroidism, and mild cognitive impairment presents for 1 day of mental status changes, decreased p.o. intake, trouble chewing/drinking, and chest congestion.  patient was recently admitted to [facility] for mental status change during which time her pacemaker was interrogated and she had sepsis workup.     pt  recently was found to have the flu.  Patient was at baseline (3/2) - A&Ox3 and able to hold conversations.    presents from [facility]         PAST MEDICAL & SURGICAL HISTORY:  Hypertension      Mild cognitive impairment      H/O thyroid disease      Dementia      Hard of hearing          Allergies: Allergies    No Known Allergies    Intolerances        Social History: Denies toxic habits including tobacco, ETOH or illicit drugs.    Family History: FAMILY HISTORY:  . No family history of strokes    Medications: MEDICATIONS  (STANDING):  cefTRIAXone   IVPB 1000 milliGRAM(s) IV Intermittent every 24 hours  dextrose 5% + sodium chloride 0.9%. 1000 milliLiter(s) (60 mL/Hr) IV Continuous <Continuous>  heparin   Injectable 5000 Unit(s) SubCutaneous every 12 hours  latanoprost 0.005% Ophthalmic Solution 1 Drop(s) Both EYES at bedtime  levothyroxine Injectable 25 MICROGram(s) IV Push at bedtime  metoprolol tartrate Injectable 2.5 milliGRAM(s) IV Push every 12 hours    MEDICATIONS  (PRN):      Review of Systems: limited given mental status   CONSTITUTIONAL:  No weight loss, fever, chills, weakness or fatigue.  HEENT:  Eyes:  No visual loss, blurred vision, double vision or yellow sclera. Ears, Nose, Throat:  No hearing loss, sneezing, congestion, runny nose or sore throat.  SKIN:  No rash or itching.  CARDIOVASCULAR:  No chest pain, chest pressure or chest discomfort. No palpitations or edema.  RESPIRATORY:  No shortness of breath, cough or sputum.  GASTROINTESTINAL:  No anorexia, nausea, vomiting or diarrhea. No abdominal pain or blood.  GENITOURINARY:  No burning on urination or incontinence   NEUROLOGICAL:  No headache, dizziness, syncope, paralysis, ataxia, numbness or tingling in the extremities. No change in bowel or bladder control. no limb weakness. no vision changes.   MUSCULOSKELETAL:  No muscle, back pain, joint pain or stiffness.  HEMATOLOGIC:  No anemia, bleeding or bruising.  LYMPHATICS:  No enlarged nodes. No history of splenectomy.  PSYCHIATRIC:  No history of depression or anxiety.  ENDOCRINOLOGIC:  No reports of sweating, cold or heat intolerance. No polyuria or polydipsia.      Vitals:  Vital Signs Last 24 Hrs  T(C): 37.1 (09 Mar 2024 12:05), Max: 37.1 (09 Mar 2024 00:31)  T(F): 98.8 (09 Mar 2024 12:05), Max: 98.8 (09 Mar 2024 12:05)  HR: 104 (09 Mar 2024 12:05) (71 - 117)  BP: 112/69 (09 Mar 2024 12:05) (109/55 - 134/78)  BP(mean): 72 (08 Mar 2024 16:20) (72 - 72)  RR: 16 (09 Mar 2024 12:05) (16 - 22)  SpO2: 94% (09 Mar 2024 12:05) (94% - 96%)    Parameters below as of 09 Mar 2024 12:05  Patient On (Oxygen Delivery Method): room air        General Exam:   General Appearance: Appropriately dressed and in no acute distress       Head: Normocephalic, atraumatic and no dysmorphic features  Ear, Nose, and Throat: Moist mucous membranes  CVS: S1S2+  Resp: No SOB, no wheeze or rhonchi  GI: soft NT/ND  Extremities: No edema or cyanosis  Skin: No bruises or rashes     Neurological Exam:  Mental Status: Awake, alert and oriented x 1.  Able to follow simple verbal commands. Able to name and repeat. fluent speech. No obvious aphasia + mild dysarthria noted.   Cranial Nerves: PERRL, EOMI, VFFC, sensation V1-V3 intact,  no obvious facial asymmetry, equal elevation of palate, scm/trap 5/5, tongue is midline on protrusion. no obvious papilledema on fundoscopic exam.[place]   Motor: generalized weakness. SELLERS at least 4/5    Sensation: Intact to light touch and pinprick throughout. no right/left confusion. no extinction to tactile on DSS.    Reflexes: 1+ throughout at biceps, brachioradialis, triceps, patellars and ankles bilaterally and equal. No clonus. R toe and L toe were both downgoing.  Coordination: No dysmetria on FNF or HKS  Gait: deferred     Data/Labs/Imaging which I personally reviewed.     Labs:     CBC Full  -  ( 09 Mar 2024 11:04 )  WBC Count : 10.28 K/uL  RBC Count : 3.53 M/uL  Hemoglobin : 9.9 g/dL  Hematocrit : 31.2 %  Platelet Count - Automated : 359 K/uL  Mean Cell Volume : 88.4 fl  Mean Cell Hemoglobin : 28.0 pg  Mean Cell Hemoglobin Concentration : 31.7 gm/dL  Auto Neutrophil # : x  Auto Lymphocyte # : x  Auto Monocyte # : x  Auto Eosinophil # : x  Auto Basophil # : x  Auto Neutrophil % : x  Auto Lymphocyte % : x  Auto Monocyte % : x  Auto Eosinophil % : x  Auto Basophil % : x    03-09    146<H>  |  112<H>  |  27<H>  ----------------------------<  133<H>  3.7   |  20<L>  |  0.71    Ca    8.6      09 Mar 2024 11:03    TPro  7.7  /  Alb  3.3  /  TBili  0.5  /  DBili  x   /  AST  23  /  ALT  10  /  AlkPhos  80  03-08    LIVER FUNCTIONS - ( 08 Mar 2024 12:41 )  Alb: 3.3 g/dL / Pro: 7.7 g/dL / ALK PHOS: 80 U/L / ALT: 10 U/L / AST: 23 U/L / GGT: x           PT/INR - ( 08 Mar 2024 12:41 )   PT: 13.5 sec;   INR: 1.30 ratio         PTT - ( 08 Mar 2024 12:41 )  PTT:34.1 sec  Urinalysis Basic - ( 09 Mar 2024 11:03 )    Color: x / Appearance: x / SG: x / pH: x  Gluc: 133 mg/dL / Ketone: x  / Bili: x / Urobili: x   Blood: x / Protein: x / Nitrite: x   Leuk Esterase: x / RBC: x / WBC x   Sq Epi: x / Non Sq Epi: x / Bacteria: x      < from: CT Head No Cont (03.08.24 @ 15:53) >    ACC: [id number] EXAM:  CT BRAIN   ORDERED BY:  MARY SUAREZ     PROCEDURE DATE:  03/08/2024          INTERPRETATION:  Clinical indication: Altered mental status    Multiple axial sections were performed from the base skull to vertex   without contrast enhancement. Coronal and sagittal reconstructions were   performed    This exam compared prior head CT performed September 13, 2023.    Extensive parenchymal volume loss and chronic microvessel ischemic   changes again seen and unchanged    There isno acute hemorrhage mass or mass effect seen    Evaluation of the osseous structures with the appropriate window appears   unremarkable.    The visualized paranasal sinuses mastoid and middle ear regions appear   clear.    IMPRESSION: Stable exam.    --- End of Report ---            LIZETH CALDWELL MD; Attending Radiologist  This document has been electronically signed. Mar  8 2024  3:56PM    < end of copied text >

## 2024-03-09 NOTE — CONSULT NOTE ADULT - ASSESSMENT
89 yo  F with  malnutrition/ FTT, HTN, pacemaker, hypothyroidism, and mild cognitive impairment presents for 1 day of mental status changes, decreased p.o. intake, trouble chewing/drinking, and chest congestion.  recent admission for sepsis. neuro called for dysarthria   9/13/23 CTH chronic changes.   CTA H/N neg 9/13/23   3/8 CTH chronic microvascular changes and extensive volume loss  ; does have evidence of chronic small vessel infarcts on CTH .     o/e AAOx1, mild dysarthria, SELLERS at least 4/5, cachectic appearing   Imprssion:   1) dysarthria with non focal exam possible related to toxic metabolic encephalopathy; lower suspicion for stroke    2) dementia   3) chronic infarct on CTH , not new.  on CTH from 6/2023     - unclear if patient can even have MRI with PPM.  also may not tolerate.  can consider repeat CTH as alternative to r/o stroke if no improvement with treatment of infection but low suspicion; would not  already placing on asa and statin    - b12, RPR, TSH   - treatment of infection with CTX.    - would place on ASA 81 if no contraindication and low dose statin atorvastatin 10mg QHS for secondary stroke prevention. chronic ischemic changes on CTH   - b12, RPR, TSH if not already checked   - PT/OT   - check FS, glucose control <180  - GI/DVT ppx  - Counseling on diet, exercise, and medication adherence was done  - Counseling on smoking cessation and alcohol consumption offered when appropriate.  - Pain assessed and judicious use of narcotics when appropriate was discussed.    - Stroke education given when appropriate.  - Importance of fall prevention discussed.   - Differential diagnosis and plan of care discussed with patient and/or family and primary team  - Thank you for allowing me to participate in the care of this patient. Call with questions.   spoke with ACP  Almas Xiao MD  Vascular Neurology  Office: [phone]

## 2024-03-09 NOTE — PROGRESS NOTE ADULT - ASSESSMENT
89-year-old female,       h/o  hypertension, hyperlipidemia, dementia, PPM.  pt   had  flu, on  last  visit         presented to  er      *   for  ams.  ftt.  weakness /  unable  to  swallow  food,  with  food   being  retained  in  mouth         bed  bound.  non verbal.  mouth persistently  open         pt is  afebrile,  wbc   noted.  cxr  prelim,  no  pna         suspect   aspiration.  inability  to  handle  swallowing  process.   ?  aspiration  pna, ,  on iv  rocephin          CT   head,   no infarct        swallow  eval/  npo        on iv  fluids     *     elevated  troponin of  ?  significance/   seen by card/ will  not  pursue  w/p    *     hx of dementia,                hold  haldol,  given  ams    *    h/o  SSSSx, ,   has   PPM, [facility]                 s/p interrogation, on recent   visit    *   PVD   *    HTN,            on    topol.  cozaar/  will  hold  po  meds              if  bp  high,  then  will  need  iv  med     *  Hypothyroid,   on iv  synthroid    ideal  for  pallaitive /  hospice  care              pt  is  DNR/ DNI, //   Molst  signed  by  er      discussed  with  nelonnie cho  will  assume  care              89-year-old female,       h/o  hypertension, hyperlipidemia, dementia, PPM.  pt   had  flu, on  last  visit         presented to  er      *   for  ams.  ftt.  weakness /  unable  to  swallow  food,  with  food   being  retained  in  mouth         bed  bound.  non verbal.  mouth persistently  open         pt is  afebrile,  wbc   noted.  cxr  prelim,  no  pna         suspect   aspiration.  inability  to  handle  swallowing  process.   ?  aspiration  pna, ,  on iv  rocephin          CT   head,   no infarct        swallow  eval/  npo        on iv  fluids     *     elevated  troponin of  ?  significance/   seen by card/ will  not  pursue  w/p    *     hx of dementia,                hold  haldol,  given  ams    *    h/o  SSSSx, ,   has   PPM, [facility]                 s/p interrogation, on recent   visit    *   PVD   *    HTN,            on    topol.  cozaar/  will  hold  po  meds      *  Hypothyroid,   on iv  synthroid      more  awake. remains  aspiration  risk    ish   concerned     about  stroke/ henece   neuro  requested/  ct ehad, no acute stroke              pt  is  DNR/ DNI, //   Molst  signed  by  er      discussed  with  ish cho  will  assume  care

## 2024-03-10 NOTE — SWALLOW BEDSIDE ASSESSMENT ADULT - SLP PERTINENT HISTORY OF CURRENT PROBLEM
89 yo F w/ PMHx of malnutrition, HTN, pacemaker, hypothyroidism, and mild cognitive impairment presents for 1 day of mental status changes, decreased p.o. intake, trouble chewing/drinking, and chest congestion. Patient was recently admitted to [facility] for mental status change during which time her pacemaker was interrogated and she had sepsis workup. Pt recently was found to have the flu. Patient was at baseline (3/2) - A&Ox3 and able to hold conversations. Pt presents from [facility].  Pt is DNR/ DNI, Molst signed by ER

## 2024-03-10 NOTE — SWALLOW BEDSIDE ASSESSMENT ADULT - ORAL PHASE
Pt noted to talk during intake; Pt endorsing difficulty initiating swallow sequence; Majority of thin puree bolus remains in anterior portion of the mouth, removed at end of exam with toothette/Decreased anterior-posterior movement of the bolus/Delayed oral transit time/Stasis in anterior sulcus

## 2024-03-10 NOTE — DIETITIAN INITIAL EVALUATION ADULT - PERTINENT MEDS FT
MEDICATIONS  (STANDING):  cefTRIAXone   IVPB 1000 milliGRAM(s) IV Intermittent every 24 hours  dextrose 5% + sodium chloride 0.9%. 1000 milliLiter(s) (60 mL/Hr) IV Continuous <Continuous>  heparin   Injectable 5000 Unit(s) SubCutaneous every 12 hours  latanoprost 0.005% Ophthalmic Solution 1 Drop(s) Both EYES at bedtime  levothyroxine Injectable 25 MICROGram(s) IV Push at bedtime  metoprolol tartrate Injectable 2.5 milliGRAM(s) IV Push every 12 hours  potassium chloride    Tablet ER 40 milliEquivalent(s) Oral every 4 hours  potassium phosphate IVPB 30 milliMole(s) IV Intermittent once    MEDICATIONS  (PRN):

## 2024-03-10 NOTE — DIETITIAN INITIAL EVALUATION ADULT - PERTINENT LABORATORY DATA
03-10    148<H>  |  115<H>  |  24<H>  ----------------------------<  146<H>  2.8<LL>   |  22  |  0.83    Ca    8.7      10 Mar 2024 07:21  Phos  1.7     03-10  Mg     1.8     03-10    TPro  7.7  /  Alb  3.3  /  TBili  0.5  /  DBili  x   /  AST  23  /  ALT  10  /  AlkPhos  80  03-08  POCT Blood Glucose.: 134 mg/dL (03-10-24 @ 06:32)

## 2024-03-10 NOTE — SWALLOW BEDSIDE ASSESSMENT ADULT - SLP GENERAL OBSERVATIONS
Pt encountered in bed, awake, alert, on room air, cachetic appearing. +[place]. Speech intermittently unintelligible iso xerostomia. As per d/w RN prior to entrance to room, pt provided with oral care via toothette (present at bedside); however, w/ limited success iso pt participation and severity of dryness. Pt oriented to name and place only.

## 2024-03-10 NOTE — PROGRESS NOTE ADULT - NUTRITIONAL ASSESSMENT
This patient has been assessed with a concern for Malnutrition and has been determined to have a diagnosis/diagnoses of Severe protein-calorie malnutrition and Underweight (BMI < 19).    This patient is being managed with:   Diet NPO-  Entered: Mar  8 2024  6:45PM

## 2024-03-10 NOTE — DIETITIAN INITIAL EVALUATION ADULT - OTHER INFO
- Low potassium and phosphorus levels on AM labs consistent with refeeding syndrome; repleted. Currently on IVF of D5% + NS @ 60ml/hr.   - Hypernatremia in setting of poor PO intake    Weight Hx: per previous RD note pt 90lbs; however bedscale obtained 2/29/24 of 79lbs. Current dosing wt 58.6lbs - ?accuracy. Bedscale obtained by this writer at time of visit of 80lbs, likely truly lower given blankets/pillows on bed.

## 2024-03-10 NOTE — SWALLOW BEDSIDE ASSESSMENT ADULT - COMMENTS
Per H + P -->suspect aspiration. inability to handle swallowing process; ? aspiration pna, on iv rocephin   Neuro consulted-->o/e AAOx1, mild dysarthria, SELLERS at least 4/5, cachectic appearing   Impression: 1) dysarthria with non focal exam possible related to toxic metabolic encephalopathy; lower suspicion for stroke 2) dementia 3) chronic infarct on CTH , not new.  on CTH from 2023       Imagin/08: CXR: Clear lungs.  : CTH: Extensive parenchymal volume loss and chronic microvessel ischemic changes again seen and unchanged.    **Not previously known to this service.

## 2024-03-10 NOTE — PROGRESS NOTE ADULT - ASSESSMENT
89 yo  F with  malnutrition/ FTT, HTN, pacemaker, hypothyroidism, and mild cognitive impairment presents for 1 day of mental status changes, decreased p.o. intake, trouble chewing/drinking, and chest congestion.  recent admission for sepsis. neuro called for dysarthria   9/13/23 CTH chronic changes.   CTA H/N neg 9/13/23   3/8 CTH chronic microvascular changes and extensive volume loss  ; does have evidence of chronic small vessel infarcts on CTH .     o/e AAOx1, mild dysarthria, SELLERS at least 4/5, cachectic appearing   Imprssion:   1) dysarthria with non focal exam possible related to toxic metabolic encephalopathy; lower suspicion for stroke    2) dementia   3) chronic infarct on CTH , not new.  on CTH from 6/2023     - unclear if patient can even have MRI with PPM.  also may not tolerate.  can consider repeat CTH as alternative to r/o stroke if no improvement with treatment of infection but low suspicion; would not  already placing on asa and statin ; CTH pending    - b12, RPR, TSH(WNL)    - treatment of infection with CTX.    - would place on ASA 81 if no contraindication and low dose statin atorvastatin 10mg QHS for secondary stroke prevention. chronic ischemic changes on CTH   - PT/OT   - check FS, glucose control <180  - GI/DVT ppx      Almas Xiao MD  Vascular Neurology  Office: [phone]

## 2024-03-10 NOTE — SWALLOW BEDSIDE ASSESSMENT ADULT - SWALLOW EVAL: DIAGNOSIS
89 yo F w/ PMHx of malnutrition, mild cognitive impairment presents for AMS, poor p.o. intake, trouble chewing/drinking, and chest congestion. Pt p/w evidence of an oropharyngeal dysphagia exacerbated by xerostomia and mentation not supportive of adequate PO intake. With small amounts of conservative textures offered, pt with reduced bolus acceptance, prolonged A-P transit, w/ portion of bolus remaining in anterior sulcus. Premature spillover suspected upon palpation, w/ suspected decreased hyolaryngeal elevation and delayed trigger of the swallow. Pt with wet, gurgly vocal quality and cough s/p trials, indicative of laryngeal penetration/aspiration. PO diet contraindicated at this time. Consider GOC discussion re: means of nutrition/hydration iso pt's current swallow profile. Will followup pending results if clinically indicated.

## 2024-03-10 NOTE — DIETITIAN INITIAL EVALUATION ADULT - ADD RECOMMEND
PT IS AT HIGH RISK FOR REFEEDING SYNDROME. Add daily multivitamin (when feasible given NPO status) and thiamine 200mg/day (IV while NPO); monitor K, Phos, Mg at least DAILY and replete PRN. Diet advancement per discretion of SLP/team. No therapeutic restrictions indicated at this time. If pt fails swallow evaluation, address GOC pertaining artifical nutrition. Continue to monitor nutritional intake, labs, weights, BM, skin, clinical course.

## 2024-03-10 NOTE — SWALLOW BEDSIDE ASSESSMENT ADULT - SWALLOW EVAL: RECOMMENDED DIET
NPO, with non-oral nutrition/hydration/medications. Consider palliative consult/GOC discussion given current clinical profile and swallow presentation.

## 2024-03-10 NOTE — DIETITIAN INITIAL EVALUATION ADULT - ORAL INTAKE PTA/DIET HISTORY
Unable to obtain subjective information from pt at this time. Pt presents with poor PO intake, per previous RD notes with hx of severe malnutrition. Micronutrient supplementation PTA per H&P: multivitamin, folic acid, probiotic. NKFA or intolerances per chart.

## 2024-03-10 NOTE — SWALLOW BEDSIDE ASSESSMENT ADULT - ADDITIONAL RECOMMENDATIONS
Maintain frequent oral hygiene to prevent growth of bacterial pathogens.    GOAL: Pt will obtain safe and adequate nutrition/hydration/medications via least restrictive diet.

## 2024-03-11 NOTE — BH CONSULTATION LIAISON ASSESSMENT NOTE - HPI (INCLUDE ILLNESS QUALITY, SEVERITY, DURATION, TIMING, CONTEXT, MODIFYING FACTORS, ASSOCIATED SIGNS AND SYMPTOMS)
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 She has history of moderate to severe cognitive impairment. Latter is per Dr. VA Belcher, psychiatrist. Old chart from November 2021 says cognitive decline started in 2018. She was admitted here on 3/8/24 for one day history of mental status change and poor p.o. intake. Per P.A., pt. has pneumonia and UTI. The pt. had recent admission for mental status change, syncope, pacemaker, influenza. The pt.'s MD, Dr. Guerra and niece Ms. Miranda say the pt. has a history of agitation so given Haldol 0.50mg daily for the past 2 and a half years. Dr. Belcher was unaware of Haldol and says he prescribes Buspar 7.5mg bid and Mirtazepine 7.5 mg qhs to help her gain weight (she lost "a lot of weight" per niece). Not agitated here but is confused. She is NPO now. QTc on 3/8/24 was 509ms. Neurology note here says the pt. has dementia and possible delirium. Niece says the pt. cannot swallow well for the past 10 days and family prefers no Haldol. Off Haldol since admission.

## 2024-03-11 NOTE — CONSULT NOTE ADULT - ASSESSMENT
This is a 89 y/o F w/ PMHx of malnutrition, HTN, PPM, hypothrodism, mild congnitive impairment who presented initially on 3/8/24 for change in mental status, decreased PO intake, decreased PO intake, chest congestion. Pt was last noted at baseline on 3/2, AAOx3.   In the ER, pt was afebrile, saturating well on RA. WBC 12.6, now inreased to 15.9. U/A w/ pyuria to 64. Flu/RSV/COVID negative,   CXR on 3/8 with clear lungs. Pt started on Ceftriaxone for possible UTI, however UCx not sent.   Now with worsening leukocytosis and c/f PNA.     Pt was recently admitted from 2/27 to 2/29 for unresposiveness, found to be flu positive, PPM interogated. Treated w/ Tamiflu.     #Hypoxic respiratory failure   #Severe sepsis   #UTI  #C/f PNA   #Metabolic encepathopathy     Overall,  89 y/o F w/ PMHx of malnutrition, HTN, PPM, hypothyroidism mild cognitive impairment who presented initially on 3/8/24 for change in mental status, decreased PO intake, decreased PO intake, chest congestion, CTH w/o any changes.   Pt noted to have positive U/A, however UCx not sent, started on empiric CTX. WBC 12 on admission, initially improved, however now increased to 15, CXR with worsening R sided opacity?   Pt with increased respiratory rate and now on 2L O2.  This is concerning for possible aspiration PNA, would need to cover pseudomonas given recent hospital stay.   Would repeat workup, BCx x 2, UCx, obtain CT Chest, MRSA swab. Would expand abx to Zosyn.     Plan:   1. D/c Ceftriaxone, start Zosyn 3.375 g q12  2. Obtain BCx x 2, UCx, MRSA swab  3. CT Chest w/o contrast if stable   4. Would send sputum Cx if able     Discussed with patient's sister, niece at bedside, and primary team     Thank you for this consult. Inpatient ID team will follow.    Tonio Jaime M.D.  Attending Physician  Division of Infectious Diseases  Department of Medicine    Please contact through MS Teams message.  Office: [phone] (after 5 PM or weekend)

## 2024-03-11 NOTE — CONSULT NOTE ADULT - SUBJECTIVE AND OBJECTIVE BOX
NEPHROLOGY - [facility]    Patient seen and examined.    HPI:  89 yo F  w/ PMHx of malnutrition, HTN, pacemaker, hypothyroidism, and mild cognitive impairment presents for 1 day of mental status changes, decreased p.o. intake, trouble chewing/drinking, and chest congestion.  Patient was recently admitted to [facility] for mental status change during which time her pacemaker was interrogated and she had sepsis workup.       pt  recently was found to have the flu.  Patient was at baseline (3/2) - A&Ox3 and able to hold conversations.      presents from [facility]       All hx from the chart       PAST MEDICAL & SURGICAL HISTORY:  Hypertension      Mild cognitive impairment      H/O thyroid disease      Dementia      Hard of hearing          MEDICATIONS  (STANDING):  dextrose 5% + sodium chloride 0.45% with potassium chloride 20 mEq/L 1000 milliLiter(s) (60 mL/Hr) IV Continuous <Continuous>  heparin   Injectable 5000 Unit(s) SubCutaneous every 12 hours  latanoprost 0.005% Ophthalmic Solution 1 Drop(s) Both EYES at bedtime  levothyroxine Injectable 25 MICROGram(s) IV Push at bedtime  metoprolol tartrate Injectable 2.5 milliGRAM(s) IV Push every 12 hours  piperacillin/tazobactam IVPB.- 3.375 Gram(s) IV Intermittent once      Allergies    No Known Allergies    Intolerances        SOCIAL HISTORY:  Denies alcohol abuse, drug abuse or tobacco usage.     FAMILY HISTORY:      VITALS:  T(C): 36.4 (24 @ 10:59), Max: 36.5 (24 @ 04:11)  HR: 97 (24 @ 10:59) (97 - 98)  BP: 106/71 (24 @ 10:59) (105/69 - 106/71)  RR: 26 (24 @ 10:59) (18 - 26)  SpO2: 100% (24 @ 10:59) (95% - 100%)    REVIEW OF SYSTEMS:  unable     PHYSICAL EXAM:  Constitutional: NAD  HEENT: EOMI  Neck:  No JVD, supple   Respiratory: CTA B/L  Cardiovascular: S1 and S2, RRR  Gastrointestinal: + BS, soft, NT, ND  Extremities: No peripheral edema, + peripheral pulses  Neurological:   CN2-12 intact  Psychiatric: Normal mood, normal affect  : No Denny  Skin: No rashes, C/D/I  Access: Not applicable    I and O's:    03-10 @ 07:  -   @ 07:00  --------------------------------------------------------  IN: 1000 mL / OUT: 0 mL / NET: 1000 mL     @ 07: @ 14:49  --------------------------------------------------------  IN: 0 mL / OUT: 75 mL / NET: -75 mL          LABS:                        10.1   15.88 )-----------( 369      ( 11 Mar 2024 09:55 )             32.7     11    152<H>  |  121<H>  |  29<H>  ----------------------------<  161<H>  5.0   |  17<L>  |  0.98    Ca    8.2<L>      11 Mar 2024 09:55  Phos  3.6       Mg     1.8     03-10        URINE:  Urinalysis Basic - ( 11 Mar 2024 13:42 )    Color: Dark Yellow / Appearance: Turbid / S.021 / pH: x  Gluc: x / Ketone: Negative mg/dL  / Bili: Negative / Urobili: 0.2 mg/dL   Blood: x / Protein: 30 mg/dL / Nitrite: Positive   Leuk Esterase: Small / RBC: 161 /HPF /  /HPF   Sq Epi: x / Non Sq Epi: 3 /HPF / Bacteria: Negative /HPF        RADIOLOGY & ADDITIONAL STUDIES:     NEPHROLOGY - [facility]    Patient seen and examined.    HPI:  89 yo F  w/ PMHx of malnutrition, HTN, pacemaker, hypothyroidism, and mild cognitive impairment presents for 1 day of mental status changes, decreased p.o. intake, trouble chewing/drinking, and chest congestion.  Patient was recently admitted to [facility] for mental status change during which time her pacemaker was interrogated and she had sepsis workup.       pt  recently was found to have the flu.  Patient was at baseline (3/2) - A&Ox3 and able to hold conversations.      presents from [facility]       All hx from the chart and pt was unable to provide hx       PAST MEDICAL & SURGICAL HISTORY:  Hypertension      Mild cognitive impairment      H/O thyroid disease      Dementia      Hard of hearing          MEDICATIONS  (STANDING):  dextrose 5% + sodium chloride 0.45% with potassium chloride 20 mEq/L 1000 milliLiter(s) (60 mL/Hr) IV Continuous <Continuous>  heparin   Injectable 5000 Unit(s) SubCutaneous every 12 hours  latanoprost 0.005% Ophthalmic Solution 1 Drop(s) Both EYES at bedtime  levothyroxine Injectable 25 MICROGram(s) IV Push at bedtime  metoprolol tartrate Injectable 2.5 milliGRAM(s) IV Push every 12 hours  piperacillin/tazobactam IVPB.- 3.375 Gram(s) IV Intermittent once      Allergies    No Known Allergies    Intolerances        SOCIAL HISTORY:  Denies alcohol abuse, drug abuse or tobacco usage.     FAMILY HISTORY:      VITALS:  T(C): 36.4 (24 @ 10:59), Max: 36.5 (24 @ 04:11)  HR: 97 (24 @ 10:59) (97 - 98)  BP: 106/71 (24 @ 10:59) (105/69 - 106/71)  RR: 26 (24 @ 10:59) (18 - 26)  SpO2: 100% (24 @ 10:59) (95% - 100%)    REVIEW OF SYSTEMS:  unable     PHYSICAL EXAM:  Constitutional: NAD  HEENT: EOMI  Neck:  No JVD, supple   Respiratory: CTA B/L  Cardiovascular: S1 and S2, RRR  Gastrointestinal: + BS, soft, NT, ND  Extremities: No peripheral edema, + peripheral pulses  Neurological:   CN2-12 intact  Psychiatric: Normal mood, normal affect  : No Denny  Skin: No rashes, C/D/I  Access: Not applicable    I and O's:    03-10 @ 07: @ 07:00  --------------------------------------------------------  IN: 1000 mL / OUT: 0 mL / NET: 1000 mL     @ 07: @ 14:49  --------------------------------------------------------  IN: 0 mL / OUT: 75 mL / NET: -75 mL          LABS:                        10.1   15.88 )-----------( 369      ( 11 Mar 2024 09:55 )             32.7     03-11    152<H>  |  121<H>  |  29<H>  ----------------------------<  161<H>  5.0   |  17<L>  |  0.98    Ca    8.2<L>      11 Mar 2024 09:55  Phos  3.6     11  Mg     1.8     03-10        URINE:  Urinalysis Basic - ( 11 Mar 2024 13:42 )    Color: Dark Yellow / Appearance: Turbid / S.021 / pH: x  Gluc: x / Ketone: Negative mg/dL  / Bili: Negative / Urobili: 0.2 mg/dL   Blood: x / Protein: 30 mg/dL / Nitrite: Positive   Leuk Esterase: Small / RBC: 161 /HPF /  /HPF   Sq Epi: x / Non Sq Epi: 3 /HPF / Bacteria: Negative /HPF        RADIOLOGY & ADDITIONAL STUDIES:     NEPHROLOGY - [facility]    Patient seen and examined.    HPI:  89 yo F  w/ PMHx of malnutrition, HTN, pacemaker, hypothyroidism, and mild cognitive impairment presents for 1 day of mental status changes, decreased p.o. intake, trouble chewing/drinking, and chest congestion.  Patient was recently admitted to [facility] for mental status change during which time her pacemaker was interrogated and she had sepsis workup.       pt  recently was found to have the flu.  Patient was at baseline (3/2) - A&Ox3 and able to hold conversations.      presents from [facility]       All hx from the chart and pt was unable to provide hx       PAST MEDICAL & SURGICAL HISTORY:  Hypertension      Mild cognitive impairment      H/O thyroid disease      Dementia      Hard of hearing          MEDICATIONS  (STANDING):  dextrose 5% + sodium chloride 0.45% with potassium chloride 20 mEq/L 1000 milliLiter(s) (60 mL/Hr) IV Continuous <Continuous>  heparin   Injectable 5000 Unit(s) SubCutaneous every 12 hours  latanoprost 0.005% Ophthalmic Solution 1 Drop(s) Both EYES at bedtime  levothyroxine Injectable 25 MICROGram(s) IV Push at bedtime  metoprolol tartrate Injectable 2.5 milliGRAM(s) IV Push every 12 hours  piperacillin/tazobactam IVPB.- 3.375 Gram(s) IV Intermittent once      Allergies    No Known Allergies    Intolerances        SOCIAL HISTORY:  Denies alcohol abuse, drug abuse or tobacco usage.     FAMILY HISTORY:      VITALS:  T(C): 36.4 (24 @ 10:59), Max: 36.5 (24 @ 04:11)  HR: 97 (24 @ 10:59) (97 - 98)  BP: 106/71 (24 @ 10:59) (105/69 - 106/71)  RR: 26 (24 @ 10:59) (18 - 26)  SpO2: 100% (24 @ 10:59) (95% - 100%)    REVIEW OF SYSTEMS:  unable     PHYSICAL EXAM:  Constitutional: cachetic   HEENT: EOMI  Neck:  No JVD, supple   Respiratory: course   Cardiovascular: S1 and S2, RRR  Gastrointestinal: + BS, soft, NT, ND  Extremities: No peripheral edema, + peripheral pulses  Neurological:   CN2-12 intact  Psychiatric: Normal mood, normal affect  : No Denny  Skin: No rashes, C/D/I  Access: Not applicable    I and O's:    03-10 @ 07:  -   @ 07:00  --------------------------------------------------------  IN: 1000 mL / OUT: 0 mL / NET: 1000 mL     @ 07:  -   @ 14:49  --------------------------------------------------------  IN: 0 mL / OUT: 75 mL / NET: -75 mL          LABS:                        10.1   15.88 )-----------( 369      ( 11 Mar 2024 09:55 )             32.7     11    152<H>  |  121<H>  |  29<H>  ----------------------------<  161<H>  5.0   |  17<L>  |  0.98    Ca    8.2<L>      11 Mar 2024 09:55  Phos  3.6       Mg     1.8     03-10        URINE:  Urinalysis Basic - ( 11 Mar 2024 13:42 )    Color: Dark Yellow / Appearance: Turbid / S.021 / pH: x  Gluc: x / Ketone: Negative mg/dL  / Bili: Negative / Urobili: 0.2 mg/dL   Blood: x / Protein: 30 mg/dL / Nitrite: Positive   Leuk Esterase: Small / RBC: 161 /HPF /  /HPF   Sq Epi: x / Non Sq Epi: 3 /HPF / Bacteria: Negative /HPF        RADIOLOGY & ADDITIONAL STUDIES:    < from: Xray Chest 1 View- PORTABLE-Urgent (Xray Chest 1 View- PORTABLE-Urgent .) (24 @ 12:51) >    ACC: [id number] EXAM:  XR CHEST PORTABLE URGENT 1V   ORDERED BY: AMY AG     PROCEDURE DATE:  2024          INTERPRETATION:  EXAMINATION: XR CHEST URGENT    CLINICAL INDICATION: SOB    TECHNIQUE: Single frontal, portable view of the chest was obtained.    COMPARISON: Chest x-ray 3/8/2024.    FINDINGS:  The Left chest wall pacemaker. Ventricular lead out of field of view.  The heart is normal in size.  New bilateral mid and lower lung field airspace opacities. Small   bilateral effusions.  There is no pneumothorax.  No acute bony abnormality.    IMPRESSION:  New bilateral mid and lower lung field airspace opacities. Small   bilateral effusions.    --- End of Report ---           KYLE HEALY MD; Resident Radiologist  This document has been electronically signed.  CHRISTA SNYDER MD; Attending Radiologist  This document has been electronically signed. Mar 11 2024  4:35PM    < end of copied text >

## 2024-03-11 NOTE — PROGRESS NOTE ADULT - PROVIDER SPECIALTY LIST ADULT
Cardiology
Cardiology
Internal Medicine
Neurology
Cardiology
Family Medicine
Neurology
Family Medicine

## 2024-03-11 NOTE — CONSULT NOTE ADULT - ASSESSMENT
89 yo F  w/ PMHx of malnutrition, HTN, pacemaker, hypothyroidism, and mild cognitive impairment presents with failure to thrive   Hypernatremia and likely dehydrated   CKD stage 3a based on age   PNA-This could be aspiration PNA     1 Renal- Change IVF to Dextrose at the same rate;  No need for renal sono   2 ID-IV abx ;  ?speech and swallow eval.  Clearly she is not eating well   3 Endo-IV synthroid       Sayed [facility]   [phone]  89 yo F  w/ PMHx of malnutrition, HTN, pacemaker, hypothyroidism, and mild cognitive impairment presents with failure to thrive   Hypernatremia and likely dehydrated   CKD stage 3a based on age   PNA-This could be aspiration PNA   Possible metabolic acidosis       1 Renal- Change IVF to Dextrose at the same rate and take the potassium out as well-This IVF will not lead to CHF;  No need for renal sono   2 ID-IV abx ;  ?speech and swallow eval.  Clearly she is not eating well;  Any role for CT of the chest?  3 Endo-IV synthroid       Sayed [facility]   [phone]

## 2024-03-11 NOTE — BH CONSULTATION LIAISON ASSESSMENT NOTE - NSBHCONSULTRECOMMENDOTHER_PSY_A_CORE FT
No Haldol  Hold Buspar   Remeron 7.5mg p.o. qhs (hold if lethargic/sedated)  Hold Depacon if platelet count falls below 100,000 or if ALT or AST rise twice above baseline

## 2024-03-11 NOTE — CONSULT NOTE ADULT - ASSESSMENT
89-year-old female with hypertension, hyperlipidemia, dementia with recent decompensations, second visit to ED this month.  Presented With ams, now developed Hypoxia, infiltrates and tahcypnea.   - Tachypnea with metabolic acidosis with good compensation, no need for High flow or Bipap, infact given risk of aspiration would avoid bipap with her   - Based on exam and imaging she likely does have aspiration pneumonia.    - agree with zosyn and cultures.   - Patient suctioned at bedside, though she was very unhappy with suctioning.  Can offer nebs and chest vest to assist with secreation mobilization. though would discuss GOC as this seems to make her quite distressed.   - check rectal temp.   - change to D5W for IVF, good oral care.

## 2024-03-11 NOTE — CONSULT NOTE ADULT - SUBJECTIVE AND OBJECTIVE BOX
ID INITIAL CONSULT NOTE     Patient is a 89y old  Female who presents with a chief complaint of ams/  ftt (11 Mar 2024 09:18)      HPI:  89 yo F      w/ PMHx of malnutrition, HTN, pacemaker, hypothyroidism, and mild cognitive impairment        presents for 1 day of mental status changes, decreased p.o. intake, trouble chewing/drinking, and chest congestion.       niece   Rebecca Chilel.  rather  upset,  pt  ha s been to  er,couple  times  ,a s of  late      patient was recently admitted to [facility] for mental status change during which time her pacemaker was interrogated and she had sepsis workup.       pt  recently was found to have the flu.  Patient was at baseline (3/2) - A&Ox3 and able to hold conversations.      presents from [facility]     (08 Mar 2024 14:35)      prior hospital charts reviewed [ X ]  primary team notes reviewed [ X ]  other consultant notes reviewed [ X ]    PAST MEDICAL & SURGICAL HISTORY:  Hypertension      Mild cognitive impairment      H/O thyroid disease      Dementia      Hard of hearing          Allergies  No Known Allergies        ANTIMICROBIALS:  cefTRIAXone   IVPB 1000 every 24 hours      Current Abx:     Past Abx:       OTHER MEDS: MEDICATIONS  (STANDING):  heparin   Injectable 5000 every 12 hours  levothyroxine Injectable 25 at bedtime  metoprolol tartrate Injectable 2.5 every 12 hours      SOCIAL HISTORY: [ ] etoh [ ] tobacco [ ] former smoker [ ] IVDU  No known smoking history   AAO x 3 at baseline     FAMILY HISTORY:      REVIEW OF SYSTEMS:  Unable to obtain     Vital Signs Last 24 Hrs  T(F): 97.6 (03-11-24 @ 10:59), Max: 99 (03-09-24 @ 16:11)    Vital Signs Last 24 Hrs  HR: 97 (03-11-24 @ 10:59) (97 - 98)  BP: 106/71 (03-11-24 @ 10:59) (105/69 - 106/71)  RR: 26 (03-11-24 @ 10:59)  SpO2: 100% (03-11-24 @ 10:59) (95% - 100%)  Wt(kg): --    PHYSICAL EXAM:  GENERAL: Mild distress  HEAD:  Atraumatic, Normocephalic  EYES: EOMI, conjunctiva and sclera clear  CHEST/LUNG: On NC, increased respiratory rate, in mild distress, scattered anterior crackles   HEART: tachy   ABDOMEN: Soft, Nontender, Nondistended;   EXTREMITIES:  2+ Peripheral Pulses, No clubbing, cyanosis, or edema. RUE PIV well appearing  PSYCH: AAOx0      Labs:                          10.1   15.88 )-----------( 369      ( 11 Mar 2024 09:55 )             32.7       03-11    152<H>  |  121<H>  |  29<H>  ----------------------------<  161<H>  5.0   |  17<L>  |  0.98    Ca    8.2<L>      11 Mar 2024 09:55  Phos  3.6     03-11  Mg     1.8     03-10        Urinalysis Basic - ( 11 Mar 2024 09:55 )    Color: x / Appearance: x / SG: x / pH: x  Gluc: 161 mg/dL / Ketone: x  / Bili: x / Urobili: x   Blood: x / Protein: x / Nitrite: x   Leuk Esterase: x / RBC: x / WBC x   Sq Epi: x / Non Sq Epi: x / Bacteria: x          MICROBIOLOGY:  .Blood Blood-Peripheral  03-08-24   No growth at 48 Hours  --  --      Clean Catch Clean Catch (Midstream)  02-26-24   No growth  --  --          RADIOLOGY:  CXR 3/11   On my independent assessment, possible R sided opacity

## 2024-03-11 NOTE — BH CONSULTATION LIAISON ASSESSMENT NOTE - NSBHCHARTREVIEWLAB_PSY_A_CORE FT
CBC Full  -  ( 11 Mar 2024 09:55 )  WBC Count : 15.88 K/uL  Hemoglobin : 10.1 g/dL  Hematocrit : 32.7 %  Platelet Count - Automated : 369 K/uL  Mean Cell Volume : 90.6 fl  Mean Cell Hemoglobin : 28.0 pg  Mean Cell Hemoglobin Concentration : 30.9 gm/dL  Auto Neutrophil # : x  Auto Lymphocyte # : x  Auto Monocyte # : x  Auto Eosinophil # : x  Auto Basophil # : x  Auto Neutrophil % : x  Auto Lymphocyte % : x  Auto Monocyte % : x  Auto Eosinophil % : x  Auto Basophil % : x    03-11    152<H>  |  121<H>  |  29<H>  ----------------------------<  161<H>  5.0   |  17<L>  |  0.98    Ca    8.2<L>      11 Mar 2024 09:55  Phos  3.6     03-11  Mg     1.8     03-10  Alanine Aminotransferase (ALT/SGPT): 10 U/L (03.08.24 @ 12:41) Aspartate Aminotransferase (AST/SGOT): 23 U/L (03.08.24 @ 12:41) < from: CT Head No Cont (03.10.24 @ 18:24) >      IMPRESSION:  No evidence of acute intracranial pathology.  Moderate generalized cerebral.  Moderate chronic microvascular ischemic disease.    < end of copied text >  Thyroid Stimulating Hormone, Serum: 1.62 uIU/mL (03.09.24 @ 11:03)

## 2024-03-11 NOTE — BH CONSULTATION LIAISON ASSESSMENT NOTE - NSBHCHARTREVIEWVS_PSY_A_CORE FT
Vital Signs Last 24 Hrs  T(C): 36.4 (11 Mar 2024 10:59), Max: 36.5 (11 Mar 2024 04:11)  T(F): 97.6 (11 Mar 2024 10:59), Max: 97.7 (11 Mar 2024 04:11)  HR: 97 (11 Mar 2024 10:59) (97 - 98)  BP: 106/71 (11 Mar 2024 10:59) (105/69 - 106/71)  BP(mean): --  RR: 26 (11 Mar 2024 10:59) (18 - 26)  SpO2: 100% (11 Mar 2024 10:59) (95% - 100%)    Parameters below as of 11 Mar 2024 10:59  Patient On (Oxygen Delivery Method): nasal cannula  O2 Flow (L/min): 2

## 2024-03-11 NOTE — PROGRESS NOTE ADULT - SUBJECTIVE AND OBJECTIVE BOX
Neurology     S; patient seen and examined. doing okay. no neuro changes       Medications: MEDICATIONS  (STANDING):  cefTRIAXone   IVPB 1000 milliGRAM(s) IV Intermittent every 24 hours  dextrose 5% + sodium chloride 0.45% with potassium chloride 20 mEq/L 1000 milliLiter(s) (60 mL/Hr) IV Continuous <Continuous>  heparin   Injectable 5000 Unit(s) SubCutaneous every 12 hours  latanoprost 0.005% Ophthalmic Solution 1 Drop(s) Both EYES at bedtime  levothyroxine Injectable 25 MICROGram(s) IV Push at bedtime  metoprolol tartrate Injectable 2.5 milliGRAM(s) IV Push every 12 hours    MEDICATIONS  (PRN):       Vitals:  Vital Signs Last 24 Hrs  T(C): 36.5 (11 Mar 2024 04:11), Max: 36.7 (10 Mar 2024 11:24)  T(F): 97.7 (11 Mar 2024 04:11), Max: 98.1 (10 Mar 2024 11:24)  HR: 98 (11 Mar 2024 04:11) (98 - 101)  BP: 105/69 (11 Mar 2024 04:11) (105/69 - 111/75)  BP(mean): --  RR: 18 (11 Mar 2024 04:11) (18 - 18)  SpO2: 95% (11 Mar 2024 04:11) (95% - 95%)    Parameters below as of 11 Mar 2024 04:11    O2 Flow (L/min): 2               General Exam:   General Appearance: Appropriately dressed and in no acute distress       Head: Normocephalic, atraumatic and no dysmorphic features  Ear, Nose, and Throat: Moist mucous membranes  CVS: S1S2+  Resp: No SOB, no wheeze or rhonchi  GI: soft NT/ND  Extremities: No edema or cyanosis  Skin: No bruises or rashes     Neurological Exam:  Mental Status: Awake, alert and oriented x 1.  Able to follow simple verbal commands. Able to name and repeat. fluent speech. No obvious aphasia + mild dysarthria noted.   Cranial Nerves: PERRL, EOMI, VFFC, sensation V1-V3 intact,  no obvious facial asymmetry, equal elevation of palate, scm/trap 5/5, tongue is midline on protrusion. no obvious papilledema on fundoscopic exam.[place]   Motor: generalized weakness. SELLERS at least 4/5    Sensation: Intact to light touch and pinprick throughout. no right/left confusion. no extinction to tactile on DSS.    Reflexes: 1+ throughout at biceps, brachioradialis, triceps, patellars and ankles bilaterally and equal. No clonus. R toe and L toe were both downgoing.  Coordination: No dysmetria on FNF or HKS  Gait: deferred     Data/Labs/Imaging which I personally reviewed.     Labs:       LABS:                          10.1   15.88 )-----------( 369      ( 11 Mar 2024 09:55 )             32.7     03-10    148<H>  |  115<H>  |  24<H>  ----------------------------<  146<H>  2.8<LL>   |  22  |  0.83    Ca    8.7      10 Mar 2024 07:21  Phos  1.7     03-10  Mg     1.8     03-10          Urinalysis Basic - ( 10 Mar 2024 07:21 )    Color: x / Appearance: x / SG: x / pH: x  Gluc: 146 mg/dL / Ketone: x  / Bili: x / Urobili: x   Blood: x / Protein: x / Nitrite: x   Leuk Esterase: x / RBC: x / WBC x   Sq Epi: x / Non Sq Epi: x / Bacteria: x         < from: CT Head No Cont (03.08.24 @ 15:53) >    ACC: [id number] EXAM:  CT BRAIN   ORDERED BY:  MARY SUAREZ     PROCEDURE DATE:  03/08/2024          INTERPRETATION:  Clinical indication: Altered mental status    Multiple axial sections were performed from the base skull to vertex   without contrast enhancement. Coronal and sagittal reconstructions were   performed    This exam compared prior head CT performed September 13, 2023.    Extensive parenchymal volume loss and chronic microvessel ischemic   changes again seen and unchanged    There isno acute hemorrhage mass or mass effect seen    Evaluation of the osseous structures with the appropriate window appears   unremarkable.    The visualized paranasal sinuses mastoid and middle ear regions appear   clear.    IMPRESSION: Stable exam.    --- End of Report ---        < from: CT Head No Cont (03.10.24 @ 18:24) >    ACC: [id number] EXAM:  CT BRAIN   ORDERED BY: AMY AG     PROCEDURE DATE:  03/10/2024          INTERPRETATION:  CLINICAL INFORMATION: Altered mental status.    COMPARISON STUDY: 03/08/2024.    TECHNIQUE:  Noncontrast axial CT images were acquired through the head.  Sagittal and coronal reformats were performed.    FINDINGS:  The examination was repeated due to head motion.  Initial images are   moderately motion degraded and repeat images are mildly motion degraded.    There is no CT evidence of acute intracranial hemorrhage, mass effect or   midline shift.  There is no acute loss of gray matter-white matter   differentiation.    There is moderate generalized cerebral volume loss and moderate   periventricular white matter hypoattenuation is compatible with chronic   microvascular ischemic disease.    The paranasal sinuses and mastoids are grossly clear.  There is a   moderate amount of cerumen in the left external auditory canal.    The calvarium and skull base appear within normal limits.    IMPRESSION:  No evidence of acute intracranial pathology.  Moderate generalized cerebral.  Moderate chronic microvascular ischemic disease.    --- End of Report ---            LIZETH HOBSON MD; Attending Radiologist  This document has been electronically signed. Mar 10 2024  6:55PM    < end of copied text >  
      ---___---___---___---___---___---___ ---___---___---___---___---___---___---___---___---                  M E D I C A L   A T T E N D I N G   P R O G R E S S   N O T E  ---___---___---___---___---___---___ ---___---___---___---___---___---___---___---___---        ================================================    ++CHIEF COMPLAINT:   Patient is a 89y old  Female who presents with a chief complaint of ams/  ftt (10 Mar 2024 12:49)      Altered mental status      ---___---___---___---___---___---  PAST MEDICAL / Surgical  HISTORY:  PAST MEDICAL & SURGICAL HISTORY:  Hypertension      Mild cognitive impairment      H/O thyroid disease      Dementia      Hard of hearing          ---___---___---___---___---___---  FAMILY HISTORY:   FAMILY HISTORY:        ---___---___---___---___---___---  ALLERGIES:   Allergies    No Known Allergies    Intolerances        ---___---___---___---___---___---  MEDICATIONS:  MEDICATIONS  (STANDING):  cefTRIAXone   IVPB 1000 milliGRAM(s) IV Intermittent every 24 hours  dextrose 5% + sodium chloride 0.45% with potassium chloride 20 mEq/L 1000 milliLiter(s) (60 mL/Hr) IV Continuous <Continuous>  heparin   Injectable 5000 Unit(s) SubCutaneous every 12 hours  latanoprost 0.005% Ophthalmic Solution 1 Drop(s) Both EYES at bedtime  levothyroxine Injectable 25 MICROGram(s) IV Push at bedtime  metoprolol tartrate Injectable 2.5 milliGRAM(s) IV Push every 12 hours    MEDICATIONS  (PRN):      ---___---___---___---___---___---  REVIEW OF SYSTEM:  unable to obtain  ---___---___---___---___---___---  VITAL SIGNS:  89y , CAPILLARY BLOOD GLUCOSE      POCT Blood Glucose.: 132 mg/dL (10 Mar 2024 17:00)    T(C): 36.7 (03-10-24 @ 11:24), Max: 37.2 (03-09-24 @ 20:16)  HR: 101 (03-10-24 @ 11:24) (99 - 115)  BP: 111/75 (03-10-24 @ 11:24) (108/69 - 115/61)  RR: 18 (03-10-24 @ 11:24) (17 - 18)  SpO2: 95% (03-10-24 @ 11:24) (93% - 98%)  ---___---___---___---___---___---  PHYSICAL EXAM:    GEN: A&O X 1 , NAD , comfortable  HEENT: NCAT, PERRL, MMM, hearing intact  Neck: supple , no JVD  CVS: S1S2 , regular , No M/R/G appreciated  PULM: CTA B/L,  no W/R/R appreciated  ABD.: soft. non tender, non distended,  bowel sounds present  Extrem: intact pulses , no edema   Derm: No rash , no ecchymoses  PSYCH : normal mood,  no delusion not anxious     ---___---___---___---___---___---            LAB AND IMAGING:                          10.2   12.35 )-----------( 400      ( 10 Mar 2024 07:20 )             32.5               03-10    148<H>  |  115<H>  |  24<H>  ----------------------------<  146<H>  2.8<LL>   |  22  |  0.83    Ca    8.7      10 Mar 2024 07:21  Phos  1.7     03-10  Mg     1.8     03-10                              Urinalysis Basic - ( 10 Mar 2024 07:21 )    Color: x / Appearance: x / SG: x / pH: x  Gluc: 146 mg/dL / Ketone: x  / Bili: x / Urobili: x   Blood: x / Protein: x / Nitrite: x   Leuk Esterase: x / RBC: x / WBC x   Sq Epi: x / Non Sq Epi: x / Bacteria: x        [All pertinent / recent Imaging reviewed]         ---___---___---___---___---___---___ ---___---___---___---___---                         A S S E S S M E N T   A N D   P L A N :      HEALTH ISSUES - PROBLEM Dx:  dysphagia  family does not want ngt   will reassess speech and swallow after ct head possible repeat speech and swallow     hypothyroid continue meds   psychiatry consult to asses ams from possible haldol use       ___________________________  Thank you,  Liborio Guerra  [phone]
Date of Service: 03-09-24 @ 08:40           CARDIOLOGY     PROGRESS  NOTE   ________________________________________________    CHIEF COMPLAINT:Patient is a 89y old  Female who presents with a chief complaint of ams/  ftt (08 Mar 2024 18:48)  no complain, sleeping, still tachycardic  	  REVIEW OF SYSTEMS:  CONSTITUTIONAL: No fever, weight loss, or fatigue  EYES: No eye pain, visual disturbances, or discharge  ENT:  No difficulty hearing, tinnitus, vertigo; No sinus or throat pain  NECK: No pain or stiffness  RESPIRATORY: No cough, wheezing, chills or hemoptysis; No Shortness of Breath  CARDIOVASCULAR: No chest pain, palpitations, passing out, dizziness, or leg swelling  GASTROINTESTINAL: No abdominal or epigastric pain. No nausea, vomiting, or hematemesis; No diarrhea or constipation. No melena or hematochezia.  GENITOURINARY: No dysuria, frequency, hematuria, or incontinence  NEUROLOGICAL: No headaches, memory loss, loss of strength, numbness, or tremors  SKIN: No itching, burning, rashes, or lesions   LYMPH Nodes: No enlarged glands  ENDOCRINE: No heat or cold intolerance; No hair loss  MUSCULOSKELETAL: No joint pain or swelling; No muscle, back, or extremity pain  PSYCHIATRIC: No depression, anxiety, mood swings, or difficulty sleeping  HEME/LYMPH: No easy bruising, or bleeding gums  ALLERGY AND IMMUNOLOGIC: No hives or eczema	    [x ] All others negative	  [ ] Unable to obtain    PHYSICAL EXAM:  T(C): 36.4 (03-09-24 @ 04:56), Max: 37.1 (03-09-24 @ 00:31)  HR: 106 (03-09-24 @ 04:56) (71 - 106)  BP: 134/78 (03-09-24 @ 04:56) (101/68 - 134/78)  RR: 18 (03-09-24 @ 04:56) (18 - 22)  SpO2: 96% (03-09-24 @ 04:56) (95% - 96%)  Wt(kg): --  I&O's Summary    08 Mar 2024 07:01  -  09 Mar 2024 07:00  --------------------------------------------------------  IN: 0 mL / OUT: 100 mL / NET: -100 mL        Appearance: Normal	  HEENT:   Normal oral mucosa, PERRL, EOMI	  Lymphatic: No lymphadenopathy  Cardiovascular: Normal S1 S2, No JVD, + murmurs, No edema  Respiratory: rhonchi  Psychiatry: A & O x 3, Mood & affect appropriate  Gastrointestinal:  Soft, Non-tender, + BS	  Skin: No rashes, No ecchymoses, No cyanosis	  Extremities: Normal range of motion, No clubbing, cyanosis or edema  Vascular: Peripheral pulses palpable 2+ bilaterally    MEDICATIONS  (STANDING):  cefTRIAXone   IVPB 1000 milliGRAM(s) IV Intermittent every 24 hours  dextrose 5% + sodium chloride 0.9%. 1000 milliLiter(s) (60 mL/Hr) IV Continuous <Continuous>  heparin   Injectable 5000 Unit(s) SubCutaneous every 12 hours  latanoprost 0.005% Ophthalmic Solution 1 Drop(s) Both EYES at bedtime  levothyroxine Injectable 25 MICROGram(s) IV Push at bedtime      TELEMETRY: 	    ECG:  	  RADIOLOGY:  OTHER: 	  	  LABS:	 	    CARDIAC MARKERS:                          11.1   12.62 )-----------( 317      ( 08 Mar 2024 12:41 )             34.1     03-08    141  |  107  |  36<H>  ----------------------------<  89  4.1   |  16<L>  |  0.76    Ca    9.1      08 Mar 2024 16:28    TPro  7.7  /  Alb  3.3  /  TBili  0.5  /  DBili  x   /  AST  23  /  ALT  10  /  AlkPhos  80  03-08    proBNP:   Lipid Profile:   HgA1c:   TSH: Thyroid Stimulating Hormone, Serum: 2.48 uIU/mL (02-28 @ 06:51)    PT/INR - ( 08 Mar 2024 12:41 )   PT: 13.5 sec;   INR: 1.30 ratio         PTT - ( 08 Mar 2024 12:41 )  PTT:34.1 sec      Assessment and plan  ---------------------------  89 yo F w/ PMHx of malnutrition, HTN, pacemaker, hypothyroidism, and mild cognitive impairment presents for 1 day of mental status changes, decreased p.o. intake, trouble chewing/drinking, and chest congestion. After speaking with niece (Rebecca Chilel), patient was recently admitted to [facility] for mental status change during which time her pacemaker was interrogated and she had sepsis workup.  She recently was found to have the flu.  Patient was at baseline (3/2)    and able to hold conversations.  HPI limited due to limited paperwork and patient's altered mental status. [facility] presents [facility] limited paperwork.  pt is well known to me with sig cardiac hx with increase trop  increase trop ?demand ischemia  asa daily  ppm recently has been checked  check tsh  dvt prophylaxis  ?metabolic encephalopathy, may add sodium bicarb, check ABG  ?need of peg placement  tachycardic, check LE doppler  pt on betra blocker before will start on in lopressor bid    	        
Date of Service: 03-10-24 @ 10:04           CARDIOLOGY     PROGRESS  NOTE   ________________________________________________    CHIEF COMPLAINT:Patient is a 89y old  Female who presents with a chief complaint of Altered mental status  comfortable    	  REVIEW OF SYSTEMS:  CONSTITUTIONAL: No fever, weight loss, or fatigue  EYES: No eye pain, visual disturbances, or discharge  ENT:  No difficulty hearing, tinnitus, vertigo; No sinus or throat pain  NECK: No pain or stiffness  RESPIRATORY: No cough, wheezing, chills or hemoptysis; No Shortness of Breath  CARDIOVASCULAR: No chest pain, palpitations, passing out, dizziness, or leg swelling  GASTROINTESTINAL: No abdominal or epigastric pain. No nausea, vomiting, or hematemesis; No diarrhea or constipation. No melena or hematochezia.  GENITOURINARY: No dysuria, frequency, hematuria, or incontinence  NEUROLOGICAL: No headaches, memory loss, loss of strength, numbness, or tremors  SKIN: No itching, burning, rashes, or lesions   LYMPH Nodes: No enlarged glands  ENDOCRINE: No heat or cold intolerance; No hair loss  MUSCULOSKELETAL: No joint pain or swelling; No muscle, back, or extremity pain  PSYCHIATRIC: No depression, anxiety, mood swings, or difficulty sleeping  HEME/LYMPH: No easy bruising, or bleeding gums  ALLERGY AND IMMUNOLOGIC: No hives or eczema	    [ ] All others negative	  [x ] Unable to obtain    PHYSICAL EXAM:  T(C): 36.7 (03-10-24 @ 05:00), Max: 37.2 (03-09-24 @ 16:11)  HR: 99 (03-10-24 @ 05:00) (99 - 115)  BP: 115/61 (03-10-24 @ 05:00) (100/74 - 115/61)  RR: 17 (03-10-24 @ 05:00) (16 - 19)  SpO2: 96% (03-10-24 @ 05:00) (93% - 98%)  Wt(kg): --  I&O's Summary      Appearance: Normal	  HEENT:   Normal oral mucosa, PERRL, EOMI	  Lymphatic: No lymphadenopathy  Cardiovascular: Normal S1 S2, No JVD, + murmurs, No edema  Respiratory: rhonchi  Psychiatry: dfementia  Gastrointestinal:  Soft, Non-tender, + BS	  Skin: No rashes, No ecchymoses, No cyanosis	  Extremities: Normal range of motion, No clubbing, cyanosis or edema  Vascular: Peripheral pulses palpable 2+ bilaterally    MEDICATIONS  (STANDING):  cefTRIAXone   IVPB 1000 milliGRAM(s) IV Intermittent every 24 hours  dextrose 5% + sodium chloride 0.9%. 1000 milliLiter(s) (60 mL/Hr) IV Continuous <Continuous>  heparin   Injectable 5000 Unit(s) SubCutaneous every 12 hours  latanoprost 0.005% Ophthalmic Solution 1 Drop(s) Both EYES at bedtime  levothyroxine Injectable 25 MICROGram(s) IV Push at bedtime  metoprolol tartrate Injectable 2.5 milliGRAM(s) IV Push every 12 hours  potassium chloride    Tablet ER 40 milliEquivalent(s) Oral every 4 hours  potassium phosphate IVPB 30 milliMole(s) IV Intermittent once      TELEMETRY: 	    ECG:  	  RADIOLOGY:  OTHER: 	  	  LABS:	 	    CARDIAC MARKERS:                            10.2   12.35 )-----------( 400      ( 10 Mar 2024 07:20 )             32.5     03-10    148<H>  |  115<H>  |  24<H>  ----------------------------<  146<H>  2.8<LL>   |  22  |  0.83    Ca    8.7      10 Mar 2024 07:21  Phos  1.7     03-10  Mg     1.8     03-10    TPro  7.7  /  Alb  3.3  /  TBili  0.5  /  DBili  x   /  AST  23  /  ALT  10  /  AlkPhos  80  03-08    proBNP:   Lipid Profile:   HgA1c:   TSH: Thyroid Stimulating Hormone, Serum: 1.62 uIU/mL (03-09 @ 11:03)  Thyroid Stimulating Hormone, Serum: 2.48 uIU/mL (02-28 @ 06:51)    PT/INR - ( 08 Mar 2024 12:41 )   PT: 13.5 sec;   INR: 1.30 ratio         PTT - ( 08 Mar 2024 12:41 )  PTT:34.1 sec      Assessment and plan  ---------------------------  89 yo F w/ PMHx of malnutrition, HTN, pacemaker, hypothyroidism, and mild cognitive impairment presents for 1 day of mental status changes, decreased p.o. intake, trouble chewing/drinking, and chest congestion. After speaking with niece (Rebecca Chilel), patient was recently admitted to [facility] for mental status change during which time her pacemaker was interrogated and she had sepsis workup.  She recently was found to have the flu.  Patient was at baseline (3/2)    and able to hold conversations.  HPI limited due to limited paperwork and patient's altered mental status. She presents from [facility] limited paperwork.  pt is well known to me with sig cardiac hx with increase trop  increase trop ?demand ischemia  asa daily  ppm recently has been checked  check tsh  dvt prophylaxis  ?metabolic encephalopathy, may add sodium bicarb, check ABG  ?need of peg placement  tachycardic, check LE doppler  pt on beta blocker before will start on in lopressor bid  change ivf/ replete lytes  will discuss with family NGT feeding possible PEG placement  GOC    	        
Date of Service: 03-11-24 @ 07:30           CARDIOLOGY     PROGRESS  NOTE   ________________________________________________    CHIEF COMPLAINT:Patient is a 89y old  Female who presents with a chief complaint of ams/  ftt (10 Mar 2024 18:02)  comfortable  	  REVIEW OF SYSTEMS:  CONSTITUTIONAL: No fever, weight loss, or fatigue  EYES: No eye pain, visual disturbances, or discharge  ENT:  No difficulty hearing, tinnitus, vertigo; No sinus or throat pain  NECK: No pain or stiffness  RESPIRATORY: No cough, wheezing, chills or hemoptysis; No Shortness of Breath  CARDIOVASCULAR: No chest pain, palpitations, passing out, dizziness, or leg swelling  GASTROINTESTINAL: No abdominal or epigastric pain. No nausea, vomiting, or hematemesis; No diarrhea or constipation. No melena or hematochezia.  GENITOURINARY: No dysuria, frequency, hematuria, or incontinence  NEUROLOGICAL: No headaches, memory loss, loss of strength, numbness, or tremors  SKIN: No itching, burning, rashes, or lesions   LYMPH Nodes: No enlarged glands  ENDOCRINE: No heat or cold intolerance; No hair loss  MUSCULOSKELETAL: No joint pain or swelling; No muscle, back, or extremity pain  PSYCHIATRIC: No depression, anxiety, mood swings, or difficulty sleeping  HEME/LYMPH: No easy bruising, or bleeding gums  ALLERGY AND IMMUNOLOGIC: No hives or eczema	    [ ] All others negative	  [x ] Unable to obtain    PHYSICAL EXAM:  T(C): 36.5 (03-11-24 @ 04:11), Max: 36.7 (03-10-24 @ 11:24)  HR: 98 (03-11-24 @ 04:11) (98 - 101)  BP: 105/69 (03-11-24 @ 04:11) (105/69 - 111/75)  RR: 18 (03-11-24 @ 04:11) (18 - 18)  SpO2: 95% (03-11-24 @ 04:11) (95% - 95%)  Wt(kg): --  I&O's Summary    10 Mar 2024 07:01  -  11 Mar 2024 07:00  --------------------------------------------------------  IN: 1000 mL / OUT: 0 mL / NET: 1000 mL        Appearance: Normal	  HEENT:   Normal oral mucosa, PERRL, EOMI	  Lymphatic: No lymphadenopathy  Cardiovascular: Normal S1 S2, No JVD, + murmurs, No edema  Respiratory:rhonchi  Psychiatry: A & O x 3, Mood & affect appropriate  Gastrointestinal:  Soft, Non-tender, + BS	  Skin: No rashes, No ecchymoses, No cyanosis	    Extremities: Normal range of motion, No clubbing, cyanosis or edema  Vascular: Peripheral pulses palpable 2+ bilaterally    MEDICATIONS  (STANDING):  cefTRIAXone   IVPB 1000 milliGRAM(s) IV Intermittent every 24 hours  dextrose 5% + sodium chloride 0.45% with potassium chloride 20 mEq/L 1000 milliLiter(s) (60 mL/Hr) IV Continuous <Continuous>  heparin   Injectable 5000 Unit(s) SubCutaneous every 12 hours  latanoprost 0.005% Ophthalmic Solution 1 Drop(s) Both EYES at bedtime  levothyroxine Injectable 25 MICROGram(s) IV Push at bedtime  metoprolol tartrate Injectable 2.5 milliGRAM(s) IV Push every 12 hours      TELEMETRY: 	    ECG:  	  RADIOLOGY:  OTHER: 	  	  LABS:	 	    CARDIAC MARKERS:                                10.2   12.35 )-----------( 400      ( 10 Mar 2024 07:20 )             32.5     03-10    148<H>  |  115<H>  |  24<H>  ----------------------------<  146<H>  2.8<LL>   |  22  |  0.83    Ca    8.7      10 Mar 2024 07:21  Phos  1.7     03-10  Mg     1.8     03-10      proBNP:   Lipid Profile:   HgA1c:   TSH: Thyroid Stimulating Hormone, Serum: 1.62 uIU/mL (03-09 @ 11:03)  Thyroid Stimulating Hormone, Serum: 2.48 uIU/mL (02-28 @ 06:51)    Culture - Blood (03.08.24 @ 12:34)    Specimen Source: .Blood Blood-Peripheral   Culture Results:   No growth at 48 Hours    < from: Xray Chest 1 View- PORTABLE-Urgent (03.08.24 @ 14:27) >  The lungs are clear. No pleural effusion. No pneumothorax.  The heart size is normal. Left chest wall dual-lead pacemaker.  No acute osseous abnormalities.    IMPRESSION:  Clear lungs.        Assessment and plan  ---------------------------  89 yo F w/ PMHx of malnutrition, HTN, pacemaker, hypothyroidism, and mild cognitive impairment presents for 1 day of mental status changes, decreased p.o. intake, trouble chewing/drinking, and chest congestion. After speaking with niece (Rebecca Chilel), patient was recently admitted to [facility] for mental status change during which time her pacemaker was interrogated and she had sepsis workup.  She recently was found to have the flu.  Patient was at baseline (3/2)    and able to hold conversations.  HPI limited due to limited paperwork and patient's altered mental status. She presents from [facility] limited paperwork.  pt is well known to me with sig cardiac hx with increase trop  increase trop ?demand ischemia  asa daily  ppm recently has been checked  check tsh  dvt prophylaxis  ?metabolic encephalopathy, may add sodium bicarb, check ABG  ?need of peg placement  tachycardic, check LE doppler r/o dvt doubt it  pt on beta blocker before will start on in lopressor bid  change ivf/ replete lytes  will discuss with family NGT feeding possible PEG placement, plan discussed with ACP  awaiting blood work from today ?dc iv abx  GOC    	        
      ---___---___---___---___---___---___ ---___---___---___---___---___---___---___---___---                  M E D I C A L   A T T E N D I N G   P R O G R E S S   N O T E  ---___---___---___---___---___---___ ---___---___---___---___---___---___---___---___---        ================================================    ++CHIEF COMPLAINT:   Patient is a 89y old  Female who presents with a chief complaint of ams/  ftt (11 Mar 2024 17:00)  xray showed possible aspiration pneumonia   wbc elevated   tachypneic today   pulmonary id and renal consult ordered   as well as psychiatry    ---___---___---___---___---___---  PAST MEDICAL / Surgical  HISTORY:  PAST MEDICAL & SURGICAL HISTORY:  Hypertension      Mild cognitive impairment      H/O thyroid disease      Dementia      Hard of hearing          ---___---___---___---___---___---  FAMILY HISTORY:   FAMILY HISTORY:        ---___---___---___---___---___---  ALLERGIES:   Allergies    No Known Allergies    Intolerances        ---___---___---___---___---___---  MEDICATIONS:  MEDICATIONS  (STANDING):  albuterol/ipratropium for Nebulization 3 milliLiter(s) Nebulizer every 6 hours  dextrose 5%. 1000 milliLiter(s) (70 mL/Hr) IV Continuous <Continuous>  heparin   Injectable 5000 Unit(s) SubCutaneous every 12 hours  latanoprost 0.005% Ophthalmic Solution 1 Drop(s) Both EYES at bedtime  levothyroxine Injectable 25 MICROGram(s) IV Push at bedtime  metoprolol tartrate Injectable 2.5 milliGRAM(s) IV Push every 12 hours  mirtazapine 7.5 milliGRAM(s) Oral at bedtime    MEDICATIONS  (PRN):      ---___---___---___---___---___---  REVIEW OF SYSTEM:    unable to obtain     ---___---___---___---___---___---  VITAL SIGNS:  89y , CAPILLARY BLOOD GLUCOSE      POCT Blood Glucose.: 129 mg/dL (11 Mar 2024 17:09)    T(C): 36.5 (24 @ 17:10), Max: 36.5 (24 @ 04:11)  HR: 97 (24 @ 10:59) (97 - 98)  BP: 106/71 (24 @ 10:59) (105/69 - 106/71)  RR: 26 (24 @ 10:59) (18 - 26)  SpO2: 100% (24 @ 10:59) (95% - 100%)  ---___---___---___---___---___---  PHYSICAL EXAM:    GEN: A&O X 1 , NAD , comfortable  HEENT: NCAT, PERRL, MMM, hearing intact  Neck: supple , no JVD  CVS: S1S2 , regular , No M/R/G appreciated  PULM: tachypneic   ABD.: soft. non tender, non distended,  bowel sounds present  Extrem: intact pulses , no edema   Derm: No rash , no ecchymoses  PSYCH : normal mood,  no delusion not anxious     ---___---___---___---___---___---            LAB AND IMAGING:                          10.1   15.88 )-----------( 369      ( 11 Mar 2024 09:55 )             32.7               03-11    152<H>  |  121<H>  |  29<H>  ----------------------------<  161<H>  5.0   |  17<L>  |  0.98    Ca    8.2<L>      11 Mar 2024 09:55  Phos  3.6     03-11  Mg     1.8     03-10                            ABG - ( 11 Mar 2024 12:42 )  pH, Arterial: 7.38  pH, Blood: x     /  pCO2: 28    /  pO2: 63    / HCO3: 17    / Base Excess: -7.1  /  SaO2: 93.1              Urinalysis Basic - ( 11 Mar 2024 13:42 )    Color: Dark Yellow / Appearance: Turbid / S.021 / pH: x  Gluc: x / Ketone: Negative mg/dL  / Bili: Negative / Urobili: 0.2 mg/dL   Blood: x / Protein: 30 mg/dL / Nitrite: Positive   Leuk Esterase: Small / RBC: 161 /HPF /  /HPF   Sq Epi: x / Non Sq Epi: 3 /HPF / Bacteria: Negative /HPF        [All pertinent / recent Imaging reviewed]         ---___---___---___---___---___---___ ---___---___---___---___---                         A S S E S S M E N T   A N D   P L A N :      HEALTH ISSUES - PROBLEM Dx:  pneumonia on iv abx changed to zosyn id  consult appreciated   metabolic encephalopathy  likely secondary to pneumonia  and elevated wbc   hypernatremia   renal changed fluids   appreciate psychiatry consult     GI/DVT Prophylaxis.  as ordered  --------------------------------------------  Case discussed with  lizeth over the phone   Education given on   ___________________________  Thank you,  Liborio Guerra  [phone]
  date of service: 24 @ 11:23  afebirle  REVIEW OF SYSTEMS:  CONSTITUTIONAL: No fever,  no  weight loss  ENT:  No  tinnitus,   no   vertigo  NECK: No pain or stiffness  RESPIRATORY: No cough, wheezing, chills or hemoptysis;    No Shortness of Breath  CARDIOVASCULAR: No chest pain, palpitations, dizziness  GASTROINTESTINAL: No abdominal or epigastric pain. No nausea, vomiting, or hematemesis; No diarrhea  No melena or hematochezia.  GENITOURINARY: No dysuria, frequency, hematuria, or incontinence  NEUROLOGICAL: No headaches  SKIN: No itching,  no   rash  LYMPH Nodes: No enlarged glands  ENDOCRINE: No heat or cold intolerance  MUSCULOSKELETAL: No joint pain or swelling  PSYCHIATRIC: No depression, anxiety  HEME/LYMPH: No easy bruising, or bleeding gums  ALLERGY AND IMMUNOLOGIC: No hives or eczema	    MEDICATIONS  (STANDING):  cefTRIAXone   IVPB 1000 milliGRAM(s) IV Intermittent every 24 hours  dextrose 5% + sodium chloride 0.9%. 1000 milliLiter(s) (60 mL/Hr) IV Continuous <Continuous>  heparin   Injectable 5000 Unit(s) SubCutaneous every 12 hours  latanoprost 0.005% Ophthalmic Solution 1 Drop(s) Both EYES at bedtime  levothyroxine Injectable 25 MICROGram(s) IV Push at bedtime  metoprolol tartrate Injectable 2.5 milliGRAM(s) IV Push every 12 hours    MEDICATIONS  (PRN):      Vital Signs Last 24 Hrs  T(C): 36.3 (09 Mar 2024 08:30), Max: 37.1 (09 Mar 2024 00:31)  T(F): 97.3 (09 Mar 2024 08:30), Max: 98.7 (09 Mar 2024 00:31)  HR: 117 (09 Mar 2024 08:30) (71 - 117)  BP: 114/66 (09 Mar 2024 08:30) (101/68 - 134/78)  BP(mean): 72 (08 Mar 2024 16:20) (72 - 72)  RR: 16 (09 Mar 2024 08:30) (16 - 22)  SpO2: 96% (09 Mar 2024 08:30) (95% - 96%)    Parameters below as of 09 Mar 2024 08:30  Patient On (Oxygen Delivery Method): room air      CAPILLARY BLOOD GLUCOSE      POCT Blood Glucose.: 103 mg/dL (09 Mar 2024 05:43)  POCT Blood Glucose.: 128 mg/dL (09 Mar 2024 00:23)  POCT Blood Glucose.: 90 mg/dL (08 Mar 2024 21:24)    I&O's Summary    08 Mar 2024 07:01  -  09 Mar 2024 07:00  --------------------------------------------------------  IN: 0 mL / OUT: 100 mL / NET: -100 mL          Appearance: Normal	  HEENT:   Normal oral mucosa, PERRL, EOMI	  Lymphatic: No lymphadenopathy  Cardiovascular: Normal S1 S2, No JVD  Respiratory: Lungs clear to auscultation	  Gastrointestinal:  Soft, Non-tender, + BS	  Skin: No rash, No ecchymoses	  Extremities:     LABS:                        9.9    10.28 )-----------( 359      ( 09 Mar 2024 11:04 )             31.2     03-08    141  |  107  |  36<H>  ----------------------------<  89  4.1   |  16<L>  |  0.76    Ca    9.1      08 Mar 2024 16:28    TPro  7.7  /  Alb  3.3  /  TBili  0.5  /  DBili  x   /  AST  23  /  ALT  10  /  AlkPhos  80  03-08    PT/INR - ( 08 Mar 2024 12:41 )   PT: 13.5 sec;   INR: 1.30 ratio         PTT - ( 08 Mar 2024 12:41 )  PTT:34.1 sec      Urinalysis Basic - ( 09 Mar 2024 01:15 )    Color: Yellow / Appearance: Clear / S.020 / pH: x  Gluc: x / Ketone: 40 mg/dL  / Bili: Negative / Urobili: 1.0 mg/dL   Blood: x / Protein: 30 mg/dL / Nitrite: Negative   Leuk Esterase: Small / RBC: 3 /HPF / WBC 64 /HPF   Sq Epi: x / Non Sq Epi: 8 /HPF / Bacteria: Negative /HPF          -08 @ 12:34  3.6  45      Thyroid Stimulating Hormone, Serum: 2.48 uIU/mL ( @ 06:51)          Consultant(s) Notes Reviewed:      Care Discussed with Consultants/Other Providers:    
Neurology     S; patient seen and examined. doing okay. no neuro changes       Medications: MEDICATIONS  (STANDING):  cefTRIAXone   IVPB 1000 milliGRAM(s) IV Intermittent every 24 hours  dextrose 5% + sodium chloride 0.45% with potassium chloride 20 mEq/L 1000 milliLiter(s) (60 mL/Hr) IV Continuous <Continuous>  heparin   Injectable 5000 Unit(s) SubCutaneous every 12 hours  latanoprost 0.005% Ophthalmic Solution 1 Drop(s) Both EYES at bedtime  levothyroxine Injectable 25 MICROGram(s) IV Push at bedtime  metoprolol tartrate Injectable 2.5 milliGRAM(s) IV Push every 12 hours  potassium chloride  20 mEq/100 mL IVPB 20 milliEquivalent(s) IV Intermittent every 2 hours    MEDICATIONS  (PRN):       Vitals:  Vital Signs Last 24 Hrs  T(C): 36.7 (10 Mar 2024 11:24), Max: 37.2 (09 Mar 2024 16:11)  T(F): 98.1 (10 Mar 2024 11:24), Max: 99 (09 Mar 2024 16:11)  HR: 101 (10 Mar 2024 11:24) (99 - 115)  BP: 111/75 (10 Mar 2024 11:24) (100/74 - 115/61)  BP(mean): --  RR: 18 (10 Mar 2024 11:24) (17 - 19)  SpO2: 95% (10 Mar 2024 11:24) (93% - 98%)    Parameters below as of 10 Mar 2024 05:00  Patient On (Oxygen Delivery Method): room air            General Exam:   General Appearance: Appropriately dressed and in no acute distress       Head: Normocephalic, atraumatic and no dysmorphic features  Ear, Nose, and Throat: Moist mucous membranes  CVS: S1S2+  Resp: No SOB, no wheeze or rhonchi  GI: soft NT/ND  Extremities: No edema or cyanosis  Skin: No bruises or rashes     Neurological Exam:  Mental Status: Awake, alert and oriented x 1.  Able to follow simple verbal commands. Able to name and repeat. fluent speech. No obvious aphasia + mild dysarthria noted.   Cranial Nerves: PERRL, EOMI, VFFC, sensation V1-V3 intact,  no obvious facial asymmetry, equal elevation of palate, scm/trap 5/5, tongue is midline on protrusion. no obvious papilledema on fundoscopic exam.[facility]   Motor: generalized weakness. SELLERS at least 4/5    Sensation: Intact to light touch and pinprick throughout. no right/left confusion. no extinction to tactile on DSS.    Reflexes: 1+ throughout at biceps, brachioradialis, triceps, patellars and ankles bilaterally and equal. No clonus. R toe and L toe were both downgoing.  Coordination: No dysmetria on FNF or HKS  Gait: deferred     Data/Labs/Imaging which I personally reviewed.     Labs:       LABS:                          10.2   12.35 )-----------( 400      ( 10 Mar 2024 07:20 )             32.5     03-10    148<H>  |  115<H>  |  24<H>  ----------------------------<  146<H>  2.8<LL>   |  22  |  0.83    Ca    8.7      10 Mar 2024 07:21  Phos  1.7     03-10  Mg     1.8     03-10          Urinalysis Basic - ( 10 Mar 2024 07:21 )    Color: x / Appearance: x / SG: x / pH: x  Gluc: 146 mg/dL / Ketone: x  / Bili: x / Urobili: x   Blood: x / Protein: x / Nitrite: x   Leuk Esterase: x / RBC: x / WBC x   Sq Epi: x / Non Sq Epi: x / Bacteria: x            < from: CT Head No Cont (03.08.24 @ 15:53) >    ACC: [id number] EXAM:  CT BRAIN   ORDERED BY:  MARY SUAREZ     PROCEDURE DATE:  03/08/2024          INTERPRETATION:  Clinical indication: Altered mental status    Multiple axial sections were performed from the base skull to vertex   without contrast enhancement. Coronal and sagittal reconstructions were   performed    This exam compared prior head CT performed September 13, 2023.    Extensive parenchymal volume loss and chronic microvessel ischemic   changes again seen and unchanged    There isno acute hemorrhage mass or mass effect seen    Evaluation of the osseous structures with the appropriate window appears   unremarkable.    The visualized paranasal sinuses mastoid and middle ear regions appear   clear.    IMPRESSION: Stable exam.    --- End of Report ---            LIZETH CALDWELL MD; Attending Radiologist  This document has been electronically signed. Mar  8 2024  3:56PM    < end of copied text >

## 2024-03-11 NOTE — PROGRESS NOTE ADULT - ASSESSMENT
89 yo  F with  malnutrition/ FTT, HTN, pacemaker, hypothyroidism, and mild cognitive impairment presents for 1 day of mental status changes, decreased p.o. intake, trouble chewing/drinking, and chest congestion.  recent admission for sepsis. neuro called for dysarthria   9/13/23 CTH chronic changes.   CTA H/N neg 9/13/23   3/8 CTH chronic microvascular changes and extensive volume loss  ; does have evidence of chronic small vessel infarcts on CTH .   repeat CTH 3/10: unchange.dd  moderated volume loss and microvascular chagnes. no stroke   o/e AAOx1, mild dysarthria, SELLERS at least 4/5, cachectic appearing     Impression   1) dysarthria with non focal exam possible related to toxic metabolic encephalopathy; lower suspicion for stroke    2) dementia   3) chronic infarct on CTH , not new.  on CTH from 6/2023     - K 2.8; replete and repeat BMP   - unclear if patient can even have MRI with PPM.  also may not tolerate.  can consider repeat CTH as alternative to r/o stroke if no improvement with treatment of infection but low suspicion; would not  already placing on asa and statin ; repeat CTH unchanged   - b12, RPR, TSH(WNL)    - treatment of infection with CTX.    - would place on ASA 81 if no contraindication and low dose statin atorvastatin 10mg QHS for secondary stroke prevention. chronic ischemic changes on CTH   - PT/OT   - check FS, glucose control <180  - GI/DVT ppx    - GOC with family   Almas Xiao MD  Vascular Neurology  Office: [phone]

## 2024-03-11 NOTE — BH CONSULTATION LIAISON ASSESSMENT NOTE - SUMMARY
89 year old with baseline dementia with behavioral disturbance. Now with a delirium from pneumonia and UTI.   Cannot take neuroleptics due to prolonged QTc interval.

## 2024-03-11 NOTE — BH CONSULTATION LIAISON ASSESSMENT NOTE - CURRENT MEDICATION
MEDICATIONS  (STANDING):  dextrose 5% + sodium chloride 0.45% with potassium chloride 20 mEq/L 1000 milliLiter(s) (60 mL/Hr) IV Continuous <Continuous>  heparin   Injectable 5000 Unit(s) SubCutaneous every 12 hours  latanoprost 0.005% Ophthalmic Solution 1 Drop(s) Both EYES at bedtime  levothyroxine Injectable 25 MICROGram(s) IV Push at bedtime  metoprolol tartrate Injectable 2.5 milliGRAM(s) IV Push every 12 hours  piperacillin/tazobactam IVPB. 3.375 Gram(s) IV Intermittent once  piperacillin/tazobactam IVPB.- 3.375 Gram(s) IV Intermittent once    MEDICATIONS  (PRN):

## 2024-03-11 NOTE — CONSULT NOTE ADULT - SUBJECTIVE AND OBJECTIVE BOX
CHIEF COMPLAINT:    HPI:  89-year-old female with hypertension, hyperlipidemia, dementia with recent decompensations, second visit to ED this month.  Presented With ams, dehydration.  Pulmonary consult placed for developing hypoxia, tachypnea and pulmonary infiltrates.  Unable to obtain ROS from patient. due to dementia though she is verbal and denies all complaints except wanting sleep.     PAST MEDICAL & SURGICAL HISTORY:  Hypertension      Mild cognitive impairment      H/O thyroid disease      Dementia      Hard of hearing          FAMILY HISTORY: No relevent history      SOCIAL HISTORY: Unable to obtain  Allergies    No Known Allergies    Intolerances        HOME MEDICATIONS:  Reviewed in EMR  REVIEW OF SYSTEMS:    [ ] All other systems negative  [x ] Unable to assess ROS because ________Dementia/delirium    OBJECTIVE:  ICU Vital Signs Last 24 Hrs  T(C): 36.4 (11 Mar 2024 10:59), Max: 36.5 (11 Mar 2024 04:11)  T(F): 97.6 (11 Mar 2024 10:59), Max: 97.7 (11 Mar 2024 04:11)  HR: 97 (11 Mar 2024 10:59) (97 - 98)  BP: 106/71 (11 Mar 2024 10:59) (105/69 - 106/71)  BP(mean): --  ABP: --  ABP(mean): --  RR: 26 (11 Mar 2024 10:59) (18 - 26)  SpO2: 100% (11 Mar 2024 10:59) (95% - 100%)    O2 Parameters below as of 11 Mar 2024 10:59  Patient On (Oxygen Delivery Method): nasal cannula  O2 Flow (L/min): 2            03-10 @ : @ 07:00  --------------------------------------------------------  IN: 1000 mL / OUT: 0 mL / NET: 1000 mL     @ 07: @ 17:02  --------------------------------------------------------  IN: 0 mL / OUT: 75 mL / NET: -75 mL      CAPILLARY BLOOD GLUCOSE      POCT Blood Glucose.: 161 mg/dL (11 Mar 2024 12:41)      PHYSICAL EXAM:  General: awake alert, comfortable but tachypnieac.   HEENT: Very dry MM, Caked on oral secretions.   Neck: No JVD  Respiratory: Ronchi through out. Tachypnea, with out distress.   Cardiovascular: S1 S2 RRR  Abdomen: Soft, NT, ND  Extremities: No edema, or rash.  Skin: Dry skin.   Neurological: awake alert and moving all 4 but confused.     HOSPITAL MEDICATIONS:  Standing Meds:  albuterol/ipratropium for Nebulization 3 milliLiter(s) Nebulizer every 6 hours  dextrose 5% + sodium chloride 0.45% with potassium chloride 20 mEq/L 1000 milliLiter(s) IV Continuous <Continuous>  dextrose 5%. 1000 milliLiter(s) IV Continuous <Continuous>  heparin   Injectable 5000 Unit(s) SubCutaneous every 12 hours  latanoprost 0.005% Ophthalmic Solution 1 Drop(s) Both EYES at bedtime  levothyroxine Injectable 25 MICROGram(s) IV Push at bedtime  metoprolol tartrate Injectable 2.5 milliGRAM(s) IV Push every 12 hours  mirtazapine 7.5 milliGRAM(s) Oral at bedtime  piperacillin/tazobactam IVPB.- 3.375 Gram(s) IV Intermittent once      PRN Meds:      LABS:                        10.1   15.88 )-----------( 369      ( 11 Mar 2024 09:55 )             32.7     Hgb Trend: 10.1<--, 10.2<--, 11.1<--, 9.9<--, 11.1<--  03-11    152<H>  |  121<H>  |  29<H>  ----------------------------<  161<H>  5.0   |  17<L>  |  0.98    Ca    8.2<L>      11 Mar 2024 09:55  Phos  3.6     0311  Mg     1.8     10      Creatinine Trend: 0.98<--, 0.83<--, 0.71<--, 0.76<--, 0.76<--, 0.77<--    Urinalysis Basic - ( 11 Mar 2024 13:42 )    Color: Dark Yellow / Appearance: Turbid / S.021 / pH: x  Gluc: x / Ketone: Negative mg/dL  / Bili: Negative / Urobili: 0.2 mg/dL   Blood: x / Protein: 30 mg/dL / Nitrite: Positive   Leuk Esterase: Small / RBC: 161 /HPF /  /HPF   Sq Epi: x / Non Sq Epi: 3 /HPF / Bacteria: Negative /HPF      Arterial Blood Gas:   @ 12:42  7.38/28/63/17/93.1/-7.1  ABG lactate: --        MICROBIOLOGY:     RADIOLOGY:  [ ] Reviewed and interpreted by me    PULMONARY FUNCTION TESTS:    EKG:

## 2024-03-11 NOTE — BH CONSULTATION LIAISON ASSESSMENT NOTE - OTHER PAST PSYCHIATRIC HISTORY (INCLUDE DETAILS REGARDING ONSET, COURSE OF ILLNESS, INPATIENT/OUTPATIENT TREATMENT)
History of dementia and behavioral disturbance. Sees consultant psychiatrist at [facility], Dr. Wan Belcher.

## 2024-03-11 NOTE — CONSULT NOTE ADULT - TIME BILLING
Reviewing the chart, interpreting lab data, discussing case with team and patient's family, interview and examination of patient, and documentation. Pt is at high risk of mortality due to her disease.

## 2024-03-12 NOTE — CHART NOTE - NSCHARTNOTEFT_GEN_A_CORE
Called to pronounce pt w/ cardiopulmonary arrest secondary to hypoxic respiratory failure, PNA. Pt found in bed. Heart sounds absent. No spontaneous respirations, No palpable pulse. Pupils fixed, dilated. Pronounced on 3/12/2024 at 01:17 AM. Family was called niece  Rebecca Chilel and niece Tiesha Daley, autopsy was declined. No ME case. Attending, Dr. Guerra notified.    Ruthann Duenas NP, #[id number]

## 2024-03-12 NOTE — DISCHARGE NOTE FOR THE EXPIRED PATIENT - HOSPITAL COURSE
This is a 89 y/o F w/ PMHx of malnutrition, HTN, PPM, hypothrodism, mild congnitive impairment who presented initially on 3/8/24 for change in mental status, decreased PO intake, decreased PO intake, chest congestion. Pt developed hypoxia, infiltrates and tachypnea.    Pt was last noted at baseline on 3/2, AAOx3.   In the ER, pt was afebrile, saturating well on RA. WBC 12.6, now inreased to 15.9. U/A w/ pyuria to 64. Flu/RSV/COVID negative,   CXR on 3/8 with clear lungs. Pt started on Ceftriaxone for possible UTI, however UCx not sent.   Now with worsening leukocytosis and c/f PNA. Pt was recently admitted from 2/27 to 2/29 for unresposiveness, found to be flu positive, PPM interogated. Treated w/ Tamiflu.  neuro called for dysarthria, CTH with  chronic changes. #Hypoxic respiratory failure   #Severe sepsis   #UTI  #C/f PNA   #Metabolic encepathopathy   Overall,  89 y/o F w/ PMHx of malnutrition, HTN, PPM, hypothyroidism mild cognitive impairment who presented initially on 3/8/24 for change in mental status, decreased PO intake, decreased PO intake, chest congestion, CTH w/o any changes.   Pt noted to have positive U/A, however UCx not sent, started on empiric CTX. WBC 12 on admission, initially improved, however now increased to 15, CXR with worsening R sided opacity?   Pt with increased respiratory rate and now on 2L O2. Likely  aspiration PNA, would need to cover pseudomonas given recent hospital stay. ceftriaxone was d/c'd ,  Zosyn was started. ID and pulmonary consulted. Pt with worsening tachypnea, suctioned, nebs in use, oxygen all care provided. [facility] d/w family, pt is DNR/DNI.   Called to pronounce pt w/ cardiopulmonary arrest secondary to hypoxic respiratory failure, PNA. Pt found in bed. Heart sounds absent. No spontaneous respirations, No palpable pulse. Pupils fixed, dilated. Pronounced on 3/12/2024 at 01:17 AM. Family was called nimyron Chilel and niece Tiesha Daley, autopsy was declined. No ME case. Attending, Dr. Guerra notified.

## 2024-03-13 LAB
CULTURE RESULTS: SIGNIFICANT CHANGE UP
CULTURE RESULTS: SIGNIFICANT CHANGE UP
SPECIMEN SOURCE: SIGNIFICANT CHANGE UP
SPECIMEN SOURCE: SIGNIFICANT CHANGE UP

## 2024-04-01 NOTE — PATIENT PROFILE ADULT - ARRIVAL FROM
DATE: 4/4/2024  PATIENT: Letty Felix  YOB: 1946  MRN: [id number]  PCP: Armen Yang MD  HEME/ONC PROVIDER: Dr. Diego Hogan M.D.      CHIEF COMPLAINT  Letty Felix is a 77 year old female who is here for the follow up of JAK2+ essential thrombocytosis.        HISTORY OF PRESENT ILLNESS    On 3/4/24 she increased hydroxyurea dose to 1000 mg qAM and 500 mg qPM. She has not yet had a repeat CBC because there have been difficulties getting home blood draw and she has not wanted to go into the labs.    She denies any side effects of the medication. No diarrhea, skin/nail changes, or other symptoms.      VITALS  There were no vitals filed for this visit.    Body Surface Area: There is no height or weight on file to calculate BSA.    PHYSICAL EXAM  Video visit    LABS    WBC (10e3/uL)   Date Value   03/04/2024 8.4     HCT (%)   Date Value   03/04/2024 36.9     HGB (g/dL)   Date Value   03/04/2024 12.0       PLT (10e3/uL)   Date Value   03/04/2024 858 (H)     Glucose (mg/dL)   Date Value   10/18/2023 72     Sodium (mmol/L)   Date Value   10/18/2023 141     Potassium (mmol/L)   Date Value   10/18/2023 3.5     Chloride (mmol/L)   Date Value   10/18/2023 109     Carbon Dioxide (mmol/L)   Date Value   10/18/2023 24     BUN (mg/dL)   Date Value   10/18/2023 9     Creatinine (mg/dL)   Date Value   10/18/2023 0.71     Protein, Total (g/dL)   Date Value   10/18/2023 6.1 (L)     Albumin (g/dL)   Date Value   10/18/2023 1.8 (L)     Calcium (mg/dL)   Date Value   10/18/2023 9.1     Bilirubin, Total (mg/dL)   Date Value   10/18/2023 0.3     GOT/AST (Units/L)   Date Value   10/18/2023 26     Alkaline Phosphatase (Units/L)   Date Value   10/18/2023 115     GPT/ALT (Units/L)   Date Value   10/18/2023 30     Anion Gap (mmol/L)   Date Value   10/18/2023 12     Globulin (g/dL)   Date Value   10/18/2023 4.3 (H)     A/G Ratio (no units)   Date Value   10/18/2023 0.4 (L)        IMAGING  No results  Nursing home found.    PATHOLOGY  N/A    ASSESSMENT/PLAN  77 year old female presenting for follow-up of JAK2+ essential thrombocytosis    #JAK2+ essential thrombocytosis  -admitted to [facility] in 2023 with stroke, equired plateletpheresis and high dose hydroxyurea due to platelet count > 1 million.   -Subsequently developed pancytopenia, hydroxyurea discontinued 10/9/23, bone marrow biopsy 10/13/23 showed no new findings.  -4/4/24: Titrating hydroxyurea cautiously due to prior cytopenia issues. On 3/4/24, PLT count was improved somewhat but still well above goal. Therefore, hydroxyurea was increased to 1000 mg qAM and 500 mg qPM. She needs a f/u CBC now, if PLT still above goal with appropriate WBC and Hgb, will plan to increase hydroxyurea to 1000 mg BID. Will need to monitor CBC monthly for now. Once equilibrium is reached, we can spread surveillance to every 3 months.      Vitals:    04/04/24 1002   PainSc:  0                 ORDERS  Orders Placed This Encounter   • CBC with Automated Differential             FOLLOW UP  Return in about 3 months (around 7/4/2024).      Above plan was discussed with patient and family and all pertinent questions were answered.     Thank you for allowing me to participate in your patient's healthcare management. Please feel free to contact me with any questions.    Sincerely,    Diego Hogan MD

## 2024-04-01 NOTE — DISCHARGE NOTE PROVIDER - HOSPITAL COURSE
[Follow-Up Visit] : a follow-up visit for [Autism Spectrum Disorder] : autism spectrum disorder [Developmental Delay] : developmental delay [Speech/Language] : speech/language problems [Mother] : mother 93 yo RH female with no known PMHx presents to the ED after a fall.  Seen by Podiatry/ID for R foot ulcer:   RF cellulitis now resolved, and wound to subQ  R foot ulcer- not deep  recd 3 days of Ancef- keflex   no s/s systemic dissemination  foot exam not s/o infection  -Afebrile, no leukocytosis  -Right foot erythema resolved, wound to subQ on dorsum of the foot w/ fibrotic wound base, does not probe beyond subQ, no malodor, no drainage  -wound culture not obtained as it would yield contaminant  -Recommend continued LWC w/ mupirocin and band-aid to dorsal R foot wound daily    s/p fall at home  XR lumbar spine AP and lateral negative for acute fracture.    Pt also with Rt foot drop- Pt. was fitted with a size extra small. A Darco shoe was incorporated with the brace ,and 2 socks. Brace ,shoe and sock.  Pt to c/w Haldol for Agitation  pt medically cleared for discharge back to [facility]

## 2024-05-20 NOTE — DIETITIAN INITIAL EVALUATION ADULT - ADD RECOMMEND
07-Jun-2024
1) Continue regular diet free of therapeutic restrictions, defer diet texture to team - currently minced and moist  2) Continue multivitamin and folic acid supplementation  3) Malnutrition sticker placed in chart   4) Monitor nutritional intake, diet tolerance, labs, hydration, GI function, skin integrity and wt trends

## 2024-06-08 NOTE — PHYSICAL THERAPY INITIAL EVALUATION ADULT - PERTINENT HX OF CURRENT PROBLEM, REHAB EVAL
89 y/o F w/ hx of dementia, R foot drop presents from home after a fall. Xray pelvis, L spine and R foot negative for any fx or dislocations. Lab shows dec h/h I have personally performed a history and physical exam on this patient and personally directed the management of the patient.

## 2025-01-24 NOTE — ED ADULT TRIAGE NOTE - INTERNATIONAL TRAVEL
Last refill was from the hospital.     Reason for call:   [x] Refill   [] Prior Auth  [] Other:     Office:   [] PCP/Provider -   [x] Specialty/Provider - Irma Batista    Medication: buPROPion (WELLBUTRIN XL) 150 mg 24 hr tablet    Dose/Frequency: Take 1 tablet (150 mg total) by mouth daily,    Quantity: 30    Pharmacy: [address]     Does the patient have enough for 3 days?   [] Yes   [x] No - Send as HP to POD     No

## 2025-03-12 NOTE — PATIENT PROFILE ADULT - TYPE OF COMMUNICATION USED TO [facility]
Patient: Porsche Cosby Date of Service: 3/12/2025   : 1949 MRN: [id number]     SUBJECTIVE:   HISTORY OF PRESENT ILLNESS:  As you know, Porsche Cosby is a 75 year old female     Hypertension  Metoprolol XL 25 mg once a day  Compliant with medication no side effect    Patient continues to have a daily headache since her TIA 1 month ago  The headache is at the back of her neck coming up towards the top of her head and also behind her eyes  Does have some dizziness with it  No visual changes  No muscle weakness  No speech issues  She does have to take Tylenol 650 twice daily for the headache  This does help the headache      Patient also has a loss of sense of smell, 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 infections  This is chronic and has been evaluated by ENT  CT sinuses done 2025        Eye Problem(s):negative  ENT Problem(s): Loss of smell  Cardiovascular problem(s):negative  Respiratory problem(s):negative  Gastro-intestinal problem(s):negative GI  Genito-urinary problem(s):negative  Musculoskeletal problem(s): Arthritis  Integumentary problem(s):negative  Neurological problem(s): Headache  Psychiatric problem(s):negative  Endocrine problem(s):negative  Hematologic and/or Lymphatic problem(s):negative    PAST MEDICAL HISTORY:  Past Medical History:   Diagnosis Date    COVID-19 virus infection     10/2021    DDD (degenerative disc disease), lumbar 2022    GERD (gastroesophageal reflux disease)     egd done in 2024, negative biopsies    History of transient ischemic attack (TIA) 2025    Hyperlipidemia 2012    Hypothyroidism 2002    Microcalcifications of the breast 2019    JAYSHREE (stress urinary incontinence, female) 2017    Vaginal atrophy 2017    Vitamin D deficiency 2017       MEDICATIONS:  Current Outpatient Medications   Medication Sig    atorvastatin (LIPITOR) 20 MG tablet Take 1 tablet by mouth daily.    aspirin 325 MG tablet Take 1  tablet by mouth daily.    metoPROLOL succinate (TOPROL-XL) 25 MG 24 hr tablet Take 1 tablet by mouth daily.    sertraline (ZOLOFT) 25 MG tablet Take 1 tablet by mouth once daily    levothyroxine 75 MCG tablet Take 1 tablet by mouth once daily    ipratropium (ATROVENT) 0.03 % nasal spray Spray 2 sprays in each nostril every 12 hours.    Cholecalciferol (VITAMIN D3) 2000 units Tab Take 4,000 mg by mouth daily.     calcium citrate/vit D (CITRACAL + D) 315-200 MG-UNIT per tablet Take 1,200 mg by mouth daily.      No current facility-administered medications for this visit.       ALLERGIES:  ALLERGIES:  No Known Allergies    PAST SURGICAL HISTORY:  Past Surgical History:   Procedure Laterality Date    Bladder repair      Colonoscopy      11/2024, repeat in 5 yrs    Femur/knee surg unlisted Left     knee athroscopic    Tonsillectomy      Total abdominal hysterectomy w/ bilateral salpingoophorectomy         FAMILY HISTORY:  Family History   Problem Relation Age of Onset    Hyperlipidemia Mother     Hypertension Mother     Cancer, Endometrial Mother     Hyperlipidemia Father     Hypertension Father     Congestive Heart Failure Father     Hypertension Sister     Heart Maternal Grandmother     Heart Paternal Grandmother     Cancer, Breast Maternal Aunt        SOCIAL HISTORY:  Social History     Tobacco Use    Smoking status: Never    Smokeless tobacco: Never   Vaping Use    Vaping status: never used   Substance Use Topics    Alcohol use: No    Drug use: Never         OBJECTIVE:       PHYSICAL EXAMINATION:  Visit Vitals  /70   Pulse (!) 60   Temp 97.1 °F (36.2 °C)   Ht 5' 4\" (1.626 m)   Wt 87.1 kg (192 lb)   SpO2 97%   BMI 32.96 kg/m²       Examination of the patient reveals:     GENERAL: appears stated age, well developed and well nourished, in no distress, and normal affect  SKIN normal color, normal texture, and normal turgor  HEAD: normocephalic  EYES: pupils are equal and reactive to light and accommodation  extraocular movements are full sclerae and conjunctivae are normal lids and lashes are normal  EARS: pinna and external ear is normal bilaterally  NOSE: external nose is normal to inspection  MOUTH/THROAT: tongue is midline and appears normal, oropharynx appears normal, soft palate and uvula are normal, and oral mucosa is normal  NECK: neck is supple, there is full range of motion, and ABNORMAL FINDINGS>> pain with palpation over bilateral paraspinals in the cervical spine  LUNGS: lungs are clear to auscultation with normal inspiratory/expiratory sounds, no rales, no rhonchi, no wheezes, non labored respirations, symmentrical expansion, and no accessory muscle use  HEART: normal rate and rhythm  NEUROLOGIC: cranial nerves 2 through 10 normal, motor strength normal, gait and station normal, coordination normal, and no tremor noted  EXTREMITIES: normal muscle tone and development bilaterally          Assessment AND PLAN:     1. History of transient ischemic attack (TIA) with persistent headache  Labs ordered  - SERVICE TO NEUROLOGY changed to ASAP to see if patient get an earlier  Continue Tylenol twice daily OTC  If labs are normal, will order MRI of brain no contrast  Call with changing or worsening symptoms      2. Primary hypertension  Blood pressure at goal, continue current medications    3. Nonintractable headache, unspecified chronicity pattern, unspecified headache type  New  Discussed could be related to her neck pain  Labs ordered, if normal will order MRI of brain, no contrast  - SERVICE TO NEUROLOGY  - CBC with Automated Differential; Future  - Comprehensive Metabolic Panel; Future  - Magnesium; Future  - acetaminophen (TYLENOL) 650 MG CR tablet; Take 1 tablet by mouth every 8 hours as needed for Pain.    4. Chronic neck pain  No alarm symptoms  - tiZANidine (ZANAFLEX) 2 MG tablet; Take 1 tablet by mouth nightly as needed for Muscle spasms.  Dispense: 30 tablet; Refill: 0  Continue Tylenol twice daily  OTC  - SERVICE TO PHYSICAL THERAPY    Follow-up in 3 months or sooner if needed    I have personally reviewed labs, imaging, and consult notes related to this visit    Note to patient: The 21st Century Cures Act makes medical notes like these available to patients in the interest of transparency. However, be advised this is a medical document. It is intended as peer to peer communication. It is written in medical language and may contain abbreviations or verbiage that are unfamiliar. It may appear blunt or direct. Medical documents are intended to carry relevant information, facts as evident, and the clinical opinion of the practitioner.      The patient expressed an understanding of the plan of care, and I answered all of her questions. It was a pleasure seeing Porsche Cosby in clinic today.               Other